# Patient Record
Sex: MALE | Race: WHITE | Employment: OTHER
[De-identification: names, ages, dates, MRNs, and addresses within clinical notes are randomized per-mention and may not be internally consistent; named-entity substitution may affect disease eponyms.]

---

## 2020-02-14 ENCOUNTER — TRANSCRIBE ORDERS (OUTPATIENT)
Dept: OTHER | Age: 40
End: 2020-02-14

## 2020-02-14 DIAGNOSIS — M51.16 INTERVERTEBRAL DISC DISORDER WITH RADICULOPATHY OF LUMBAR REGION: Primary | ICD-10-CM

## 2020-02-17 ENCOUNTER — PRE VISIT (OUTPATIENT)
Dept: ORTHOPEDICS | Facility: CLINIC | Age: 40
End: 2020-02-17

## 2020-02-17 NOTE — TELEPHONE ENCOUNTER
INTAKE QUESTIONS FOR SPINE AND NECK                                                                     REASON FOR VISIT: Intervertebral disc disorder with radiculopathy of lumbar region  Patient with back pain and radicular pain-MRI shows ruptured L4 disc w/nerve impingement     APPOINTMENT DATE: 2/25/20   HAVE YOU HAD PREVIOUS SURGERIES ON YOUR NECK OR SPINE: No  WHERE?     WHEN?    HAVE YOU HAD RELATED IMAGING?   (BONE SCANS, XRAYS, CAT SCANS, MRIS) Yes, MRI    WHERE? [facility]    WHEN? Unknown   HAVE YOU HAD INJECTIONS TO THE SPINE? Unknown    WHERE?     WHEN?    HAVE YOU HAD RELATED PHYSICAL THERAPY IN THE LAST YEAR? Uknown    WHERE?    DO YOU HAVE ANY RELATED IMPLANTS OR HARDWARE? No   DO YOU HAVE ANY PATHOLOGY REPORTS RELATED TO YOUR SPINE OR NECK? No     CSS NOTES STATUS DETAILS   OFFICE NOTE from referring provider In process    OFFICE NOTE from other specialist In process    PHYSICAL THERAPY (WITHIN LAST YEAR) In process    DISCHARGE SUMMARY from hospital In process    DISCHARGE REPORT from the ER In process    OPERATIVE REPORT In process    MEDICATION LIST In process    LABS/CBC/DIFF In process    CULTURES In process    IMPLANT RECORD/STICKER In process    IMAGING     INJECTIONS DONE IN RADIOLOGY In process    MRI In process    CT SCAN In process    XRAYS (IMAGES & REPORTS) In process    TUMOR     PATHOLOGY  Slides & report In process

## 2020-02-24 DIAGNOSIS — M54.50 LUMBAR PAIN: Primary | ICD-10-CM

## 2020-02-25 ENCOUNTER — TRANSFERRED RECORDS (OUTPATIENT)
Dept: HEALTH INFORMATION MANAGEMENT | Facility: CLINIC | Age: 40
End: 2020-02-25

## 2020-02-25 ENCOUNTER — ANCILLARY PROCEDURE (OUTPATIENT)
Dept: GENERAL RADIOLOGY | Facility: CLINIC | Age: 40
End: 2020-02-25
Attending: ORTHOPAEDIC SURGERY
Payer: COMMERCIAL

## 2020-02-25 ENCOUNTER — OFFICE VISIT (OUTPATIENT)
Dept: ORTHOPEDICS | Facility: CLINIC | Age: 40
End: 2020-02-25
Payer: COMMERCIAL

## 2020-02-25 ENCOUNTER — TELEPHONE (OUTPATIENT)
Dept: ORTHOPEDICS | Facility: CLINIC | Age: 40
End: 2020-02-25

## 2020-02-25 VITALS — HEIGHT: 68 IN | BODY MASS INDEX: 24.25 KG/M2 | WEIGHT: 160 LBS

## 2020-02-25 DIAGNOSIS — M51.16 LUMBAR DISC HERNIATION WITH RADICULOPATHY: ICD-10-CM

## 2020-02-25 DIAGNOSIS — M54.50 LUMBAR PAIN: Primary | ICD-10-CM

## 2020-02-25 RX ORDER — HYDROCODONE BITARTRATE AND ACETAMINOPHEN 5; 325 MG/1; MG/1
1 TABLET ORAL 3 TIMES DAILY
Status: ON HOLD | COMMUNITY
End: 2020-03-09

## 2020-02-25 RX ORDER — PREGABALIN 100 MG/1
100 CAPSULE ORAL 3 TIMES DAILY
COMMUNITY
End: 2020-06-23

## 2020-02-25 ASSESSMENT — ENCOUNTER SYMPTOMS
MUSCLE CRAMPS: 1
MYALGIAS: 1
DEPRESSION: 0
BACK PAIN: 1
JOINT SWELLING: 1
STIFFNESS: 1
DECREASED CONCENTRATION: 0
PANIC: 1
MUSCLE WEAKNESS: 1
ARTHRALGIAS: 1
NERVOUS/ANXIOUS: 1
INSOMNIA: 1
NECK PAIN: 0

## 2020-02-25 ASSESSMENT — MIFFLIN-ST. JEOR: SCORE: 1605.26

## 2020-02-25 NOTE — NURSING NOTE
"Reason For Visit:   Chief Complaint   Patient presents with     Consult     intervertebral disc disorder and lumbar radiculopathy        Primary MD: No Ref-Primary, Physician  Ref. MD: cortes   Date of surgery: none   Type of surgery: none .  Smoker: Yes  Request smoking cessation information: nO    Ht 1.727 m (5' 8\")   Wt 72.6 kg (160 lb)   BMI 24.33 kg/m           Oswestry (ANJANA) Questionnaire    OSWESTRY DISABILITY INDEX 2/25/2020   Count 10   Sum 30   Oswestry Score (%) 60            Neck Disability Index (NDI) Questionnaire    No flowsheet data found.                Promis 10 Assessment    No flowsheet data found.             Arron Chopra, ATC  "

## 2020-02-25 NOTE — LETTER
2/25/2020       RE: Ortega Burroughs  [address] [place] [address]     Dear Colleague,    Thank you for referring your patient, Ortega Burroughs, to the [facility] at [facility]. Please see a copy of my visit note below.    Spine Surgery Consultation    REFERRING PHYSICIAN: Donis oLrd MD  PRIMARY CARE PHYSICIAN: No Ref-Primary, Physician           Chief Complaint:   Back pain with right leg radiculopathy.  History of Present Illness:  Symptom Profile Including: location of symptoms, onset, severity, exacerbating/alleviating factors, previous treatments:        Ortega Burroughs is a 39 year old male who presents today with a long history of low back pain for many years.  This is been worsening with right-sided radiculopathy for the last several months.  [facility]  Back pain had led to treatment over the years and he stated he had originally [facility] [place] [facility] he moved here to [place].  He notes that spine surgeries are not done at the [facility] here in [place], and is in for evaluation of significant low back pain with radiation to right-sided gluteals.    Spasm in these muscles has gone on for 3 weeks at this time.  He also has radiating pain to posterior right thigh.  Not to his knee or below.  He has somewhat of an ache in the gluteals on the left side though much less severe.  He states that standing is the worst and that he has no position of relief.  He has been ambulating with a cane to take weight off the right side.  [facility]  After evaluation he was given hydrocodone and started on a prednisone treatment at 60 mg/day.  This was refilled on Friday.  He states that it has not been helpful.  He is not taking muscle relaxer.  He has not had physical therapy but does his own home exercise program.  He has not had any injections for this problem.  He  "has utilized a cane for quite a while for severe left knee pain.  He states that hernia surgery resulted in a radiculopathy around his right-sided inguinal region and has been treated with pregabalin for several years at 100 mg/day dosage.         Past Medical History:   No past medical history on file.         Past Surgical History:   No past surgical history on file.         Social History:     Social History     Tobacco Use     Smoking status: Current Every Day Smoker     Packs/day: 0.50     Years: 25.00     Pack years: 12.50     Smokeless tobacco: Current User     Types: Chew   Substance Use Topics     Alcohol use: Not on file            Family History:   No family history on file.         Allergies:   No Known Allergies         Medications:     Current Outpatient Medications   Medication     HYDROcodone-acetaminophen (NORCO) 5-325 MG tablet     pregabalin (LYRICA) 100 MG capsule     No current facility-administered medications for this visit.              Review of Systems:     A 10 point ROS was performed and reviewed. Specific responses to these questions are noted at the end of the document.         Physical Exam:   Vitals: Ht 1.727 m (5' 8\")   Wt 72.6 kg (160 lb)   BMI 24.33 kg/m     Constitutional: awake, alert, cooperative, no apparent distress, appears stated age.    Eyes: The sclera are white.  Ears, Nose, Throat: The trachea is midline.  Psychiatric: The patient has a normal affect.  Respiratory: breathing non-labored  Cardiovascular: The extremities are warm and perfused.  Skin: no obvious rashes or lesions.  Musculoskeletal, Neurologic, and Spine:    Lumbar Spine:    Appearance - No gross stepoffs or deformities    Motor -     L2-3: Hip flexion 5/5 R and 5/5 L strength          L3/4:  Knee extension R 5/5 and L 5/5 strength         L4/5:  Foot dorsiflexion R 5/5 L 4/5 and       EHL dorsiflexion R 5/5 L 4/5 strength         S1:  Plantarflexion/Peroneal Muscles  R 5/5 and L 5/5 strength    Sensation: " intact to light touch L3-S1 distribution BLE  ROM: Patient can forward flex to bring fingertips to knees.  He can extend about 20 degrees.  He can lean 10 degrees bilaterally.  But has significant pain leaning to the left in his low back.  He can rotate about 30 degrees bilaterally.  Facet loading: Negative  Quadratus lumborum tenderness: Negative  Tenderness: To palpation at right-sided lumbosacral junction.  Tenderness at right deep gluteals.  Nontender at SI joints or trochanters bilaterally.    Neurologic:      REFLEXES Left Right                  Patella 2+ 2+   Ankle jerk trace mute        Clonus 0 beats 0 beats     Hip Exam:   Gluteal pain at extremes of hip log roll, no inguinal pain. No tenderness over the greater trochanters.    Alignment:  Patient stands with a neutral standing sagittal balance.         Imaging:   We ordered and independently reviewed new AP and lateral radiographs, with flexion and extension views at this clinic visit. The results were discussed with the patient.  Findings include:  5 lumbar vertebral bodies with evidence of anterolisthesis of L3 on L4.  Mild multilevel degenerative changes with traction spurring noted.  Notable sclerosis of facet joints and lower lumbar region.  No evidence of motion on flexion-extension views  MRI dated 2/9/2020 demonstrates right paracentral disc herniation impinging right L5 nerve.  Facet arthropathy at L4-5 and L5-S1.           Assessment and Plan:   Assessment:  39 year old male with low back pain and right-sided radiculopathy.  Physical exam is not completely concordant with imaging findings with gluteal and upper thigh pain without notable radiculopathy below the knee.  We would like to clarify these issues by scheduling the patient for a right-sided transforaminal epidural steroid injection at L5-S1.  This is for diagnostic purposes and we had a long discussion with the patient regarding keeping track of his pain with a pain diary.  We would  like to schedule the injection for this week.  We discussed the benefits of physical therapy with the patient.  He states he has had significant physical therapy in the past and does not feel that it is been helpful.  He is not interested in pursuing this treatment option at this time.     Plan:  1. Right-sided transforaminal epidural steroid injection at L5-S1.  2. Follow-up in clinic next week for evaluation.    Staff statement:  The patient is predominantly reporting right buttock and thigh pain.  He does have a right paracentral disc herniation at L5-S1.  His pain does not really extend below the knee.  He does have a mildly positive straight leg raise on my exam.  He does have some weakness with single leg heel rise on the right side.  I told him that potentially imaging is responsible for his symptoms but to help us better differentiate I would like to order an L5-S1 transforaminal epidural injection.  I would like him to keep a pain diary.  If he has significant relief with this he may be a candidate for a microdiscectomy.  I gave him some information to read about the surgery and we can discuss more at the next visit whether or not this would be something he is interested in.    Thank you for allowing Dr Waldrop and myself participate in the care of this patient.  Respectfully,  Jeff Murray PA-C    Respectfully,  Dmitri Waldrop MD  Spine Surgery  [facility]      Attending MD (Dr. Dmitri Waldrop) :  I reviewed and verified the history and physical exam of the patient and discussed the patient's management with the other clinical providers involved in this patient's care including any involved residents or physicians assistants. I reviewed the above note and agree with the documented findings and plan of care, which were communicated to the patient.      Dmitri Waldrop MD      Answers for HPI/ROS submitted by the patient on 2/25/2020   General Symptoms: No  Skin Symptoms:  No  HENT Symptoms: No  EYE SYMPTOMS: No  HEART SYMPTOMS: No  LUNG SYMPTOMS: No  INTESTINAL SYMPTOMS: No  URINARY SYMPTOMS: No  REPRODUCTIVE SYMPTOMS: No  SKELETAL SYMPTOMS: Yes  BLOOD SYMPTOMS: No  NERVOUS SYSTEM SYMPTOMS: No  MENTAL HEALTH SYMPTOMS: Yes  Back pain: Yes  Muscle aches: Yes  Neck pain: No  Swollen joints: Yes  Joint pain: Yes  Bone pain: No  Muscle cramps: Yes  Muscle weakness: Yes  Joint stiffness: Yes  Bone fracture: No  Nervous or Anxious: Yes  Depression: No  Trouble sleeping: Yes  Trouble thinking or concentrating: No  Mood changes: Yes  Panic attacks: Yes

## 2020-02-25 NOTE — TELEPHONE ENCOUNTER
Returned call to [employer] and let them know that they will need to have the [facility] to the [facility] and [facility] process/send us the paperwork to complete.

## 2020-02-25 NOTE — PROGRESS NOTES
Spine Surgery Consultation    REFERRING PHYSICIAN: Donis Lord MD  PRIMARY CARE PHYSICIAN: No Ref-Primary, Physician           Chief Complaint:   Back pain with right leg radiculopathy.  History of Present Illness:  Symptom Profile Including: location of symptoms, onset, severity, exacerbating/alleviating factors, previous treatments:        Ortega Burroughs is a 39 year old male who presents today with a long history of low back pain for many years.  This is been worsening with right-sided radiculopathy for the last several months.  [facility]  Back pain had led to treatment over the years and he stated he had originally had [facility] [place] before he moved here to [place].  He notes that spine surgeries are not done at the [facility] here in [place], and is in for evaluation of significant low back pain with radiation to right-sided gluteals.    Spasm in these muscles has gone on for 3 weeks at this time.  He also has radiating pain to posterior right thigh.  Not to his knee or below.  He has somewhat of an ache in the gluteals on the left side though much less severe.  He states that standing is the worst and that he has no position of relief.  He has been ambulating with a cane to take weight off the right side.  [facility]  After evaluation he was given hydrocodone and started on a prednisone treatment at 60 mg/day.  This was refilled on Friday.  He states that it has not been helpful.  He is not taking muscle relaxer.  He has not had physical therapy but does his own home exercise program.  He has not had any injections for this problem.  He has utilized a cane for quite a while for severe left knee pain.  He states that hernia surgery resulted in a radiculopathy around his right-sided inguinal region and has been treated with pregabalin for several years at 100 mg/day dosage.         Past Medical History:   No past  "medical history on file.         Past Surgical History:   No past surgical history on file.         Social History:     Social History     Tobacco Use     Smoking status: Current Every Day Smoker     Packs/day: 0.50     Years: 25.00     Pack years: 12.50     Smokeless tobacco: Current User     Types: Chew   Substance Use Topics     Alcohol use: Not on file            Family History:   No family history on file.         Allergies:   No Known Allergies         Medications:     Current Outpatient Medications   Medication     HYDROcodone-acetaminophen (NORCO) 5-325 MG tablet     pregabalin (LYRICA) 100 MG capsule     No current facility-administered medications for this visit.              Review of Systems:     A 10 point ROS was performed and reviewed. Specific responses to these questions are noted at the end of the document.         Physical Exam:   Vitals: Ht 1.727 m (5' 8\")   Wt 72.6 kg (160 lb)   BMI 24.33 kg/m    Constitutional: awake, alert, cooperative, no apparent distress, appears stated age.    Eyes: The sclera are white.  Ears, Nose, Throat: The trachea is midline.  Psychiatric: The patient has a normal affect.  Respiratory: breathing non-labored  Cardiovascular: The extremities are warm and perfused.  Skin: no obvious rashes or lesions.  Musculoskeletal, Neurologic, and Spine:    Lumbar Spine:    Appearance - No gross stepoffs or deformities    Motor -     L2-3: Hip flexion 5/5 R and 5/5 L strength          L3/4:  Knee extension R 5/5 and L 5/5 strength         L4/5:  Foot dorsiflexion R 5/5 L 4/5 and       EHL dorsiflexion R 5/5 L 4/5 strength         S1:  Plantarflexion/Peroneal Muscles  R 5/5 and L 5/5 strength    Sensation: intact to light touch L3-S1 distribution BLE  ROM: Patient can forward flex to bring fingertips to knees.  He can extend about 20 degrees.  He can lean 10 degrees bilaterally.  But has significant pain leaning to the left in his low back.  He can rotate about 30 degrees " bilaterally.  Facet loading: Negative  Quadratus lumborum tenderness: Negative  Tenderness: To palpation at right-sided lumbosacral junction.  Tenderness at right deep gluteals.  Nontender at SI joints or trochanters bilaterally.    Neurologic:      REFLEXES Left Right                  Patella 2+ 2+   Ankle jerk trace mute        Clonus 0 beats 0 beats     Hip Exam:   Gluteal pain at extremes of hip log roll, no inguinal pain. No tenderness over the greater trochanters.    Alignment:  Patient stands with a neutral standing sagittal balance.         Imaging:   We ordered and independently reviewed new AP and lateral radiographs, with flexion and extension views at this clinic visit. The results were discussed with the patient.  Findings include:  5 lumbar vertebral bodies with evidence of anterolisthesis of L3 on L4.  Mild multilevel degenerative changes with traction spurring noted.  Notable sclerosis of facet joints and lower lumbar region.  No evidence of motion on flexion-extension views  MRI dated 2/9/2020 demonstrates right paracentral disc herniation impinging right L5 nerve.  Facet arthropathy at L4-5 and L5-S1.           Assessment and Plan:   Assessment:  39 year old male with low back pain and right-sided radiculopathy.  Physical exam is not completely concordant with imaging findings with gluteal and upper thigh pain without notable radiculopathy below the knee.  We would like to clarify these issues by scheduling the patient for a right-sided transforaminal epidural steroid injection at L5-S1.  This is for diagnostic purposes and we had a long discussion with the patient regarding keeping track of his pain with a pain diary.  We would like to schedule the injection for this week.  We discussed the benefits of physical therapy with the patient.  He states he has had significant physical therapy in the past and does not feel that it is been helpful.  He is not interested in pursuing this treatment option  at this time.     Plan:  1. Right-sided transforaminal epidural steroid injection at L5-S1.  2. Follow-up in clinic next week for evaluation.    Staff statement:  The patient is predominantly reporting right buttock and thigh pain.  He does have a right paracentral disc herniation at L5-S1.  His pain does not really extend below the knee.  He does have a mildly positive straight leg raise on my exam.  He does have some weakness with single leg heel rise on the right side.  I told him that potentially imaging is responsible for his symptoms but to help us better differentiate I would like to order an L5-S1 transforaminal epidural injection.  I would like him to keep a pain diary.  If he has significant relief with this he may be a candidate for a microdiscectomy.  I gave him some information to read about the surgery and we can discuss more at the next visit whether or not this would be something he is interested in.    Thank you for allowing Dr Waldrop and myself participate in the care of this patient.  Respectfully,  Jeff Murray PA-C    Respectfully,  Dmitri Waldrop MD  Spine Surgery  [facility]      Attending MD (Dr. Dmitri Waldrop) :  I reviewed and verified the history and physical exam of the patient and discussed the patient's management with the other clinical providers involved in this patient's care including any involved residents or physicians assistants. I reviewed the above note and agree with the documented findings and plan of care, which were communicated to the patient.      Dmitri Waldrop MD      Answers for HPI/ROS submitted by the patient on 2/25/2020   General Symptoms: No  Skin Symptoms: No  HENT Symptoms: No  EYE SYMPTOMS: No  HEART SYMPTOMS: No  LUNG SYMPTOMS: No  INTESTINAL SYMPTOMS: No  URINARY SYMPTOMS: No  REPRODUCTIVE SYMPTOMS: No  SKELETAL SYMPTOMS: Yes  BLOOD SYMPTOMS: No  NERVOUS SYSTEM SYMPTOMS: No  MENTAL HEALTH SYMPTOMS: Yes  Back pain:  Yes  Muscle aches: Yes  Neck pain: No  Swollen joints: Yes  Joint pain: Yes  Bone pain: No  Muscle cramps: Yes  Muscle weakness: Yes  Joint stiffness: Yes  Bone fracture: No  Nervous or Anxious: Yes  Depression: No  Trouble sleeping: Yes  Trouble thinking or concentrating: No  Mood changes: Yes  Panic attacks: Yes

## 2020-03-03 ENCOUNTER — OFFICE VISIT (OUTPATIENT)
Dept: ORTHOPEDICS | Facility: CLINIC | Age: 40
End: 2020-03-03
Payer: COMMERCIAL

## 2020-03-03 DIAGNOSIS — M54.16 LUMBAR RADICULOPATHY: ICD-10-CM

## 2020-03-03 DIAGNOSIS — M54.50 LUMBAR PAIN: Primary | ICD-10-CM

## 2020-03-03 ASSESSMENT — ENCOUNTER SYMPTOMS
STIFFNESS: 1
DEPRESSION: 0
ARTHRALGIAS: 1
INSOMNIA: 1
NECK PAIN: 0
MYALGIAS: 1
MUSCLE CRAMPS: 1
JOINT SWELLING: 1
MUSCLE WEAKNESS: 1
NERVOUS/ANXIOUS: 1
BACK PAIN: 1
PANIC: 1
DECREASED CONCENTRATION: 0

## 2020-03-03 NOTE — NURSING NOTE
Reason For Visit:   Chief Complaint   Patient presents with     RECHECK     injection cancelled due to insurance. going to check down strairs        There were no vitals taken for this visit.         Arron Chopra, ATC

## 2020-03-03 NOTE — NURSING NOTE
Teaching Flowsheet   Relevant Diagnosis: L5 S1 Microdiscectomy   Teaching Topic: Pre op teaching     Person(s) involved in teaching:   Patient     Motivation Level:  Asks Questions: Yes  Eager to Learn: Yes  Cooperative: Yes  Receptive (willing/able to accept information): Yes  Any cultural factors/[place] beliefs that may influence understanding or compliance? No       Patient demonstrates understanding of the following:  Reason for the appointment, diagnosis and treatment plan: Yes  Knowledge of proper use of medications and conditions for which they are ordered (with special attention to potential side effects or drug interactions): Yes  Which situations necessitate calling provider and whom to contact: Yes       Teaching Concerns Addressed: RN discussed all aspects of pre op surgery including location, pre op shower, NPO status and post surgery pain mgmt. Carol scheduled PAC and surgery dates. Patient verbalized understanding. No further questions.       Proper use and care of med (medical equip, care aids, etc.): Yes  Nutritional needs and diet plan: Yes  Pain management techniques: Yes  Wound Care: Yes  How and/when to access community resources: Yes     Instructional Materials Used/Given: Pre op packet and antibacterial soap.     Time spent with patient: 15 minutes.

## 2020-03-03 NOTE — LETTER
3/3/2020       RE: Ortega Burroughs  [address] [place] [address]     Dear Colleague,    Thank you for referring your patient, Ortega Burroughs, to the [facility] ORTHOP[facility] at [facility]. Please see a copy of my visit note below.    Spine Surgery Return Clinic Visit      Chief Complaint:   RECHECK (injection cancelled due to insurance. going to check down strairs )      Interval HPI:  Symptom Profile Including: location of symptoms, onset, severity, exacerbating/alleviating factors, previous treatments:        Ortega Burroughs is a 39 year old male who returns again today in follow-up of right L5 and S1 distribution radiculopathy with a known L5-S1 disc herniation.  His symptoms predominantly consist of buttock and thigh pain.  Occasionally he will feel some tingling down in the calf and leg but is really the buttock and thigh.  He has been monitored through the [facility] system for quite some time and has been taking Lyrica and hydrocodone for symptomatic relief.  He is done therapy with them intermittently in the past.    I last saw him a couple of weeks ago and at that time I recommended he try an L5-S1 transforaminal epidural injection.  This was denied by the [facility] system.  He returns to me again today wondering if there is anything else we can do for this pain and he feels quite disabled by it.  He has a significant difficulty straightening the leg and severe difficulty walking.  He is walking with a cane because of the severity of the symptoms.            Past Medical History:   No past medical history on file.         Past Surgical History:   No past surgical history on file.         Social History:     Social History     Tobacco Use     Smoking status: Current Every Day Smoker     Packs/day: 0.50     Years: 25.00     Pack years: 12.50     Smokeless tobacco: Current User     Types: Chew   Substance Use Topics     Alcohol  use: Not on file            Family History:   No family history on file.         Allergies:   No Known Allergies         Medications:     Current Outpatient Medications   Medication     HYDROcodone-acetaminophen (NORCO) 5-325 MG tablet     pregabalin (LYRICA) 100 MG capsule     No current facility-administered medications for this visit.              Review of Systems:   A focused musculoskeletal and neurologic ROS was performed with pertinent positives and negatives noted in the HPI.  Additional systems were also reviewed and are documented at the bottom of the note.         Physical Exam:   Vitals: There were no vitals taken for this visit.  Musculoskeletal, Neurologic, and Spine:         Lumbar Spine:    Appearance - No gross stepoffs or deformities    Motor -     L2-3: Hip flexion 5/5 R and 5/5 L strength          L3/4:  Knee extension R 5/5 and L 5/5 strength         L4/5:  Foot dorsiflexion R 5/5 L 5/5 and       EHL dorsiflexion R 4/5 L 4/5 strength         S1:  Plantarflexion/Peroneal Muscles  R 5/5 and L 5/5 strength    Sensation: intact to light touch L3-S1 distribution BLE, diminished right buttock and thigh,    Positive right strait leg raise    Hip Exam:  No pain with hip log roll and no tenderness over the greater trochanters.    Alignment:  Patient stands with a neutral standing sagittal balance.         Imaging:   We ordered and independently reviewed new radiographs at this clinic visit. The results were discussed with the patient. Findings include:     I again reviewed his previous lumbar MRI and x-rays.  Right paracentral disc herniation L5-S1 with severe impingement of the traversing S1 nerve root     Assessment and Plan:     39 year old male with right L5-S1 paracentral disc herniation and S1 distribution radiculopathy.    He has been taking gabapentin and trying therapy.  At our last visit we discussed the option of an injection.  However this was denied by his insurance.  Given this I do think  it is reasonable for him to consider a microdiscectomy.    Risks of this surgery include risk of infection, risk of dural tear resulting in CSF leak which might result in headaches, or possible need for lumbar drain, or possible revision surgery in the setting of a persistent leak. Risk of seroma or hematoma requiring revision surgery. Possible nerve root injury resulting in numbness weakness or paralysis into the leg. Possible radiculitis which could result in similar symptoms or could result in significant neurogenic type pain into the leg. Risk of incomplete decompression which might require revision surgery in the future.  Risk of pars fracture or postoperative instability requiring conversion to a fusion in the future. Risk of adjacent segment problems requiring surgery in the future. Risk of incomplete relief of symptoms possibly requiring revision surgery in the future. There is a risk of blood clots in the legs or the lungs.  Furthermore, although rare, there are risks of major vessel or major organ injury from the surgery, and risks of the anesthetic including stroke heart attack and death.    I explained the 6-week recovery, the need for activity avoidance and the risk of recurrent disc herniation if he does too much too soon.  He understands and he would like to proceed.  He feels severely disabled and I do think at this point that he is maximized the nonoperative care options available to him.     Respectfully,  Dmitri Waldrop MD  Spine Surgery  [facility]

## 2020-03-03 NOTE — PROGRESS NOTES
Spine Surgery Return Clinic Visit      Chief Complaint:   RECHECK (injection cancelled due to insurance. going to check down strairs )      Interval HPI:  Symptom Profile Including: location of symptoms, onset, severity, exacerbating/alleviating factors, previous treatments:        Ortega Burroughs is a 39 year old male who returns again today in follow-up of right L5 and S1 distribution radiculopathy with a known L5-S1 disc herniation.  His symptoms predominantly consist of buttock and thigh pain.  Occasionally he will feel some tingling down in the calf and leg but is really the buttock and thigh.  He has been monitored through the [facility] system for quite some time and has been taking Lyrica and hydrocodone for symptomatic relief.  He is done therapy with them intermittently in the past.    I last saw him a couple of weeks ago and at that time I recommended he try an L5-S1 transforaminal epidural injection.  This was denied by the [facility] system.  He returns to me again today wondering if there is anything else we can do for this pain and he feels quite disabled by it.  He has a significant difficulty straightening the leg and severe difficulty walking.  He is walking with a cane because of the severity of the symptoms.            Past Medical History:   No past medical history on file.         Past Surgical History:   No past surgical history on file.         Social History:     Social History     Tobacco Use     Smoking status: Current Every Day Smoker     Packs/day: 0.50     Years: 25.00     Pack years: 12.50     Smokeless tobacco: Current User     Types: Chew   Substance Use Topics     Alcohol use: Not on file            Family History:   No family history on file.         Allergies:   No Known Allergies         Medications:     Current Outpatient Medications   Medication     HYDROcodone-acetaminophen (NORCO) 5-325 MG tablet     pregabalin (LYRICA) 100 MG capsule     No  current facility-administered medications for this visit.              Review of Systems:   A focused musculoskeletal and neurologic ROS was performed with pertinent positives and negatives noted in the HPI.  Additional systems were also reviewed and are documented at the bottom of the note.         Physical Exam:   Vitals: There were no vitals taken for this visit.  Musculoskeletal, Neurologic, and Spine:         Lumbar Spine:    Appearance - No gross stepoffs or deformities    Motor -     L2-3: Hip flexion 5/5 R and 5/5 L strength          L3/4:  Knee extension R 5/5 and L 5/5 strength         L4/5:  Foot dorsiflexion R 5/5 L 5/5 and       EHL dorsiflexion R 4/5 L 4/5 strength         S1:  Plantarflexion/Peroneal Muscles  R 5/5 and L 5/5 strength    Sensation: intact to light touch L3-S1 distribution BLE, diminished right buttock and thigh,    Positive right strait leg raise    Hip Exam:  No pain with hip log roll and no tenderness over the greater trochanters.    Alignment:  Patient stands with a neutral standing sagittal balance.           Imaging:   We ordered and independently reviewed new radiographs at this clinic visit. The results were discussed with the patient. Findings include:     I again reviewed his previous lumbar MRI and x-rays.  Right paracentral disc herniation L5-S1 with severe impingement of the traversing S1 nerve root       Assessment and Plan:     39 year old male with right L5-S1 paracentral disc herniation and S1 distribution radiculopathy.    He has been taking gabapentin and trying therapy.  At our last visit we discussed the option of an injection.  However this was denied by his insurance.  Given this I do think it is reasonable for him to consider a microdiscectomy.    Risks of this surgery include risk of infection, risk of dural tear resulting in CSF leak which might result in headaches, or possible need for lumbar drain, or possible revision surgery in the setting of a persistent  leak. Risk of seroma or hematoma requiring revision surgery. Possible nerve root injury resulting in numbness weakness or paralysis into the leg. Possible radiculitis which could result in similar symptoms or could result in significant neurogenic type pain into the leg. Risk of incomplete decompression which might require revision surgery in the future.  Risk of pars fracture or postoperative instability requiring conversion to a fusion in the future. Risk of adjacent segment problems requiring surgery in the future. Risk of incomplete relief of symptoms possibly requiring revision surgery in the future. There is a risk of blood clots in the legs or the lungs.  Furthermore, although rare, there are risks of major vessel or major organ injury from the surgery, and risks of the anesthetic including stroke heart attack and death.    I explained the 6-week recovery, the need for activity avoidance and the risk of recurrent disc herniation if he does too much too soon.  He understands and he would like to proceed.  He feels severely disabled and I do think at this point that he is maximized the nonoperative care options available to him.           Respectfully,  Dmitri Waldrop MD  Spine Surgery  [facility]    Answers for HPI/ROS submitted by the patient on 3/3/2020   General Symptoms: No  Skin Symptoms: No  HENT Symptoms: No  EYE SYMPTOMS: No  HEART SYMPTOMS: No  LUNG SYMPTOMS: No  INTESTINAL SYMPTOMS: No  URINARY SYMPTOMS: No  REPRODUCTIVE SYMPTOMS: No  SKELETAL SYMPTOMS: Yes  BLOOD SYMPTOMS: No  NERVOUS SYSTEM SYMPTOMS: No  MENTAL HEALTH SYMPTOMS: Yes  Back pain: Yes  Muscle aches: Yes  Neck pain: No  Swollen joints: Yes  Joint pain: Yes  Bone pain: No  Muscle cramps: Yes  Muscle weakness: Yes  Joint stiffness: Yes  Bone fracture: No  Nervous or Anxious: Yes  Depression: No  Trouble sleeping: Yes  Trouble thinking or concentrating: No  Mood changes: No  Panic attacks: Yes

## 2020-03-03 NOTE — TELEPHONE ENCOUNTER
FUTURE VISIT INFORMATION      SURGERY INFORMATION:    Date: 3/9/20    Location: [facility] OR    Surgeon:  Dmitri Waldrop MD    Anesthesia Type: general    Procedure: Right Lumbar 5 to Sacral 1 microdiscectomy    Consult: ov 3/2 Benjie    RECORDS REQUESTED FROM:       [facility]- [facility] recs[facility]tests

## 2020-03-06 ENCOUNTER — ANESTHESIA EVENT (OUTPATIENT)
Dept: SURGERY | Facility: CLINIC | Age: 40
End: 2020-03-06

## 2020-03-06 ENCOUNTER — ANESTHESIA EVENT (OUTPATIENT)
Dept: SURGERY | Facility: CLINIC | Age: 40
End: 2020-03-06
Payer: COMMERCIAL

## 2020-03-06 ENCOUNTER — PRE VISIT (OUTPATIENT)
Dept: SURGERY | Facility: CLINIC | Age: 40
End: 2020-03-06

## 2020-03-06 ENCOUNTER — OFFICE VISIT (OUTPATIENT)
Dept: SURGERY | Facility: CLINIC | Age: 40
End: 2020-03-06
Payer: COMMERCIAL

## 2020-03-06 VITALS
SYSTOLIC BLOOD PRESSURE: 115 MMHG | HEART RATE: 108 BPM | HEIGHT: 68 IN | RESPIRATION RATE: 12 BRPM | OXYGEN SATURATION: 95 % | WEIGHT: 159 LBS | TEMPERATURE: 98.1 F | DIASTOLIC BLOOD PRESSURE: 66 MMHG | BODY MASS INDEX: 24.1 KG/M2

## 2020-03-06 DIAGNOSIS — Z01.818 PREOP EXAMINATION: Primary | ICD-10-CM

## 2020-03-06 DIAGNOSIS — Z01.818 PREOP EXAMINATION: ICD-10-CM

## 2020-03-06 LAB
ANION GAP SERPL CALCULATED.3IONS-SCNC: 10 MMOL/L (ref 3–14)
BUN SERPL-MCNC: 21 MG/DL (ref 7–30)
CALCIUM SERPL-MCNC: 9.8 MG/DL (ref 8.5–10.1)
CHLORIDE SERPL-SCNC: 110 MMOL/L (ref 94–109)
CO2 SERPL-SCNC: 21 MMOL/L (ref 20–32)
CREAT SERPL-MCNC: 1.26 MG/DL (ref 0.66–1.25)
ERYTHROCYTE [DISTWIDTH] IN BLOOD BY AUTOMATED COUNT: 12.4 % (ref 10–15)
GFR SERPL CREATININE-BSD FRML MDRD: 70 ML/MIN/{1.73_M2}
GLUCOSE SERPL-MCNC: 88 MG/DL (ref 70–99)
HCT VFR BLD AUTO: 48.7 % (ref 40–53)
HGB BLD-MCNC: 16.2 G/DL (ref 13.3–17.7)
MCH RBC QN AUTO: 30.4 PG (ref 26.5–33)
MCHC RBC AUTO-ENTMCNC: 33.3 G/DL (ref 31.5–36.5)
MCV RBC AUTO: 91 FL (ref 78–100)
PLATELET # BLD AUTO: 224 10E9/L (ref 150–450)
POTASSIUM SERPL-SCNC: 4.6 MMOL/L (ref 3.4–5.3)
RBC # BLD AUTO: 5.33 10E12/L (ref 4.4–5.9)
SODIUM SERPL-SCNC: 141 MMOL/L (ref 133–144)
WBC # BLD AUTO: 13.5 10E9/L (ref 4–11)

## 2020-03-06 RX ORDER — QUETIAPINE FUMARATE 50 MG/1
50 TABLET, FILM COATED ORAL AT BEDTIME
COMMUNITY
End: 2020-06-23

## 2020-03-06 RX ORDER — LIDOCAINE 4 G/G
1 PATCH TOPICAL DAILY
Status: ON HOLD | COMMUNITY
End: 2020-06-30

## 2020-03-06 SDOH — HEALTH STABILITY: MENTAL HEALTH: HOW OFTEN DO YOU HAVE A DRINK CONTAINING ALCOHOL?: MONTHLY OR LESS

## 2020-03-06 ASSESSMENT — MIFFLIN-ST. JEOR: SCORE: 1600.72

## 2020-03-06 ASSESSMENT — PAIN SCALES - GENERAL: PAINLEVEL: EXTREME PAIN (9)

## 2020-03-06 ASSESSMENT — LIFESTYLE VARIABLES: TOBACCO_USE: 1

## 2020-03-06 NOTE — PATIENT INSTRUCTIONS
Preparing for Your Surgery      Name:  Ortega Burroughs   MRN:  [id number]   :  1980   Today's Date:  3/6/2020     Arriving for surgery:  Surgery date:  3/9/20  Arrival time:  09:30 am  Please come to:     [facility] [address]  [address] [place]  [address]    - parking is available in front of [facility] from 5:15AM to 8:00PM. If you prefer, park your car in the Green Lot.    -Proceed to the 3rd floor, check in at the Adult Surgery Waiting Lounge. [phone]    If an escort is needed stop at the Information Desk in the lobby. Inform the information person that you are here for surgery. An escort to the Adult Surgery Waiting Lounge will be provided.        What can I eat or drink?  -  You may have solid food or milk products until 8 hours prior to your surgery.  -  You may have water, apple juice or 7up/Sprite until 2 hours prior to your surgery.    Which medicines can I take?  Hold Aspirin, vitamins and supplements one week prior to surgery.  Hold Ibuprofen for 24 hours and/or Naproxen for 48 hours prior to surgery.   -  Please take these medications the day of surgery:  Tylenol or Norco if needed.    How do I prepare myself?  -  Take two showers: one the night before surgery; and one the morning of surgery.         Use Scrubcare or Hibiclens to wash from neck down, leave soap on your skin for up to one minute.  Do not get soap in your eyes or ears.  You may use your own shampoo and conditioner; no other hair products.   -  Do NOT use lotion, powder, deodorant, or antiperspirant the day of your surgery.  -  Do NOT wear any jewelry.  - Do not bring your own medications to the hospital, except for inhalers and eye drops.  -  Bring your ID and insurance card.      Questions or Concerns:  -If you are scheduled on the [facility] or [facility] and have questions or concerns regarding the day of surgery, please call Preadmission Nursing at [phone].    -If you have health changes between today and your surgery please call your surgeon. For questions after surgery please call your surgeons office.

## 2020-03-06 NOTE — ANESTHESIA PREPROCEDURE EVALUATION
"Anesthesia Pre-Procedure Evaluation    Patient: Ortega Burroughs   MRN:     [id number] Gender:   male   Age:    39 year old :      1980        Preoperative Diagnosis: * No surgery found *        LABS:  CBC: No results found for: WBC, HGB, HCT, PLT  BMP: No results found for: NA, POTASSIUM, CHLORIDE, CO2, BUN, CR, GLC  COAGS: No results found for: PTT, INR, FIBR  POC: No results found for: BGM, HCG, HCGS  OTHER: No results found for: PH, LACT, A1C, MIHIR, PHOS, MAG, ALBUMIN, PROTTOTAL, ALT, AST, GGT, ALKPHOS, BILITOTAL, BILIDIRECT, LIPASE, AMYLASE, ZAHIDA, TSH, T4, T3, CRP, SED     Preop Vitals    BP Readings from Last 3 Encounters:   20 115/66    Pulse Readings from Last 3 Encounters:   20 108      Resp Readings from Last 3 Encounters:   20 12    SpO2 Readings from Last 3 Encounters:   20 95%      Temp Readings from Last 1 Encounters:   20 98.1  F (36.7  C) (Oral)    Ht Readings from Last 1 Encounters:   20 1.727 m (5' 8\")      Wt Readings from Last 1 Encounters:   20 72.1 kg (159 lb)    Estimated body mass index is 24.18 kg/m  as calculated from the following:    Height as of this encounter: 1.727 m (5' 8\").    Weight as of this encounter: 72.1 kg (159 lb).     LDA:        Past Medical History:   Diagnosis Date     Anxiety      Arthritis       Past Surgical History:   Procedure Laterality Date     APPENDECTOMY       CHOLECYSTECTOMY  2009     HERNIA REPAIR       KNEE SURGERY      3 on left knee, 1 on right knee (arthroscopic)      Allergies   Allergen Reactions     Reglan [Metoclopramide] Itching        Anesthesia Evaluation     . Pt has had prior anesthetic.     History of anesthetic complications   - PONV        ROS/MED HX    ENT/Pulmonary:     (+)tobacco use, Current use , . .   (-) sleep apnea and DROIS risk factors   Neurologic:     (+)neuropathy - right leg numbness and pain,     Cardiovascular:     (+) ----. : . . . :. . Previous cardiac testing " date:results:Stress Testdate:2010 results: date: results: date: results:          METS/Exercise Tolerance:  1 - Eating, dressing   Hematologic:  - neg hematologic  ROS       Musculoskeletal: Comment: Lumbar pain        GI/Hepatic:     (+) GERD Asymptomatic on medication,       Renal/Genitourinary:  - ROS Renal section negative       Endo:  - neg endo ROS       Psychiatric:     (+) psychiatric history anxiety      Infectious Disease:  - neg infectious disease ROS       Malignancy:      - no malignancy   Other:    (+) No chance of pregnancy H/O Chronic Pain,H/O chronic opiod use ,                        PHYSICAL EXAM:   Mental Status/Neuro: A/A/O; Age Appropriate   Airway: Facies: Feasible  Mallampati: I  Mouth/Opening: Full  TM distance: > 6 cm  Neck ROM: Full   Respiratory: Auscultation: CTAB     Resp. Rate: Normal     Resp. Effort: Normal      CV: Rhythm: Regular  Rate: Age appropriate  Heart: Normal Sounds  Edema: None   Comments:      Dental: Normal Dentition                JZG FV AN PLAN NO PONV RULE       PAC Discussion and Assessment    ASA Classification: 2  Case is suitable for: [facility]  Anesthetic techniques and relevant risks discussed: GA  Invasive monitoring and risk discussed: No  Types:   Possibility and Risk of blood transfusion discussed: No  NPO instructions given:   Additional anesthetic preparation and risks discussed:   Needs early admission to pre-op area:   Other:     PAC Resident/NP Anesthesia Assessment:  Ortega Burroughs is a 39 year old male scheduled for Right Lumbar 5 to Sacral 1 microdiscectomy on 3/9/2020 by Dr. Waldrop in treatment of lumbar radiculopathy.  PAC referral for risk assessment and optimization for anesthesia with comorbid conditions of anxiety, GERD, smoking:    Pre-operative considerations:  1.  Cardiac:  Functional status- METS 1-2. Patient is limited by his back and right leg pain.  He denies any cardiopulmonary symptoms.   Low risk surgery with 0.4% (RCRI 0) risk of  major adverse cardiac event. Denies chest pain, chest pressure, SOB, GARCÍA, palpitations.  -pt [facility]  I do not have these records.  According to patient it was negative and his symptoms at the time were due to anxiety.      2.  Pulm:  Airway feasible.  DORIS risk: low  -current smoker, half pack per day    3.  GI:  Risk of PONV score = 2.  Pt endorses history of PONV after hernia surgery.  He states he has had success with scopolamine patch. Will place order.  -GERD, asymptomatic on omeprazole. May take DOS.    4.  MSK:  -lumbar radiculopathy, right leg pain.  Procedure as above.   -may take Norco/Lyrica DOS    5.  Psych:  -anxiety, seroquel at bedtime    VTE risk: 0.5%    Patient is optimized and is acceptable candidate for the proposed procedure.      **For further details of assessment, testing, and physical exam please see H and P completed on same date.          Mariam Middleton PA-C, [facility]      Reviewed and Signed by PAC Mid-Level Provider/Resident  Mid-Level Provider/Resident: Mariam Middleton  Date: 3/6/2020  Time:     Attending Anesthesiologist Anesthesia Assessment:        Anesthesiologist:   Date:   Time:   Pass/Fail:   Disposition:     PAC Pharmacist Assessment:        Pharmacist:   Date:   Time:    Mariam Middleton PA-C

## 2020-03-06 NOTE — ANESTHESIA PREPROCEDURE EVALUATION
"Anesthesia Pre-Procedure Evaluation    Patient: Ortega Burroughs   MRN:     [id number] Gender:   male   Age:    39 year old :      1980        Preoperative Diagnosis: Lumbar radiculopathy [M54.16]   Procedure(s):  Right Lumbar 5 to Sacral 1 microdiscectomy     LABS:  CBC: No results found for: WBC, HGB, HCT, PLT  BMP: No results found for: NA, POTASSIUM, CHLORIDE, CO2, BUN, CR, GLC  COAGS: No results found for: PTT, INR, FIBR  POC: No results found for: BGM, HCG, HCGS  OTHER: No results found for: PH, LACT, A1C, MIHIR, PHOS, MAG, ALBUMIN, PROTTOTAL, ALT, AST, GGT, ALKPHOS, BILITOTAL, BILIDIRECT, LIPASE, AMYLASE, ZAHIDA, TSH, T4, T3, CRP, SED     Preop Vitals    BP Readings from Last 3 Encounters:   No data found for BP    Pulse Readings from Last 3 Encounters:   No data found for Pulse      Resp Readings from Last 3 Encounters:   No data found for Resp    SpO2 Readings from Last 3 Encounters:   No data found for SpO2      Temp Readings from Last 1 Encounters:   No data found for Temp    Ht Readings from Last 1 Encounters:   20 1.727 m (5' 8\")      Wt Readings from Last 1 Encounters:   20 72.6 kg (160 lb)    Estimated body mass index is 24.33 kg/m  as calculated from the following:    Height as of 20: 1.727 m (5' 8\").    Weight as of 20: 72.6 kg (160 lb).     LDA:        No past medical history on file.   No past surgical history on file.   No Known Allergies     Anesthesia Evaluation     .             ROS/MED HX    ENT/Pulmonary:     (+)tobacco use, Current use 1-5 cigarettes  packs/day  , . .    Neurologic:     (+)neuropathy - right lower extremity pain,     Cardiovascular:  - neg cardiovascular ROS       METS/Exercise Tolerance:     Hematologic:         Musculoskeletal:         GI/Hepatic:         Renal/Genitourinary:         Endo:         Psychiatric:  - neg psychiatric ROS       Infectious Disease:         Malignancy:         Other:                         PHYSICAL EXAM:   Mental " Status/Neuro: A/A/O   Airway: Facies: Feasible  Mallampati: II  Mouth/Opening: Full  TM distance: > 6 cm  Neck ROM: Full   Respiratory: Auscultation: CTAB     Resp. Rate: Normal     Resp. Effort: Normal      CV: Rhythm: Regular  Rate: Age appropriate  Heart: Normal Sounds  Edema: None   Comments:      Dental: Normal Dentition                Assessment:   ASA SCORE: 3    H&P: History and physical reviewed and following examination; no interval change.   Smoking Status:  Active Smoker   NPO Status: NPO Appropriate     Plan:   Anes. Type:  General   Pre-Medication: None   Induction:  IV (Standard)   Airway: ETT; Oral   Access/Monitoring: PIV   Maintenance: Balanced     Postop Plan:   Postop Pain: Opioids  Postop Sedation/Airway: Not planned  Disposition: Inpatient/Admit     PONV Management:   Adult Risk Factors:, Postop Opioids   Prevention: Ondansetron, Dexamethasone     CONSENT: Direct conversation   Plan and risks discussed with: Patient   Blood Products: Consented (ALL Blood Products)       Comments for Plan/Consent:  GA with ETT  Risks versus benefits discussed. All questions answered                   Yousif Delarosa MD

## 2020-03-06 NOTE — H&P
Pre-Operative H & P     CC:  Preoperative exam to assess for increased cardiopulmonary risk while undergoing surgery and anesthesia.    Date of Encounter: 3/6/2020  Primary Care Physician:  No Ref-Primary, Physician  Associated Diagnosis: lumbar radiculopathy    HPI  Ortega Burroughs is a 39 year old male who presents for  pre-operative H & P in preparation for Right Lumbar 5 to Sacral 1 microdiscectomy with Dr. Waldrop on 3/9/2020 at [facility]. General anesthesia.    This is a 39-year-old male patient with a history of low back pain for many years.  His back pain is worse on the right.  [facility]  [facility] the [facility] because [facility].  He wanted evaluation of his back pain and options for treatment.  He states that his significant low back pain radiates down the posterior right thigh to above the knee.  Standing makes the pain worse and he cannot find a particular position that makes his pain better.  He uses a cane to keep the weight off of his right leg.  Patient is currently taking Lyrica and hydrocodone for his pain.  The Lyrica is taken due to a right-sided inguinal neuropathy that started as a result of a hernia surgery many years ago.  The hydrocodone was just started recently at the emergency department.  He also uses edibles for his pain and anxiety.     History is obtained from the patient.     Past Medical History  Past Medical History:   Diagnosis Date     Anxiety      Arthritis        Past Surgical History  Past Surgical History:   Procedure Laterality Date     APPENDECTOMY       CHOLECYSTECTOMY  2009     HERNIA REPAIR  2005     KNEE SURGERY      3 on left knee, 1 on right knee (arthroscopic)       Hx of Blood transfusions/reactions: denies     Hx of abnormal bleeding or anti-platelet use: denies    Menstrual history: No LMP for male patient.:     Steroid use in the last year: pt was  recently on prednisone but has not taken it for several weeks.    Personal or FH with difficulty with Anesthesia:  denies    Prior to Admission Medications  Current Outpatient Medications   Medication Sig Dispense Refill     HYDROcodone-acetaminophen (NORCO) 5-325 MG tablet Take 1 tablet by mouth 3 times daily        Lidocaine (LIDOCARE) 4 % Patch Place 1 patch onto the skin every 24 hours To prevent lidocaine toxicity, patient should be patch free for 12 hrs daily.       medical cannabis (Patient's own supply) 1 Dose by Other route See Admin Instructions (The purpose of this order is to document that the patient reports taking medical cannabis.  This is not a prescription, and is not used to certify that the patient has a qualifying medical condition.)       omeprazole (PRILOSEC) 20 MG DR capsule Take 20 mg by mouth every morning       pregabalin (LYRICA) 100 MG capsule Take 100 mg by mouth 3 times daily        QUEtiapine (SEROQUEL) 50 MG tablet Take 50 mg by mouth At Bedtime         Allergies  Allergies   Allergen Reactions     Reglan [Metoclopramide] Itching       Social History  Social History     Socioeconomic History     Marital status: Single     Spouse name: Not on file     Number of children: Not on file     Years of education: Not on file     Highest education level: Not on file   Occupational History     Not on file   Social Needs     Financial resource strain: Not on file     Food insecurity:     Worry: Not on file     Inability: Not on file     Transportation needs:     Medical: Not on file     Non-medical: Not on file   Tobacco Use     Smoking status: Current Every Day Smoker     Packs/day: 0.50     Years: 25.00     Pack years: 12.50     Smokeless tobacco: Current User     Types: Chew   Substance and Sexual Activity     Alcohol use: Yes     Frequency: Monthly or less     Drug use: Yes     Types: Marijuana     Comment: edibles for pain and anxiety     Sexual activity: Not on file   Lifestyle      Physical activity:     Days per week: Not on file     Minutes per session: Not on file     Stress: Not on file   Relationships     Social connections:     Talks on phone: Not on file     Gets together: Not on file     Attends Baptism service: Not on file     Active member of club or organization: Not on file     Attends meetings of clubs or organizations: Not on file     Relationship status: Not on file     Intimate partner violence:     Fear of current or ex partner: Not on file     Emotionally abused: Not on file     Physically abused: Not on file     Forced sexual activity: Not on file   Other Topics Concern     Not on file   Social History Narrative     Not on file       Family History  Family History   Problem Relation Age of Onset     Chronic Obstructive Pulmonary Disease Mother      Hypertension Father            Anesthesia Evaluation     . Pt has had prior anesthetic.     History of anesthetic complications   - PONV        ROS/MED HX  The complete review of systems is negative other than noted in the HPI or here.   ENT/Pulmonary:     (+)tobacco use, Current use , . .   (-) sleep apnea and DORIS risk factors   Neurologic:     (+)neuropathy - right leg numbness and pain,     Cardiovascular:     (+) ----. : . . . :. . Previous cardiac testing date:results:Stress Testdate:2010 results: date: results: date: results:          METS/Exercise Tolerance:  1 - Eating, dressing   Hematologic:  - neg hematologic  ROS       Musculoskeletal: Comment: Lumbar pain        GI/Hepatic:     (+) GERD Asymptomatic on medication,       Renal/Genitourinary:  - ROS Renal section negative       Endo:  - neg endo ROS       Psychiatric:     (+) psychiatric history anxiety      Infectious Disease:  - neg infectious disease ROS       Malignancy:      - no malignancy   Other:    (+) No chance of pregnancy H/O Chronic Pain,H/O chronic opiod use ,            PHYSICAL EXAM:   Mental Status/Neuro: A/A/O; Age Appropriate   Airway: Facies:  "Feasible  Mallampati: I  Mouth/Opening: Full  TM distance: > 6 cm  Neck ROM: Full   Respiratory: Auscultation: CTAB     Resp. Rate: Normal     Resp. Effort: Normal      CV: Rhythm: Regular  Rate: Age appropriate  Heart: Normal Sounds  Edema: None   Comments:      Dental: Normal Dentition              Temp: 98.1  F (36.7  C) Temp src: Oral BP: 115/66 Pulse: 108   Resp: 12 SpO2: 95 %         159 lbs 0 oz  5' 8\"[PT REPORT[   Body mass index is 24.18 kg/m .       Physical Exam  Constitutional: Awake, alert, cooperative, no apparent distress, and appears stated age.  Looks uncomfortable.  Eyes: Pupils equal, round and reactive to light, extra ocular muscles intact, sclera clear, conjunctiva normal.  HENT: Normocephalic, oral pharynx with moist mucus membranes, good dentition. No goiter appreciated.   Respiratory: Clear to auscultation bilaterally, no crackles or wheezing.  Cardiovascular: Regular rate and rhythm, normal S1 and S2, and no murmur noted.  Carotids +2, no bruits. No edema. Palpable pulses to radial  DP and PT arteries.   GI: Normal bowel sounds  Lymph/Hematologic: No cervical lymphadenopathy and no supraclavicular lymphadenopathy.  Genitourinary:  deferred  Skin: Warm and dry.  No rashes at anticipated surgical site.   Musculoskeletal: Full ROM of neck. There is no redness, warmth, or swelling of the joints. Gross motor strength is normal.    Neurologic: Awake, alert, oriented to name, place and time. Cranial nerves II-XII are grossly intact. Gait is normal.   Neuropsychiatric: Calm, cooperative. Normal affect.     Labs: (personally reviewed)  Component      Latest Ref Rng & Units 3/6/2020   WBC      4.0 - 11.0 10e9/L 13.5 (H)   RBC Count      4.4 - 5.9 10e12/L 5.33   Hemoglobin      13.3 - 17.7 g/dL 16.2   Hematocrit      40.0 - 53.0 % 48.7   MCV      78 - 100 fl 91   MCH      26.5 - 33.0 pg 30.4   MCHC      31.5 - 36.5 g/dL 33.3   RDW      10.0 - 15.0 % 12.4   Platelet Count      150 - 450 10e9/L 224 "     Component      Latest Ref Rng & Units 3/6/2020   Sodium      133 - 144 mmol/L 141   Potassium      3.4 - 5.3 mmol/L 4.6   Chloride      94 - 109 mmol/L 110 (H)   Carbon Dioxide      20 - 32 mmol/L 21   Anion Gap      3 - 14 mmol/L 10   Glucose      70 - 99 mg/dL 88   Urea Nitrogen      7 - 30 mg/dL 21   Creatinine      0.66 - 1.25 mg/dL 1.26 (H)   GFR Estimate      >60 mL/min/1.73:m2 70   GFR Estimate If Black      >60 mL/min/1.73:m2 81   Calcium      8.5 - 10.1 mg/dL 9.8       4 views lumbar spine radiographs 2/25/2020 9:05 AM     History: Lumbar pain     Comparison: None available     Findings:     Standing  AP, lateral, flexion and extension views of the lumbar spine  were obtained.     5  lumbar type vertebral bodies are assumed for the purpose of this  dictation. Partial sacralization of L5 is suspected.     There is no acute osseous abnormality.       There is very mild multilevel degenerative changes of the lumbar  spine, with endplate remodeling.. There is also lower lumbar  predominant facet arthropathy      On the flexion/extension views, anterolisthesis of the L3 on L4 shows  no evidence of motion.     Clips within the right upper quadrant. The visualized bowel gas  pattern is non-obstructive.                                                                         Impression:  1.  No acute osseous abnormality.  2.  Multilevel mild endplate remodeling on all levels.  3. Transitional morphology at L5 suspected.  4. No change in alignment this flexion or extension.      Outside records reviewed from: n/a       ASSESSMENT and PLAN  Ortega Burroughs is a 39 year old male scheduled for Right Lumbar 5 to Sacral 1 microdiscectomy on 3/9/2020 by Dr. Waldrop in treatment of lumbar radiculopathy.  PAC referral for risk assessment and optimization for anesthesia with comorbid conditions of anxiety, GERD, smoking:    Pre-operative considerations:  1.  Cardiac:  Functional status- METS 1-2. Patient is limited by his  back and right leg pain.  He denies any cardiopulmonary symptoms.   Low risk surgery with 0.4% (RCRI 0) risk of major adverse cardiac event. Denies chest pain, chest pressure, SOB, GARCÍA, palpitations.  -pt [facility]  I do not have these records.  According to patient it was negative and his symptoms at the time were due to anxiety.      2.  Pulm:  Airway feasible.  DORIS risk: low  -current smoker, half pack per day    3.  GI:  Risk of PONV score = 2.  Pt endorses history of PONV after hernia surgery.  He states he has had success with scopolamine patch. Will place order.  -GERD, asymptomatic on omeprazole. May take DOS.    4.  MSK:  -lumbar radiculopathy, right leg pain.  Procedure as above.   -may take Norco/Lyrica DOS    5.  Psych:  -anxiety, seroquel at bedtime    VTE risk: 0.5%    Patient is optimized and is acceptable candidate for the proposed procedure.            Mariam Middleton PA-C  Preoperative Assessment Center  [facility]  [facility]  Phone: [phone]  Fax: [phone]

## 2020-03-09 ENCOUNTER — APPOINTMENT (OUTPATIENT)
Dept: GENERAL RADIOLOGY | Facility: CLINIC | Age: 40
End: 2020-03-09
Attending: ORTHOPAEDIC SURGERY
Payer: COMMERCIAL

## 2020-03-09 ENCOUNTER — HOSPITAL ENCOUNTER (OUTPATIENT)
Facility: CLINIC | Age: 40
Discharge: HOME | End: 2020-03-09
Attending: ORTHOPAEDIC SURGERY | Admitting: ORTHOPAEDIC SURGERY
Payer: COMMERCIAL

## 2020-03-09 ENCOUNTER — ANESTHESIA (OUTPATIENT)
Dept: SURGERY | Facility: CLINIC | Age: 40
End: 2020-03-09
Payer: COMMERCIAL

## 2020-03-09 VITALS
DIASTOLIC BLOOD PRESSURE: 78 MMHG | OXYGEN SATURATION: 98 % | RESPIRATION RATE: 16 BRPM | WEIGHT: 157.63 LBS | TEMPERATURE: 98.2 F | HEIGHT: 68 IN | HEART RATE: 95 BPM | BODY MASS INDEX: 23.89 KG/M2 | SYSTOLIC BLOOD PRESSURE: 116 MMHG

## 2020-03-09 DIAGNOSIS — M54.16 LUMBAR RADICULOPATHY: ICD-10-CM

## 2020-03-09 LAB
BASOPHILS # BLD AUTO: 0 10E9/L (ref 0–0.2)
BASOPHILS NFR BLD AUTO: 0.1 %
DIFFERENTIAL METHOD BLD: ABNORMAL
EOSINOPHIL # BLD AUTO: 0 10E9/L (ref 0–0.7)
EOSINOPHIL NFR BLD AUTO: 0.2 %
ERYTHROCYTE [DISTWIDTH] IN BLOOD BY AUTOMATED COUNT: 12.9 % (ref 10–15)
GLUCOSE BLDC GLUCOMTR-MCNC: 105 MG/DL (ref 70–99)
HCT VFR BLD AUTO: 41.9 % (ref 40–53)
HGB BLD-MCNC: 14.1 G/DL (ref 13.3–17.7)
IMM GRANULOCYTES # BLD: 0 10E9/L (ref 0–0.4)
IMM GRANULOCYTES NFR BLD: 0.2 %
LYMPHOCYTES # BLD AUTO: 1.3 10E9/L (ref 0.8–5.3)
LYMPHOCYTES NFR BLD AUTO: 13.3 %
MCH RBC QN AUTO: 30.9 PG (ref 26.5–33)
MCHC RBC AUTO-ENTMCNC: 33.7 G/DL (ref 31.5–36.5)
MCV RBC AUTO: 92 FL (ref 78–100)
MONOCYTES # BLD AUTO: 0.2 10E9/L (ref 0–1.3)
MONOCYTES NFR BLD AUTO: 2.2 %
NEUTROPHILS # BLD AUTO: 8.1 10E9/L (ref 1.6–8.3)
NEUTROPHILS NFR BLD AUTO: 84 %
NRBC # BLD AUTO: 0 10*3/UL
NRBC BLD AUTO-RTO: 0 /100
PLATELET # BLD AUTO: 143 10E9/L (ref 150–450)
RBC # BLD AUTO: 4.56 10E12/L (ref 4.4–5.9)
WBC # BLD AUTO: 9.7 10E9/L (ref 4–11)

## 2020-03-09 PROCEDURE — 40000985 XR LUMBAR SPINE PORT 1 VW

## 2020-03-09 PROCEDURE — 25000125 ZZHC RX 250: Performed by: ORTHOPAEDIC SURGERY

## 2020-03-09 PROCEDURE — 25000125 ZZHC RX 250: Performed by: NURSE ANESTHETIST, CERTIFIED REGISTERED

## 2020-03-09 PROCEDURE — 25000566 ZZH SEVOFLURANE, EA 15 MIN: Performed by: ORTHOPAEDIC SURGERY

## 2020-03-09 PROCEDURE — 37000008 ZZH ANESTHESIA TECHNICAL FEE, 1ST 30 MIN: Performed by: ORTHOPAEDIC SURGERY

## 2020-03-09 PROCEDURE — 25000128 H RX IP 250 OP 636: Performed by: ANESTHESIOLOGY

## 2020-03-09 PROCEDURE — 40000170 ZZH STATISTIC PRE-PROCEDURE ASSESSMENT II: Performed by: ORTHOPAEDIC SURGERY

## 2020-03-09 PROCEDURE — 25000128 H RX IP 250 OP 636: Performed by: ORTHOPAEDIC SURGERY

## 2020-03-09 PROCEDURE — 27210794 ZZH OR GENERAL SUPPLY STERILE: Performed by: ORTHOPAEDIC SURGERY

## 2020-03-09 PROCEDURE — 25000128 H RX IP 250 OP 636: Performed by: STUDENT IN AN ORGANIZED HEALTH CARE EDUCATION/TRAINING PROGRAM

## 2020-03-09 PROCEDURE — 85025 COMPLETE CBC W/AUTO DIFF WBC: CPT | Performed by: ANESTHESIOLOGY

## 2020-03-09 PROCEDURE — 36000066 ZZH SURGERY LEVEL 4 W FLUORO 1ST 30 MIN - UMMC: Performed by: ORTHOPAEDIC SURGERY

## 2020-03-09 PROCEDURE — 25000128 H RX IP 250 OP 636: Performed by: PHYSICIAN ASSISTANT

## 2020-03-09 PROCEDURE — 71000015 ZZH RECOVERY PHASE 1 LEVEL 2 EA ADDTL HR: Performed by: ORTHOPAEDIC SURGERY

## 2020-03-09 PROCEDURE — 00000146 ZZHCL STATISTIC GLUCOSE BY METER IP

## 2020-03-09 PROCEDURE — 36415 COLL VENOUS BLD VENIPUNCTURE: CPT | Performed by: ANESTHESIOLOGY

## 2020-03-09 PROCEDURE — 37000009 ZZH ANESTHESIA TECHNICAL FEE, EACH ADDTL 15 MIN: Performed by: ORTHOPAEDIC SURGERY

## 2020-03-09 PROCEDURE — 25000128 H RX IP 250 OP 636: Performed by: NURSE ANESTHETIST, CERTIFIED REGISTERED

## 2020-03-09 PROCEDURE — 25000132 ZZH RX MED GY IP 250 OP 250 PS 637: Performed by: ANESTHESIOLOGY

## 2020-03-09 PROCEDURE — 25000132 ZZH RX MED GY IP 250 OP 250 PS 637: Performed by: STUDENT IN AN ORGANIZED HEALTH CARE EDUCATION/TRAINING PROGRAM

## 2020-03-09 PROCEDURE — 71000027 ZZH RECOVERY PHASE 2 EACH 15 MINS: Performed by: ORTHOPAEDIC SURGERY

## 2020-03-09 PROCEDURE — 25000132 ZZH RX MED GY IP 250 OP 250 PS 637: Performed by: PHYSICIAN ASSISTANT

## 2020-03-09 PROCEDURE — 36000064 ZZH SURGERY LEVEL 4 EA 15 ADDTL MIN - UMMC: Performed by: ORTHOPAEDIC SURGERY

## 2020-03-09 PROCEDURE — 25000301 ZZH OR RX SURGIFLO W/THROMBIN KIT 2ML 1991 OPNP: Performed by: ORTHOPAEDIC SURGERY

## 2020-03-09 PROCEDURE — 25800030 ZZH RX IP 258 OP 636: Performed by: NURSE ANESTHETIST, CERTIFIED REGISTERED

## 2020-03-09 PROCEDURE — 71000014 ZZH RECOVERY PHASE 1 LEVEL 2 FIRST HR: Performed by: ORTHOPAEDIC SURGERY

## 2020-03-09 RX ORDER — MEPERIDINE HYDROCHLORIDE 25 MG/ML
12.5 INJECTION INTRAMUSCULAR; INTRAVENOUS; SUBCUTANEOUS
Status: DISCONTINUED | OUTPATIENT
Start: 2020-03-09 | End: 2020-03-12 | Stop reason: HOSPADM

## 2020-03-09 RX ORDER — FENTANYL CITRATE 50 UG/ML
25-50 INJECTION, SOLUTION INTRAMUSCULAR; INTRAVENOUS
Status: DISCONTINUED | OUTPATIENT
Start: 2020-03-09 | End: 2020-03-12 | Stop reason: HOSPADM

## 2020-03-09 RX ORDER — LIDOCAINE HYDROCHLORIDE 20 MG/ML
INJECTION, SOLUTION INFILTRATION; PERINEURAL PRN
Status: DISCONTINUED | OUTPATIENT
Start: 2020-03-09 | End: 2020-03-09

## 2020-03-09 RX ORDER — HYDROMORPHONE HYDROCHLORIDE 1 MG/ML
0.2 INJECTION, SOLUTION INTRAMUSCULAR; INTRAVENOUS; SUBCUTANEOUS ONCE
Status: COMPLETED | OUTPATIENT
Start: 2020-03-09 | End: 2020-03-09

## 2020-03-09 RX ORDER — EPHEDRINE SULFATE 50 MG/ML
INJECTION, SOLUTION INTRAMUSCULAR; INTRAVENOUS; SUBCUTANEOUS PRN
Status: DISCONTINUED | OUTPATIENT
Start: 2020-03-09 | End: 2020-03-09

## 2020-03-09 RX ORDER — ACETAMINOPHEN 325 MG/1
650 TABLET ORAL EVERY 4 HOURS PRN
Qty: 50 TABLET | Refills: 0 | Status: SHIPPED | OUTPATIENT
Start: 2020-03-09 | End: 2020-03-16

## 2020-03-09 RX ORDER — FENTANYL CITRATE 50 UG/ML
INJECTION, SOLUTION INTRAMUSCULAR; INTRAVENOUS PRN
Status: DISCONTINUED | OUTPATIENT
Start: 2020-03-09 | End: 2020-03-09

## 2020-03-09 RX ORDER — HYDROMORPHONE HYDROCHLORIDE 1 MG/ML
.3-.5 INJECTION, SOLUTION INTRAMUSCULAR; INTRAVENOUS; SUBCUTANEOUS EVERY 10 MIN PRN
Status: DISCONTINUED | OUTPATIENT
Start: 2020-03-09 | End: 2020-03-12 | Stop reason: HOSPADM

## 2020-03-09 RX ORDER — CEFAZOLIN SODIUM 1 G/3ML
1 INJECTION, POWDER, FOR SOLUTION INTRAMUSCULAR; INTRAVENOUS SEE ADMIN INSTRUCTIONS
Status: DISCONTINUED | OUTPATIENT
Start: 2020-03-09 | End: 2020-03-09 | Stop reason: HOSPADM

## 2020-03-09 RX ORDER — CEFAZOLIN SODIUM 2 G/100ML
2 INJECTION, SOLUTION INTRAVENOUS
Status: COMPLETED | OUTPATIENT
Start: 2020-03-09 | End: 2020-03-09

## 2020-03-09 RX ORDER — SODIUM CHLORIDE, SODIUM LACTATE, POTASSIUM CHLORIDE, CALCIUM CHLORIDE 600; 310; 30; 20 MG/100ML; MG/100ML; MG/100ML; MG/100ML
INJECTION, SOLUTION INTRAVENOUS CONTINUOUS PRN
Status: DISCONTINUED | OUTPATIENT
Start: 2020-03-09 | End: 2020-03-09

## 2020-03-09 RX ORDER — OXYCODONE HYDROCHLORIDE 5 MG/1
5-10 TABLET ORAL EVERY 4 HOURS PRN
Qty: 60 TABLET | Refills: 0 | Status: SHIPPED | OUTPATIENT
Start: 2020-03-09 | End: 2020-03-16

## 2020-03-09 RX ORDER — ONDANSETRON 2 MG/ML
INJECTION INTRAMUSCULAR; INTRAVENOUS PRN
Status: DISCONTINUED | OUTPATIENT
Start: 2020-03-09 | End: 2020-03-09

## 2020-03-09 RX ORDER — ACETAMINOPHEN 325 MG/1
975 TABLET ORAL ONCE
Status: COMPLETED | OUTPATIENT
Start: 2020-03-09 | End: 2020-03-09

## 2020-03-09 RX ORDER — SODIUM CHLORIDE, SODIUM LACTATE, POTASSIUM CHLORIDE, CALCIUM CHLORIDE 600; 310; 30; 20 MG/100ML; MG/100ML; MG/100ML; MG/100ML
INJECTION, SOLUTION INTRAVENOUS CONTINUOUS
Status: DISCONTINUED | OUTPATIENT
Start: 2020-03-09 | End: 2020-03-12 | Stop reason: HOSPADM

## 2020-03-09 RX ORDER — NALOXONE HYDROCHLORIDE 0.4 MG/ML
.1-.4 INJECTION, SOLUTION INTRAMUSCULAR; INTRAVENOUS; SUBCUTANEOUS
Status: DISCONTINUED | OUTPATIENT
Start: 2020-03-09 | End: 2020-03-10 | Stop reason: HOSPADM

## 2020-03-09 RX ORDER — PROPOFOL 10 MG/ML
INJECTION, EMULSION INTRAVENOUS PRN
Status: DISCONTINUED | OUTPATIENT
Start: 2020-03-09 | End: 2020-03-09

## 2020-03-09 RX ORDER — BUPIVACAINE HYDROCHLORIDE AND EPINEPHRINE 5; 5 MG/ML; UG/ML
INJECTION, SOLUTION PERINEURAL PRN
Status: DISCONTINUED | OUTPATIENT
Start: 2020-03-09 | End: 2020-03-12 | Stop reason: HOSPADM

## 2020-03-09 RX ORDER — ONDANSETRON 2 MG/ML
4 INJECTION INTRAMUSCULAR; INTRAVENOUS EVERY 30 MIN PRN
Status: DISCONTINUED | OUTPATIENT
Start: 2020-03-09 | End: 2020-03-12 | Stop reason: HOSPADM

## 2020-03-09 RX ORDER — VANCOMYCIN HYDROCHLORIDE 500 MG/10ML
INJECTION, POWDER, LYOPHILIZED, FOR SOLUTION INTRAVENOUS PRN
Status: DISCONTINUED | OUTPATIENT
Start: 2020-03-09 | End: 2020-03-12 | Stop reason: HOSPADM

## 2020-03-09 RX ORDER — HYDROCODONE BITARTRATE AND ACETAMINOPHEN 5; 325 MG/1; MG/1
2 TABLET ORAL ONCE
Status: COMPLETED | OUTPATIENT
Start: 2020-03-09 | End: 2020-03-09

## 2020-03-09 RX ORDER — OXYCODONE HYDROCHLORIDE 5 MG/1
5 TABLET ORAL EVERY 4 HOURS PRN
Status: DISCONTINUED | OUTPATIENT
Start: 2020-03-09 | End: 2020-03-12 | Stop reason: HOSPADM

## 2020-03-09 RX ORDER — ONDANSETRON 4 MG/1
4 TABLET, ORALLY DISINTEGRATING ORAL EVERY 30 MIN PRN
Status: DISCONTINUED | OUTPATIENT
Start: 2020-03-09 | End: 2020-03-12 | Stop reason: HOSPADM

## 2020-03-09 RX ORDER — GABAPENTIN 300 MG/1
300 CAPSULE ORAL
Status: COMPLETED | OUTPATIENT
Start: 2020-03-09 | End: 2020-03-09

## 2020-03-09 RX ORDER — AMOXICILLIN 250 MG
1-2 CAPSULE ORAL 2 TIMES DAILY
Qty: 30 TABLET | Refills: 0 | Status: SHIPPED | OUTPATIENT
Start: 2020-03-09 | End: 2020-06-02

## 2020-03-09 RX ORDER — SODIUM CHLORIDE, SODIUM LACTATE, POTASSIUM CHLORIDE, CALCIUM CHLORIDE 600; 310; 30; 20 MG/100ML; MG/100ML; MG/100ML; MG/100ML
INJECTION, SOLUTION INTRAVENOUS CONTINUOUS
Status: DISCONTINUED | OUTPATIENT
Start: 2020-03-09 | End: 2020-03-09 | Stop reason: HOSPADM

## 2020-03-09 RX ADMIN — ROCURONIUM BROMIDE 25 MG: 10 INJECTION INTRAVENOUS at 15:03

## 2020-03-09 RX ADMIN — FENTANYL CITRATE 50 MCG: 50 INJECTION, SOLUTION INTRAMUSCULAR; INTRAVENOUS at 16:18

## 2020-03-09 RX ADMIN — FENTANYL CITRATE 50 MCG: 50 INJECTION, SOLUTION INTRAMUSCULAR; INTRAVENOUS at 14:23

## 2020-03-09 RX ADMIN — FENTANYL CITRATE 50 MCG: 50 INJECTION, SOLUTION INTRAMUSCULAR; INTRAVENOUS at 16:25

## 2020-03-09 RX ADMIN — HYDROCODONE BITARTRATE AND ACETAMINOPHEN 2 TABLET: 5; 325 TABLET ORAL at 11:01

## 2020-03-09 RX ADMIN — FENTANYL CITRATE 25 MCG: 50 INJECTION, SOLUTION INTRAMUSCULAR; INTRAVENOUS at 16:33

## 2020-03-09 RX ADMIN — SODIUM CHLORIDE, POTASSIUM CHLORIDE, SODIUM LACTATE AND CALCIUM CHLORIDE: 600; 310; 30; 20 INJECTION, SOLUTION INTRAVENOUS at 15:16

## 2020-03-09 RX ADMIN — SODIUM CHLORIDE, POTASSIUM CHLORIDE, SODIUM LACTATE AND CALCIUM CHLORIDE: 600; 310; 30; 20 INJECTION, SOLUTION INTRAVENOUS at 14:17

## 2020-03-09 RX ADMIN — Medication 5 MG: at 14:25

## 2020-03-09 RX ADMIN — HYDROMORPHONE HYDROCHLORIDE 0.3 MG: 1 INJECTION, SOLUTION INTRAMUSCULAR; INTRAVENOUS; SUBCUTANEOUS at 18:32

## 2020-03-09 RX ADMIN — MIDAZOLAM 2 MG: 1 INJECTION INTRAMUSCULAR; INTRAVENOUS at 14:17

## 2020-03-09 RX ADMIN — HYDROMORPHONE HYDROCHLORIDE 0.5 MG: 1 INJECTION, SOLUTION INTRAMUSCULAR; INTRAVENOUS; SUBCUTANEOUS at 17:09

## 2020-03-09 RX ADMIN — PROPOFOL 200 MG: 10 INJECTION, EMULSION INTRAVENOUS at 14:24

## 2020-03-09 RX ADMIN — HYDROMORPHONE HYDROCHLORIDE 0.5 MG: 1 INJECTION, SOLUTION INTRAMUSCULAR; INTRAVENOUS; SUBCUTANEOUS at 17:43

## 2020-03-09 RX ADMIN — Medication 2 G: at 14:50

## 2020-03-09 RX ADMIN — ROCURONIUM BROMIDE 50 MG: 10 INJECTION INTRAVENOUS at 14:24

## 2020-03-09 RX ADMIN — FENTANYL CITRATE 50 MCG: 50 INJECTION, SOLUTION INTRAMUSCULAR; INTRAVENOUS at 16:14

## 2020-03-09 RX ADMIN — OXYCODONE HYDROCHLORIDE 5 MG: 5 TABLET ORAL at 18:32

## 2020-03-09 RX ADMIN — FENTANYL CITRATE 50 MCG: 50 INJECTION, SOLUTION INTRAMUSCULAR; INTRAVENOUS at 14:21

## 2020-03-09 RX ADMIN — GABAPENTIN 300 MG: 300 CAPSULE ORAL at 11:01

## 2020-03-09 RX ADMIN — ONDANSETRON 4 MG: 2 INJECTION INTRAMUSCULAR; INTRAVENOUS at 15:47

## 2020-03-09 RX ADMIN — OXYCODONE HYDROCHLORIDE 5 MG: 5 TABLET ORAL at 17:23

## 2020-03-09 RX ADMIN — ACETAMINOPHEN 325 MG: 325 TABLET, FILM COATED ORAL at 11:02

## 2020-03-09 RX ADMIN — ROCURONIUM BROMIDE 25 MG: 10 INJECTION INTRAVENOUS at 15:33

## 2020-03-09 RX ADMIN — SUGAMMADEX 160 MG: 100 INJECTION, SOLUTION INTRAVENOUS at 15:58

## 2020-03-09 RX ADMIN — HYDROMORPHONE HYDROCHLORIDE 0.2 MG: 1 INJECTION, SOLUTION INTRAMUSCULAR; INTRAVENOUS; SUBCUTANEOUS at 12:40

## 2020-03-09 RX ADMIN — HYDROMORPHONE HYDROCHLORIDE 0.5 MG: 1 INJECTION, SOLUTION INTRAMUSCULAR; INTRAVENOUS; SUBCUTANEOUS at 16:53

## 2020-03-09 RX ADMIN — FENTANYL CITRATE 25 MCG: 50 INJECTION, SOLUTION INTRAMUSCULAR; INTRAVENOUS at 16:42

## 2020-03-09 RX ADMIN — LIDOCAINE HYDROCHLORIDE 100 MG: 20 INJECTION, SOLUTION INFILTRATION; PERINEURAL at 14:24

## 2020-03-09 ASSESSMENT — MIFFLIN-ST. JEOR: SCORE: 1594.5

## 2020-03-09 ASSESSMENT — LIFESTYLE VARIABLES: TOBACCO_USE: 1

## 2020-03-09 NOTE — DISCHARGE INSTRUCTIONS
Same-Day Surgery   Adult Discharge Orders & Instructions     For 24 hours after surgery:  1. Get plenty of rest.  A responsible adult must stay with you for at least 24 hours after you leave the hospital.   2. Pain medication can slow your reflexes. Do not drive or use heavy equipment.  If you have weakness or tingling, don't drive or use heavy equipment until this feeling goes away.  3. Mixing alcohol and pain medication can cause dizziness and slow your breathing. It can even be fatal. Do not drink alcohol while taking pain medication.  4. Avoid strenuous or risky activities.  Ask for help when climbing stairs.   5. You may feel lightheaded.  If so, sit for a few minutes before standing.  Have someone help you get up.   6. If you have nausea (feel sick to your stomach), drink only clear liquids such as apple juice, ginger ale, broth or 7-Up.  Rest may also help.  Be sure to drink enough fluids.  Move to a regular diet as you feel able. Take pain medications with a small amount of solid food, such as toast or crackers, to avoid nausea.   7. A slight fever is normal. Call the doctor if your fever is over 100 F (37.7 C) (taken under the tongue) or lasts longer than 24 hours.  8. You may have a dry mouth, muscle aches, trouble sleeping or a sore throat.  These symptoms should go away after 24 hours.  9. Do not make important or legal decisions.   Pain Management:      1. Take pain medication (if prescribed) for pain as directed by your physician.        2. WARNING: If the pain medication you have been prescribed contains Tylenol  (acetaminophen), DO NOT take additional doses of Tylenol (acetaminophen).     Call your doctor for any of the followin.  Signs of infection (fever, growing tenderness at the surgery site, severe pain, a large amount of drainage or bleeding, foul-smelling drainage, redness, swelling).    2.  It has been over 8 to 10 hours since surgery and you are still not able to urinate (pee).    3.   Headache for over 24 hours.    4.  Numbness, tingling or weakness the day after surgery (if you had spinal anesthesia).  To contact a doctor, call Dr. Waldrop's clinic Mon-Fri or:      [phone] and ask for the Resident On Call for:          Ortho (answered 24 hours a day)      Emergency Department:  [facility]: [phone]  [facility]: [phone]               Rev. 10/2014

## 2020-03-09 NOTE — OP NOTE
DATE OF SURGERY: 3/9/2020    PREOPERATIVE DIAGNOSIS: Lumbar radiculopathy due to disc herniation            POSTOPERATIVE DIAGNOSIS: Same    PROCEDURES:  1. Right L5-S1 microdiscectomy    PRIMARY SURGEON: Dmitri Waldrop MD    FIRST ASSISTANT:[employer] PGY4.     ANESTHESIA: General Endotracheal    COMPLICATIONS:  None.    SPECIMENS: None.    ESTIMATED BLOOD LOSS: 10 mL    INDICATIONS:                          Ortega Burroughs is a 39 year old male who elected surgical treatment, and understood the indications for this surgery, as well as its risks, benefits, and alternatives as documented in the pre-operative H&P.  Specifically, we reviewed the risks and benefits of the surgery in detail. The risks include, but are not limited to, the general risks associated with anesthesia, including death, pulmonary embolism, DVT, stroke, myocardial infarction, pneumonia, and urinary tract infection. Additional risks specific to the surgery include the risk of infection, dural tear with resultant CSF leak which might necessitate placement of a drain or revision surgery or could result in headaches, nerve injury resulting in weakness or paralysis, risk of adjacent segment disease, the risks of vascular injury, need for revision surgery in the future due to one of the above issues, or risk of incomplete symptom relief. Ortega Burroughs understands the risks of the surgery and wishes to proceed.  No Guarantees were given.       DESCRIPTION OF PROCEDURE:           Ortega Burroughs was taken to the operating  room, where the Anesthesiology Service induced satisfactory general anesthesia.  Ancef was given IV.  Venous thromboembolic prophylaxis was performed with sequential devices.  A Murray catheter was placed under standard sterile techniques.  The patient was placed prone on an open OSI frame with the abdomen hanging free and all bony prominences well padded.  The low back was then prepped and draped in its entirety in the  usual sterile fashion.  We then held a multidisciplinary time out in which we verified the patient, procedure, antibiotics, and operative plan.  All team members were in agreement.    Digital radiography was brought into the sterile field to obtain a true lateral view and needles were placed to manas the intended point of incision.  We made a midline incision and a right subperiosteal exposure was performed of the L5 through S1 spinous processes and medial lamina.  A needle was placed into the medial facet joint at the caudal most level and a final image was then obtained to verify our position.     I then used a bur to take down the medial 2 to 3 mm of the facet joint and the leading edge of the L5 lamina.  I then undercut this with a curette.  This exposed the ligamentum flavum underneath.  I removed some slight additional bone and then detached the ligamentum flavum with a curette and removed it in a piecemeal fashion to expose the dura underneath.  I then mobilized the traversing S1 nerve root.  There was an obvious ventral disc herniation.  I incised the annulus with a 15 blade and I got about 1 cc of loose disc material out.  I then felt around with a [place] as well as a nerve hook and at this point I could not feel any further loose disc fragments or any further compression.  The nerve seemed quite free both ventrally and dorsally.    The wound was then thoroughly irrigated.  Hemostasis was achieved.  A hemovac drain was not placed.  The wound was closed in layers with vicryl suture, followed by monocryl and dermabond for the skin.  A sterile dressing was applied. The patient was turned supine, extubated, and returned to the recovery area in stable condition.      I was present and scrubbed for the entire procedure with the exception of final skin closure.    Dmitri Waldrop MD

## 2020-03-09 NOTE — ANESTHESIA CARE TRANSFER NOTE
Patient: Ortega Pride Manjeet    Procedure(s):  Right Lumbar 5 to Sacral 1 microdiscectomy    Diagnosis: Lumbar radiculopathy [M54.16]  Diagnosis Additional Information: No value filed.    Anesthesia Type:   General     Note:  Airway :Face Mask  Patient transferred to:PACU  Comments: Arrived in PACU, report to RN, vitals stable, patient awake and uncomfortable, receiving pain meds.  Handoff Report: Identifed the Patient, Identified the Reponsible Provider, Reviewed the pertinent medical history, Discussed the surgical course, Reviewed Intra-OP anesthesia mangement and issues during anesthesia, Set expectations for post-procedure period and Allowed opportunity for questions and acknowledgement of understanding      Vitals: (Last set prior to Anesthesia Care Transfer)    CRNA VITALS  3/9/2020 1542 - 3/9/2020 1623      3/9/2020             Resp Rate (observed):  14                Electronically Signed By: EFRAÍN Casanova CRNA  March 9, 2020  4:23 PM

## 2020-03-09 NOTE — BRIEF OP NOTE
[facility], [place]    Brief Operative Note    Pre-operative diagnosis: Lumbar radiculopathy [M54.16]  Post-operative diagnosis Same as pre-operative diagnosis    Procedure: Procedure(s):  Right Lumbar 5 to Sacral 1 microdiscectomy  Surgeon: Surgeon(s) and Role:     * Dmitri Waldrop MD - Primary  Anesthesia: General   Estimated blood loss: 10 mL  Drains: None  Specimens: * No specimens in log *  Findings:   See dictated operative report. Of note, during intubation patient was noted to have mass near uvula/tonsils that looked abnormal per anesthesia, and recommend follow up with PCP or ENT for further evaluation. Patient's PCP is through [facility].   Complications: None.  Implants: * No implants in log *

## 2020-03-09 NOTE — OR NURSING
Pt arrived with a temp of 101.2. Dr Abad and Dr Waldrop notified. Pt hasn't travelled or interacted with anyone who has travelled. Decision made to go ahead with surgery.   [Parents] : parents [___ Feeding per 24 hrs] : a total of [unfilled] feedings in 24 hours [Formula ___ oz/feed] : [unfilled] oz of formula per feed [Fruit] : fruit [Vegetables] : vegetables [___ stools per day] : [unfilled]  stools per day [Cereal] : cereal [Vitamin] : Primary Fluoride Source: Vitamin [Pacifier use] : Pacifier use [Normal] : Normal [No] : No cigarette smoke exposure [Tummy time] : Tummy time [FreeTextEntry7] : 6 month WCC.  Patient doing well.  No parental concerns. [FreeTextEntry8] : Sometimes skips a day for BM [FreeTextEntry3] : Sleeps through night

## 2020-03-09 NOTE — ANESTHESIA POSTPROCEDURE EVALUATION
Anesthesia POST Procedure Evaluation    Patient: Ortega Burroughs   MRN:     [id number] Gender:   male   Age:    39 year old :      1980        Preoperative Diagnosis: Lumbar radiculopathy [M54.16]   Procedure(s):  Right Lumbar 5 to Sacral 1 microdiscectomy   Postop Comments: No value filed.     Anesthesia Type: General       Disposition: Outpatient   Postop Pain Control: Uneventful            Sign Out: Well controlled pain   PONV: No   Neuro/Psych: Uneventful            Sign Out: Acceptable/Baseline neuro status   Airway/Respiratory: Uneventful            Sign Out: Acceptable/Baseline resp. status   CV/Hemodynamics: Uneventful            Sign Out: Acceptable CV status   Other NRE: NONE   DID A NON-ROUTINE EVENT OCCUR? No         Last Anesthesia Record Vitals:  CRNA VITALS  3/9/2020 1542 - 3/9/2020 1642      3/9/2020             Resp Rate (observed):  (!) 1          Last PACU Vitals:  Vitals Value Taken Time   /93 3/9/2020  5:45 PM   Temp 36.9  C (98.5  F) 3/9/2020  4:45 PM   Pulse 94 3/9/2020  5:45 PM   Resp 11 3/9/2020  5:49 PM   SpO2 98 % 3/9/2020  5:49 PM   Temp src     NIBP     Pulse     SpO2     Resp     Temp     Ht Rate     Temp 2     Vitals shown include unvalidated device data.      Electronically Signed By: Grant Ramirez MD, 2020, 5:50 PM

## 2020-03-13 ENCOUNTER — TELEPHONE (OUTPATIENT)
Dept: ORTHOPEDICS | Facility: CLINIC | Age: 40
End: 2020-03-13

## 2020-03-13 NOTE — TELEPHONE ENCOUNTER
Called patient back to discuss pain. We went over pain management strategies - using pain meds properly, rest, ice, etc. I let him know that it is very normal to have a significant amount of pain this close to having a spine surgery. I made sure that he has the resident on call number for the weekend and advised him to call it if he has and further serious questions or concerns. Patient was agreeable to plan and all questions were answered.

## 2020-03-13 NOTE — TELEPHONE ENCOUNTER
[facility] Call Center    Phone Message    May a detailed message be left on voicemail: yes     Reason for Call: Other: Pt would like a call back to discuss pain he is in after surgery and what is recommended . PLease contact pt to discuss     Action Taken: Message routed to:  [facility] ([facility]): Ortho    Travel Screening: Not Applicable

## 2020-03-16 DIAGNOSIS — M54.16 LUMBAR RADICULOPATHY: ICD-10-CM

## 2020-03-16 RX ORDER — ACETAMINOPHEN 325 MG/1
650 TABLET ORAL EVERY 6 HOURS PRN
Qty: 50 TABLET | Refills: 0 | Status: SHIPPED | OUTPATIENT
Start: 2020-03-16 | End: 2020-03-23

## 2020-03-16 RX ORDER — OXYCODONE HYDROCHLORIDE 5 MG/1
5-10 TABLET ORAL EVERY 4 HOURS PRN
Qty: 60 TABLET | Refills: 0 | Status: SHIPPED | OUTPATIENT
Start: 2020-03-16 | End: 2020-03-23

## 2020-03-16 NOTE — TELEPHONE ENCOUNTER
EXAM DATE/TIME:  03/24/2018 16:20 

 

[facility] COMPARISON:     

No previous studies available for comparison.

        

 

INDICATIONS :     Syncope.

                     

MEDICAL HISTORY :     Hypertension.     Tinitis. Aortic stenosis. Anticoagulant therapy. Kidney stone
s. Arthritis. Depression. Anxiety. 

SURGICAL HISTORY :     Tonsillectomy. Appendectomy.   Neck surgery. Cardiac catheterization. Right kn
ee surgery. Right hand surgery. 

ENCOUNTER:     Initial

ACUITY:     1 day

PAIN SCORE:     0/10

LOCATION:     Bilateral neck 

                     

PEAK SYSTOLIC VELOCITIES (cm/sec):

ICA/CCA RATIO:                    Right: 0.6     Left: 0.7

ICA:                          Right: 60.7     Left: 74.7

CCA:                          Right: 97.0     Left: 99.7

ECA:                           Right: 77.2     Left: 72.7

VERTEBRAL:           Right: 41.7 antegrade     Left: 39.5 antegrade

             

Elevated flow velocities and ICA/CCA ratios have been found to correlate with increased degrees of

vessel stenosis, calculated as percentage of diameter relative to a normal segment of distal ICA/CCA

 

FINDINGS:     

RIGHT CAROTID:     No significant stenosis is visualized.  The waveforms are within normal limits.

LEFT CAROTID:     No significant stenosis is visualized.  The waveforms are within normal limits.

VERTEBRAL ARTERIES:  Antegrade flow is seen in both vertebral arteries.

MISCELLANEOUS:     None.

 

CONCLUSION:     Negative for hemodynamically significant stenosis 

 Pranav Gunter MD FACR on March 24, 2018 at 16:57           

Board Certified Radiologist.

 This report was verified electronically. RN sent med refill to Jami MENDIETA to sign and authorize.    Kwaku Romano RN      [facility]    Phone Message    May a detailed message be left on voicemail: yes     Reason for Call: Medication Refill Request    Has the patient contacted the pharmacy for the refill? Yes   Name of medication being requested: 1) acetaminophen (TYLENOL) 325 MG tablet and 2) oxycodone   Provider who prescribed the medication: Dr. Andreas Waldrop  Pharmacy: ([facility] at [address] [place] [address]  Date medication is needed: asap. Thank you.         Action Taken: Message routed to:  [facility]    Travel Screening: Not Applicable

## 2020-03-23 DIAGNOSIS — M54.16 LUMBAR RADICULOPATHY: ICD-10-CM

## 2020-03-23 RX ORDER — OXYCODONE HYDROCHLORIDE 5 MG/1
5 TABLET ORAL EVERY 4 HOURS PRN
Qty: 50 TABLET | Refills: 0 | Status: SHIPPED | OUTPATIENT
Start: 2020-03-23 | End: 2020-04-01

## 2020-03-23 RX ORDER — ACETAMINOPHEN 325 MG/1
650 TABLET ORAL EVERY 6 HOURS PRN
Qty: 50 TABLET | Refills: 0 | Status: SHIPPED | OUTPATIENT
Start: 2020-03-23 | End: 2020-04-01

## 2020-03-23 NOTE — TELEPHONE ENCOUNTER
RN sent med refill request to Jami MENDIETA to sign and authorize.    Kwaku Romano RN      [facility]    Phone Message    May a detailed message be left on voicemail: yes     Reason for Call: Medication Refill Request    Has the patient contacted the pharmacy for the refill? Yes   Name of medication being requested: oxyCODONE (ROXICODONE) 5 MG tablet and acetaminophen (TYLENOL) 325 MG tablet  Provider who prescribed the medication: Dr. Waldrop  Pharmacy: [id number] - [place], [place] - [address] AT [facility]  Date medication is needed: as soon as posible         Action Taken: Message routed to:  [facility]    Travel Screening: Not Applicable

## 2020-04-01 ENCOUNTER — TELEPHONE (OUTPATIENT)
Dept: ORTHOPEDICS | Facility: CLINIC | Age: 40
End: 2020-04-01

## 2020-04-01 DIAGNOSIS — G89.18 POST-OP PAIN: Primary | ICD-10-CM

## 2020-04-01 DIAGNOSIS — M54.16 LUMBAR RADICULOPATHY: ICD-10-CM

## 2020-04-01 RX ORDER — ACETAMINOPHEN 325 MG/1
650 TABLET ORAL EVERY 6 HOURS PRN
Qty: 50 TABLET | Refills: 0 | Status: SHIPPED | OUTPATIENT
Start: 2020-04-01 | End: 2020-04-08

## 2020-04-01 RX ORDER — OXYCODONE HYDROCHLORIDE 5 MG/1
5 TABLET ORAL EVERY 6 HOURS PRN
Qty: 30 TABLET | Refills: 0 | Status: SHIPPED | OUTPATIENT
Start: 2020-04-01 | End: 2020-04-08

## 2020-04-01 NOTE — TELEPHONE ENCOUNTER
Refer previous encounter.    [facility]    Phone Message    May a detailed message be left on voicemail: yes     Reason for Call: Medication Refill Request    Has the patient contacted the pharmacy for the refill? Yes   Name of medication being requested: oxyCODONE (ROXICODONE) 5 MG tablet , acetaminophen (TYLENOL) 325 MG tablet  Provider who prescribed the medication: Benjie  Pharmacy: [facility] in [place]   Date medication is needed: 4/1/2020         Action Taken: Message routed to:  [facility]    Travel Screening: Not Applicable

## 2020-04-08 DIAGNOSIS — G89.18 POST-OP PAIN: ICD-10-CM

## 2020-04-08 DIAGNOSIS — M54.16 LUMBAR RADICULOPATHY: ICD-10-CM

## 2020-04-08 RX ORDER — OXYCODONE HYDROCHLORIDE 5 MG/1
5 TABLET ORAL EVERY 6 HOURS PRN
Qty: 30 TABLET | Refills: 0 | Status: SHIPPED | OUTPATIENT
Start: 2020-04-08 | End: 2020-04-15

## 2020-04-08 RX ORDER — ACETAMINOPHEN 325 MG/1
650 TABLET ORAL EVERY 6 HOURS PRN
Qty: 50 TABLET | Refills: 0 | Status: SHIPPED | OUTPATIENT
Start: 2020-04-08 | End: 2020-04-15

## 2020-04-08 NOTE — TELEPHONE ENCOUNTER
RN sent med refill to Jami MENDIETA to sign and authorize.    Kwaku Romnao RN      [facility]    Phone Message    May a detailed message be left on voicemail: yes     Reason for Call: Medication Refill Request    Has the patient contacted the pharmacy for the refill? Yes   Name of medication being requested: acetaminophen (TYLENOL) 325 MG tablet, oxyCODONE (ROXICODONE) 5 MG tablet  Provider who prescribed the medication: Benjie  Pharmacy: [facility] on file  Date medication is needed: 4/08/2020         Action Taken: Message routed to:  [facility]    Travel Screening: Not Applicable

## 2020-04-15 DIAGNOSIS — M54.16 LUMBAR RADICULOPATHY: Primary | ICD-10-CM

## 2020-04-15 DIAGNOSIS — G89.18 POST-OP PAIN: ICD-10-CM

## 2020-04-15 DIAGNOSIS — M54.16 LUMBAR RADICULOPATHY: ICD-10-CM

## 2020-04-15 RX ORDER — OXYCODONE HYDROCHLORIDE 5 MG/1
5 TABLET ORAL EVERY 6 HOURS PRN
Qty: 30 TABLET | Refills: 0 | Status: SHIPPED | OUTPATIENT
Start: 2020-04-15 | End: 2020-06-02

## 2020-04-15 RX ORDER — ACETAMINOPHEN 325 MG/1
650 TABLET ORAL EVERY 6 HOURS PRN
Qty: 50 TABLET | Refills: 0 | Status: ON HOLD | OUTPATIENT
Start: 2020-04-15 | End: 2020-06-04

## 2020-04-15 NOTE — TELEPHONE ENCOUNTER
RN send med refill to Jeff MENDIETA to sign and authorize.    Kwaku Romano RN      [facility]    Phone Message    May a detailed message be left on voicemail: yes     Reason for Call: Medication Refill Request    Has the patient contacted the pharmacy for the refill? Yes   Name of medication being requested:oxyCODONE (ROXICODONE) 5 MG tablet  AND acetaminophen (TYLENOL) 325 MG tablet   Provider who prescribed the medication: [id number] - [place], [place] - [address] AT [facility]   Pharmacy:   Date medication is needed: 4/15/200        Action Taken: Message routed to:  [facility]     Travel Screening: Not Applicable

## 2020-04-17 ENCOUNTER — TELEPHONE (OUTPATIENT)
Dept: ORTHOPEDICS | Facility: CLINIC | Age: 40
End: 2020-04-17

## 2020-04-21 ENCOUNTER — VIRTUAL VISIT (OUTPATIENT)
Dept: ORTHOPEDICS | Facility: CLINIC | Age: 40
End: 2020-04-21
Payer: COMMERCIAL

## 2020-04-21 DIAGNOSIS — M54.16 LUMBAR RADICULOPATHY: Primary | ICD-10-CM

## 2020-04-21 NOTE — LETTER
"4/21/2020       RE: Ortega Burroughs  [address] [place] [address]     Dear Colleague,    Thank you for referring your patient, Ortega Burroughs, to the [facility] at [facility]. Please see a copy of my visit note below.    Ortega uBrroughs is a 39 year old male who is being evaluated via a billable telephone visit.      The patient has been notified of following:     \"This telephone visit will be conducted via a call between you and your physician/provider. We have found that certain health care needs can be provided without the need for a physical exam.  This service lets us provide the care you need with a short phone conversation.  If a prescription is necessary we can send it directly to your pharmacy.  If lab work is needed we can place an order for that and you can then stop by our lab to have the test done at a later time.    Telephone visits are billed at different rates depending on your insurance coverage. During this emergency period, for some insurers they may be billed the same as an in-person visit.  Please reach out to your insurance provider with any questions.    If during the course of the call the physician/provider feels a telephone visit is not appropriate, you will not be charged for this service.\"    Patient has given verbal consent for Telephone visit?  Yes    How would you like to obtain your AVS? MyChart    Subjective     Ortega Burroughs is a 39 year old male who presents to clinic today for the following health issues:    HPI  Reviewed and updated as needed this visit by Provider         Review of Systems     Objective   Reported vitals:  There were no vitals taken for this visit.           Ortega is 5 weeks status post an L5-S1 microdiscectomy.  He is doing really well.  He says this is the best is his back is ever felt.  He is really happy with his result.  He is not any wound problems.  He says the leg symptoms " are gone.  Occasionally he will get a very slight twinge in his back but he says it is only like a mosquito and it really does not bother him very much so he is really quite pleased.    I counseled him he can return to activities as tolerated at this point and I do not have any specific restrictions for him.  I asked him to use commonsense and start with low impact things such as swimming and biking and he was interested in doing this.  If he has any new questions or concerns in the future he will reach out to us, otherwise follow-up as needed, and I am very pleased he is doing well with resolution of his preoperative symptoms.    Assessment/Plan:      No follow-ups on file.      Phone call duration: 8 minutes    Dmitri Waldrop MD

## 2020-04-21 NOTE — PROGRESS NOTES
"Ortega Burroughs is a 39 year old male who is being evaluated via a billable telephone visit.      The patient has been notified of following:     \"This telephone visit will be conducted via a call between you and your physician/provider. We have found that certain health care needs can be provided without the need for a physical exam.  This service lets us provide the care you need with a short phone conversation.  If a prescription is necessary we can send it directly to your pharmacy.  If lab work is needed we can place an order for that and you can then stop by our lab to have the test done at a later time.    Telephone visits are billed at different rates depending on your insurance coverage. During this emergency period, for some insurers they may be billed the same as an in-person visit.  Please reach out to your insurance provider with any questions.    If during the course of the call the physician/provider feels a telephone visit is not appropriate, you will not be charged for this service.\"    Patient has given verbal consent for Telephone visit?  Yes    How would you like to obtain your AVS? MyChart    Subjective     Ortega Burroughs is a 39 year old male who presents to clinic today for the following health issues:    HPI  Reviewed and updated as needed this visit by Provider         Review of Systems     Objective   Reported vitals:  There were no vitals taken for this visit.           Ortega is 5 weeks status post an L5-S1 microdiscectomy.  He is doing really well.  He says this is the best is his back is ever felt.  He is really happy with his result.  He is not any wound problems.  He says the leg symptoms are gone.  Occasionally he will get a very slight twinge in his back but he says it is only like a mosquito and it really does not bother him very much so he is really quite pleased.    I counseled him he can return to activities as tolerated at this point and I do not have any specific " restrictions for him.  I asked him to use commonsense and start with low impact things such as swimming and biking and he was interested in doing this.  If he has any new questions or concerns in the future he will reach out to us, otherwise follow-up as needed, and I am very pleased he is doing well with resolution of his preoperative symptoms.    Assessment/Plan:      No follow-ups on file.      Phone call duration: 8 minutes    Dmitri Waldrop MD

## 2020-05-06 ENCOUNTER — TELEPHONE (OUTPATIENT)
Dept: ORTHOPEDICS | Facility: CLINIC | Age: 40
End: 2020-05-06

## 2020-05-06 DIAGNOSIS — M54.16 LUMBAR RADICULOPATHY: Primary | ICD-10-CM

## 2020-05-06 NOTE — TELEPHONE ENCOUNTER
Per Dr. Waldrop's instructions. RN asked  to schedule patient for lumbar MRI. 4.08pm.    RN returned patient's call. Patient reported new pain, patient is a little 6 weeks post op. Patient stated the [facility] was [facility] I told patient I am going to notify Dr. Waldrop regarding this. RN also being proactive and put patient on his schedule for next Friday.   Patient expressed understanding.  RN reached out to [facility] to help schedule patient. RN notify Dr. Waldrop and REJI Heller in case he wants patient to get imaging.    Kwaku Romano RN      [facility]    Phone Message    May a detailed message be left on voicemail: yes     Reason for Call: Symptoms or Concerns     If patient has red-flag symptoms, warm transfer to triage line    Current symptom or concern: Pain located on back, glutes, and right side of leg    Symptoms have been present for: prior to the sugery        Are there any new or worsening symptoms? Yes: Pt stated the pain comes and goes and sometimes the pain gets worse when it comes back.       Action Taken: Message routed to:  [facility]    Travel Screening: Not Applicable

## 2020-05-07 ENCOUNTER — TELEPHONE (OUTPATIENT)
Dept: ORTHOPEDICS | Facility: CLINIC | Age: 40
End: 2020-05-07

## 2020-05-07 NOTE — TELEPHONE ENCOUNTER
----- Message from Kwaku Romano RN sent at 5/6/2020  4:06 PM CDT -----  Please schedule patient for lumbar MRI ideally some time this week before patient's appt with Dr. Waldrop next Friday. This is urgent essential. Then please call patient to inform of this plan.     Thank you    Alexandra

## 2020-05-08 ENCOUNTER — TELEPHONE (OUTPATIENT)
Dept: ORTHOPEDICS | Facility: CLINIC | Age: 40
End: 2020-05-08

## 2020-05-08 NOTE — TELEPHONE ENCOUNTER
See encounter notes.    [facility]    Phone Message    May a detailed message be left on voicemail: yes     Reason for Call: Other: Patient is requesting a RX for oral sedation prior to his upcoming MRI on Tuesday.  Please call to discuss.      Action Taken: Message routed to:  [facility]    Travel Screening: Not Applicable

## 2020-05-08 NOTE — TELEPHONE ENCOUNTER
Per patient's request, RN called into pharmacy for Valium 5mg. Two tabs taken 20 minutes prior to MRI.   Not able to e-prescibe therefore RN calling it in.  RN notified patient and he is aware and expressed understanding.    Kwaku Romano RN

## 2020-05-12 ENCOUNTER — ANCILLARY PROCEDURE (OUTPATIENT)
Dept: MRI IMAGING | Facility: CLINIC | Age: 40
End: 2020-05-12
Attending: ORTHOPAEDIC SURGERY
Payer: COMMERCIAL

## 2020-05-12 DIAGNOSIS — M54.16 LUMBAR RADICULOPATHY: ICD-10-CM

## 2020-05-15 ENCOUNTER — VIRTUAL VISIT (OUTPATIENT)
Dept: ORTHOPEDICS | Facility: CLINIC | Age: 40
End: 2020-05-15
Payer: COMMERCIAL

## 2020-05-15 DIAGNOSIS — M51.16 LUMBAR DISC HERNIATION WITH RADICULOPATHY: Primary | ICD-10-CM

## 2020-05-15 DIAGNOSIS — Z11.59 ENCOUNTER FOR SCREENING FOR OTHER VIRAL DISEASES: Primary | ICD-10-CM

## 2020-05-15 NOTE — NURSING NOTE
Reason For Visit:   Chief Complaint   Patient presents with     Follow Up     6 week follow up L5-S1 microdiscectmy        There were no vitals taken for this visit.         Arron Chopra, ATC

## 2020-05-15 NOTE — PROGRESS NOTES
"Ortega Burroughs is a 39 year old male who is being evaluated via a billable telephone visit.      The patient has been notified of following:     \"This telephone visit will be conducted via a call between you and your physician/provider. We have found that certain health care needs can be provided without the need for a physical exam.  This service lets us provide the care you need with a short phone conversation.  If a prescription is necessary we can send it directly to your pharmacy.  If lab work is needed we can place an order for that and you can then stop by our lab to have the test done at a later time.    Telephone visits are billed at different rates depending on your insurance coverage. During this emergency period, for some insurers they may be billed the same as an in-person visit.  Please reach out to your insurance provider with any questions.    If during the course of the call the physician/provider feels a telephone visit is not appropriate, you will not be charged for this service.\"    Patient has given verbal consent for Telephone visit?  Yes    What phone number would you like to be contacted at? Home    How would you like to obtain your AVS? Zev    Phone call duration: 8 minutes    I called and spoke with Ortega today.  At his first postoperative visit with me on April 17 he was really doing quite well.  Then suddenly in early May he had a return of right greater than left leg symptoms and I became concerned that he had a recurrent disc herniation.  I sent him for a lumbar MRI and the purpose of the phone call is to review his current status.    He says again he is having symptoms go down the back of the right leg.  They are about 40 to 50% less than they were before surgery so not as bad but they are still quite bothersome to him.  I did review his recent lumbar MRI from this past week which appears to show a combination of granulation tissue as well as either recurrent or residual disc " herniation in the right lateral recess impinging the traversing S1 nerve root.    He is taking pregabalin and Tylenol for pain.  I suggested that we try a repeat right L5-S1 transforaminal epidural injection.  I would like to follow-up with him again in clinic 1 to 2 weeks after that.  Since he does not have per his report gross neurologic deficit I do not think there is an urgency to revision surgery at this time and we should give a little bit of time to see if it will get better on its own.  I will continue to follow him closely.  He does know that the ultimate last resort option would potentially be a revision discectomy but since he has had some partial relief with no neurologic deficit we will treat things conservatively for the time being.    Dmitri Waldrop MD

## 2020-05-18 ENCOUNTER — TELEPHONE (OUTPATIENT)
Dept: ORTHOPEDICS | Facility: CLINIC | Age: 40
End: 2020-05-18

## 2020-05-18 NOTE — TELEPHONE ENCOUNTER
[facility]    Phone Message    May a detailed message be left on voicemail: yes     Reason for Call: Other: Pt requesting call back to discuss an injection appt. Pt stated during his last appt with Dr. Waldrop, he wanted the pt to have an injection done either last friday or today, and he never heard anything about scheduling it. Pt then stated Dr. Waldrop wanted to see him for an appt 2 weeks after the injection. There is an injection scheduled for 5/26 but the pt did not know about it and needs clarification     Action Taken: Message routed to:  [facility]o

## 2020-05-18 NOTE — TELEPHONE ENCOUNTER
Called vinh back and let him know that we went with that appointment time due to [facility] not [facility] him to [facility] and that the 26th in [facility] is the earliest we can get him in for one here at [place]. I apologized for no one contacting him about this being set up. He thanked me for the call and was in agreement with the plan to do the injection here instead of [facility] because the issues last time with the injection prior to surgery.

## 2020-05-21 RX ORDER — NICOTINE POLACRILEX 4 MG
15-30 LOZENGE BUCCAL
Status: CANCELLED | OUTPATIENT
Start: 2020-05-21

## 2020-05-21 RX ORDER — DEXTROSE MONOHYDRATE 25 G/50ML
25-50 INJECTION, SOLUTION INTRAVENOUS
Status: CANCELLED | OUTPATIENT
Start: 2020-05-21

## 2020-05-22 ENCOUNTER — TELEPHONE (OUTPATIENT)
Dept: MEDSURG UNIT | Facility: CLINIC | Age: 40
End: 2020-05-22

## 2020-05-22 NOTE — TELEPHONE ENCOUNTER
Pre-Procedure Unconfirmed COVID Test     Ortega [facility] [place]    COVID Screening  Due to the inability to confirm the patient's COVID status (positive or negative), the patient was screened for COVID symptoms     Patient reports the following:  Fever? No   Cough? No   Shortness of breath? No   Skin rash? No    If the patient is positive for new symptoms or worsening symptoms, and the procedure is deemed necessary by the ordering provider, notify your manager/supervisor     Patient Information  Patient informed of the no visitor policy  Patient instructed to continue to self-quarantine prior to procedure  Patient informed to contact the ordering provider if the following symptoms develop prior to procedure:   Fever  Cough  Shortness of Breath  Sore throat   Runny or stuffy nose  Muscle or body aches  Headaches  Fatigue  Vomiting or diarrhea   Rash    Morales Ash RN

## 2020-05-23 DIAGNOSIS — Z11.59 ENCOUNTER FOR SCREENING FOR OTHER VIRAL DISEASES: ICD-10-CM

## 2020-05-23 PROCEDURE — 99000 SPECIMEN HANDLING OFFICE-LAB: CPT | Performed by: ORTHOPAEDIC SURGERY

## 2020-05-23 PROCEDURE — U0003 INFECTIOUS AGENT DETECTION BY NUCLEIC ACID (DNA OR RNA); SEVERE ACUTE RESPIRATORY SYNDROME CORONAVIRUS 2 (SARS-COV-2) (CORONAVIRUS DISEASE [COVID-19]), AMPLIFIED PROBE TECHNIQUE, MAKING USE OF HIGH THROUGHPUT TECHNOLOGIES AS DESCRIBED BY CMS-2020-01-R: HCPCS | Mod: 90 | Performed by: ORTHOPAEDIC SURGERY

## 2020-05-24 LAB
SARS-COV-2 RNA SPEC QL NAA+PROBE: NOT DETECTED
SPECIMEN SOURCE: NORMAL

## 2020-05-26 ENCOUNTER — HOSPITAL ENCOUNTER (OUTPATIENT)
Facility: CLINIC | Age: 40
Discharge: HOME | End: 2020-05-26
Payer: COMMERCIAL

## 2020-05-26 ENCOUNTER — NURSE TRIAGE (OUTPATIENT)
Dept: NURSING | Facility: CLINIC | Age: 40
End: 2020-05-26

## 2020-05-26 ENCOUNTER — HOSPITAL ENCOUNTER (OUTPATIENT)
Dept: GENERAL RADIOLOGY | Facility: CLINIC | Age: 40
Discharge: HOME | End: 2020-05-26
Attending: ORTHOPAEDIC SURGERY | Admitting: ORTHOPAEDIC SURGERY
Payer: COMMERCIAL

## 2020-05-26 VITALS
OXYGEN SATURATION: 99 % | RESPIRATION RATE: 18 BRPM | DIASTOLIC BLOOD PRESSURE: 68 MMHG | SYSTOLIC BLOOD PRESSURE: 114 MMHG | HEART RATE: 71 BPM | TEMPERATURE: 97.9 F

## 2020-05-26 PROCEDURE — 25000128 H RX IP 250 OP 636: Performed by: PHYSICIAN ASSISTANT

## 2020-05-26 PROCEDURE — 25500064 ZZH RX 255 OP 636: Performed by: PHYSICIAN ASSISTANT

## 2020-05-26 PROCEDURE — 25000125 ZZHC RX 250: Performed by: PHYSICIAN ASSISTANT

## 2020-05-26 PROCEDURE — 64483 NJX AA&/STRD TFRM EPI L/S 1: CPT

## 2020-05-26 PROCEDURE — 40000863 ZZH STATISTIC RADIOLOGY XRAY, US, CT, MAR, NM

## 2020-05-26 RX ORDER — DEXTROSE MONOHYDRATE 25 G/50ML
25-50 INJECTION, SOLUTION INTRAVENOUS
Status: DISCONTINUED | OUTPATIENT
Start: 2020-05-26 | End: 2020-05-26 | Stop reason: HOSPADM

## 2020-05-26 RX ORDER — NICOTINE POLACRILEX 4 MG
15-30 LOZENGE BUCCAL
Status: DISCONTINUED | OUTPATIENT
Start: 2020-05-26 | End: 2020-05-26 | Stop reason: HOSPADM

## 2020-05-26 RX ORDER — LIDOCAINE HYDROCHLORIDE 10 MG/ML
30 INJECTION, SOLUTION EPIDURAL; INFILTRATION; INTRACAUDAL; PERINEURAL ONCE
Status: COMPLETED | OUTPATIENT
Start: 2020-05-26 | End: 2020-05-26

## 2020-05-26 RX ORDER — IOPAMIDOL 408 MG/ML
10 INJECTION, SOLUTION INTRATHECAL ONCE
Status: COMPLETED | OUTPATIENT
Start: 2020-05-26 | End: 2020-05-26

## 2020-05-26 RX ORDER — DEXAMETHASONE SODIUM PHOSPHATE 10 MG/ML
20 INJECTION, SOLUTION INTRAMUSCULAR; INTRAVENOUS ONCE
Status: COMPLETED | OUTPATIENT
Start: 2020-05-26 | End: 2020-05-26

## 2020-05-26 RX ADMIN — LIDOCAINE HYDROCHLORIDE 8 ML: 10 INJECTION, SOLUTION EPIDURAL; INFILTRATION; INTRACAUDAL; PERINEURAL at 10:51

## 2020-05-26 RX ADMIN — DEXAMETHASONE SODIUM PHOSPHATE 20 MG: 10 INJECTION, SOLUTION INTRAMUSCULAR; INTRAVENOUS at 10:51

## 2020-05-26 RX ADMIN — IOPAMIDOL 1 ML: 408 INJECTION, SOLUTION INTRATHECAL at 10:50

## 2020-05-26 NOTE — PROGRESS NOTES
Care Suites Admission Nursing Note    Patient Information  Name: Ortega Burroughs  Age: 39 year old  Reason for admission: EPI  Care Suites arrival time: 0930    Patient Admission/Assessment   Pre-procedure assessment complete: Yes  If abnormal assessment/labs, provider notified: N/A  NPO: N/A  Medications held per instructions/orders: N/A  Consent: deferred  If applicable, pregnancy test status: deferred  Patient oriented to room: Yes  Education/questions answered: Yes  Plan/other: proceed as planned discharge instruction reviewed prior to EPI, pt understands and accepts    Discharge Planning  Accompanied by: self  Discharge name/phone number: Bib   Overnight post sedation caregiver: na    Discharge location: home    Ludin Lorenzo RN

## 2020-05-26 NOTE — DISCHARGE INSTRUCTIONS
Steroid Injection Discharge Instructions     After you go home:      You may resume your normal diet.    Care of Puncture Site:      If you have a bandaid on your puncture site, you may remove it the next morning    You may shower tomorrow    No bath tubs, whirlpools or swimming for at least 3 days     Activity:      You may go back to normal activity in 24 hours    You should let pain be your guide as to the extent of your activities    Maintain any activity limitations as ordered by your provider    Do NOT drive a vehicle if you develop numbness in your arm or leg    Medicines:      You may resume all medications    For minor pain, you may take Acetaminophen (Tylenol) or Ibuprofen (Advil)    Pain:       You may experience increased or different pain over the next 24-48 hours    For the next 48 hrs - you may use ice packs for discomfort     Call your primary care doctor if:      You have severe pain that does not improve with pain medication    You have chills or a fever greater than 101 F (38 C)    The site is red, swollen, hot or tender    New problems with your bowel or bladder    Any questions or concerns    Other Instructions:      New numbness down your leg post injection is temporary and may last for up to 6 hours. You may need assistance with activity until your leg has normal sensation.    If you are diabetic, monitor your blood sugar closely. Contact the provider who manages your diabetes to help you control your blood sugar if needed.    For Your Information:      A steroid was injected to help decrease swelling and may help to reduce pain. It may take up to 7-10 days to obtain full results.    Some patients will get lasting relief from a single injection. Others may require up to 3 injections to get results. If you have more than one steroid injection, they should be given 2 weeks apart.    Side effects of your steroid injection are mild and will go away in 2-3 days  - Insomnia  - Heartburn  - Flushed  face  - Water retention  - Increased appetite  - Increased blood sugar      If you have questions call:        [facility] Radiology Dept @ [phone]      The provider who performed your procedure was Dr Hebert

## 2020-05-26 NOTE — PROGRESS NOTES
Care Suites Discharge Nursing Note    Patient Information  Name: Ortega Burroughs  Age: 39 year old    Discharge Education:  Discharge instructions reviewed: Yes  Additional education/resources provided: N/A  Patient/patient representative verbalizes understanding: Yes  Patient discharging on new medications: No  Medication education completed: N/A    Discharge Plans:   Discharge location: home  Discharge ride contacted: Yes  Approximate discharge time: 1130    Discharge Criteria:  Discharge criteria met and vital signs stable: Yes    Patient Belongs:  Patient belongings returned to patient: Yes    Stacy Tucker RN

## 2020-05-27 ENCOUNTER — MYC MEDICAL ADVICE (OUTPATIENT)
Dept: ORTHOPEDICS | Facility: CLINIC | Age: 40
End: 2020-05-27

## 2020-05-27 NOTE — TELEPHONE ENCOUNTER
Patient calling reporting he has severe back pain. States he received steroid injection earlier today at [facility]. States he was not prescribed any pain medications to go home with and is in severe pain. Advised patient to be seen at the emergency department. Caller verbalized understanding. Denies further questions.          Suresh Law RN  [facility]

## 2020-05-28 NOTE — TELEPHONE ENCOUNTER
I called Ortega last night about 830pm about his symptoms but was not able to reach him.  I will try again today and we have also offered him an urgent clinic appointment to discuss.    Dmitri Waldrop MD

## 2020-05-29 ENCOUNTER — TELEPHONE (OUTPATIENT)
Dept: ORTHOPEDICS | Facility: CLINIC | Age: 40
End: 2020-05-29

## 2020-05-29 DIAGNOSIS — Z11.59 ENCOUNTER FOR SCREENING FOR OTHER VIRAL DISEASES: Primary | ICD-10-CM

## 2020-05-29 NOTE — TELEPHONE ENCOUNTER
Patient is scheduled for surgery with Dr. Waldrop    Spoke or left message with: Patient    Date of Surgery: 6/4/20    Location: [facility]    Post op: 6 weeks & 3 months    Pre-op with surgeon (if applicable): 6/2/20    H&P: Scheduled with PAC 6/2/20    Additional imaging/appointments: N/A    Surgery packet: Will receive on 6/2    Additional comments: Patient aware of need for COVID test, will request to schedule at [facility] on 6/2 around his other appointments

## 2020-06-01 ENCOUNTER — TELEPHONE (OUTPATIENT)
Dept: ORTHOPEDICS | Facility: CLINIC | Age: 40
End: 2020-06-01

## 2020-06-01 ENCOUNTER — TELEPHONE (OUTPATIENT)
Dept: SURGERY | Facility: CLINIC | Age: 40
End: 2020-06-01

## 2020-06-01 PROBLEM — K21.9 ESOPHAGEAL REFLUX: Status: ACTIVE | Noted: 2020-06-01

## 2020-06-01 PROBLEM — S83.249A TEAR OF MEDIAL MENISCUS OF KNEE JOINT: Status: ACTIVE | Noted: 2020-06-01

## 2020-06-01 PROBLEM — R25.1 TREMOR: Status: ACTIVE | Noted: 2020-06-01

## 2020-06-01 PROBLEM — M54.50 CHRONIC LOW BACK PAIN: Status: ACTIVE | Noted: 2020-06-01

## 2020-06-01 PROBLEM — G89.29 CHRONIC LOW BACK PAIN: Status: ACTIVE | Noted: 2020-06-01

## 2020-06-01 PROBLEM — K40.90 INGUINAL HERNIA: Status: ACTIVE | Noted: 2020-06-01

## 2020-06-01 PROBLEM — F41.0 PANIC DISORDER: Status: ACTIVE | Noted: 2020-06-01

## 2020-06-01 PROBLEM — F17.200 TOBACCO USE DISORDER: Status: ACTIVE | Noted: 2020-06-01

## 2020-06-01 PROBLEM — E78.5 OTHER AND UNSPECIFIED HYPERLIPIDEMIA: Status: ACTIVE | Noted: 2020-06-01

## 2020-06-01 PROBLEM — G47.00 INSOMNIA: Status: ACTIVE | Noted: 2020-06-01

## 2020-06-01 PROBLEM — G89.4 CHRONIC PAIN DISORDER: Status: ACTIVE | Noted: 2020-06-01

## 2020-06-01 PROBLEM — F41.9 ANXIETY: Status: ACTIVE | Noted: 2020-06-01

## 2020-06-01 PROBLEM — R10.9 ABDOMINAL PAIN OF OTHER SPECIFIED SITE: Status: ACTIVE | Noted: 2020-06-01

## 2020-06-01 PROBLEM — M25.569 KNEE PAIN: Status: ACTIVE | Noted: 2020-06-01

## 2020-06-01 PROBLEM — Z00.00 ROUTINE GENERAL MEDICAL EXAMINATION AT A HEALTH CARE FACILITY: Status: ACTIVE | Noted: 2020-06-01

## 2020-06-01 PROBLEM — F06.30 MOOD DISORDER IN CONDITIONS CLASSIFIED ELSEWHERE: Status: ACTIVE | Noted: 2020-06-01

## 2020-06-01 PROBLEM — H61.009 PERICHONDRITIS OF PINNA: Status: ACTIVE | Noted: 2020-06-01

## 2020-06-01 PROBLEM — M12.539: Status: ACTIVE | Noted: 2020-06-01

## 2020-06-01 ASSESSMENT — ENCOUNTER SYMPTOMS
STIFFNESS: 1
ARTHRALGIAS: 1
INSOMNIA: 0
MUSCLE WEAKNESS: 1
JOINT SWELLING: 0
PANIC: 0
NECK PAIN: 0
MYALGIAS: 1
NERVOUS/ANXIOUS: 1
BACK PAIN: 1
MUSCLE CRAMPS: 1
DECREASED CONCENTRATION: 0
DEPRESSION: 0

## 2020-06-01 NOTE — TELEPHONE ENCOUNTER
FUTURE VISIT INFORMATION      SURGERY INFORMATION:    Date: 6/4/20    Location: [facility]    Surgeon:  Dmitri Waldrop MD    Anesthesia Type:  General    Procedure: Revision Right Lumbar 5-Sacral 1 Microdiscectomy    Consult: Virtual visit 5/15    RECORDS REQUESTED FROM:       [facility]

## 2020-06-01 NOTE — TELEPHONE ENCOUNTER
RN called patient the first time to inform him of the schedule change to afternoon visit but then realized he has a PAC appt. So RN told patient he will call him back after consulting with Arron.  Arron then told RN we just have to keep his visit as is.  RN then called patient back and told patient that and he expressed understanding.    Kwaku Romano RN

## 2020-06-02 ENCOUNTER — OFFICE VISIT (OUTPATIENT)
Dept: SURGERY | Facility: CLINIC | Age: 40
End: 2020-06-02
Payer: COMMERCIAL

## 2020-06-02 ENCOUNTER — ANESTHESIA EVENT (OUTPATIENT)
Dept: SURGERY | Facility: CLINIC | Age: 40
End: 2020-06-02
Payer: COMMERCIAL

## 2020-06-02 ENCOUNTER — OFFICE VISIT (OUTPATIENT)
Dept: ORTHOPEDICS | Facility: CLINIC | Age: 40
End: 2020-06-02
Payer: COMMERCIAL

## 2020-06-02 ENCOUNTER — PRE VISIT (OUTPATIENT)
Dept: SURGERY | Facility: CLINIC | Age: 40
End: 2020-06-02

## 2020-06-02 VITALS
SYSTOLIC BLOOD PRESSURE: 116 MMHG | HEIGHT: 68 IN | TEMPERATURE: 98.2 F | BODY MASS INDEX: 26.07 KG/M2 | OXYGEN SATURATION: 98 % | RESPIRATION RATE: 16 BRPM | WEIGHT: 172 LBS | DIASTOLIC BLOOD PRESSURE: 70 MMHG | HEART RATE: 91 BPM

## 2020-06-02 DIAGNOSIS — M54.16 LUMBAR RADICULOPATHY: Primary | ICD-10-CM

## 2020-06-02 DIAGNOSIS — Z11.59 ENCOUNTER FOR SCREENING FOR OTHER VIRAL DISEASES: ICD-10-CM

## 2020-06-02 DIAGNOSIS — Z01.818 PREOP EXAMINATION: Primary | ICD-10-CM

## 2020-06-02 PROBLEM — M25.50 PAIN IN JOINT: Status: ACTIVE | Noted: 2020-06-02

## 2020-06-02 PROBLEM — Z72.0 TOBACCO USER: Status: ACTIVE | Noted: 2020-06-01

## 2020-06-02 PROBLEM — S69.90XA INJURY, OTHER AND UNSPECIFIED, ELBOW, FOREARM, AND WRIST: Status: ACTIVE | Noted: 2020-06-01

## 2020-06-02 PROBLEM — M54.9 BACKACHE: Status: ACTIVE | Noted: 2020-06-02

## 2020-06-02 PROBLEM — S59.909A INJURY, OTHER AND UNSPECIFIED, ELBOW, FOREARM, AND WRIST: Status: ACTIVE | Noted: 2020-06-01

## 2020-06-02 PROBLEM — S59.919A INJURY, OTHER AND UNSPECIFIED, ELBOW, FOREARM, AND WRIST: Status: ACTIVE | Noted: 2020-06-01

## 2020-06-02 LAB
SARS-COV-2 RNA SPEC QL NAA+PROBE: NOT DETECTED
SPECIMEN SOURCE: NORMAL

## 2020-06-02 ASSESSMENT — PAIN SCALES - GENERAL: PAINLEVEL: SEVERE PAIN (6)

## 2020-06-02 ASSESSMENT — LIFESTYLE VARIABLES: TOBACCO_USE: 1

## 2020-06-02 ASSESSMENT — MIFFLIN-ST. JEOR: SCORE: 1654.69

## 2020-06-02 NOTE — ANESTHESIA PREPROCEDURE EVALUATION
"Anesthesia Pre-Procedure Evaluation    Patient: Ortega Burroughs   MRN:     [id number] Gender:   male   Age:    40 year old :      1980        Preoperative Diagnosis: * No surgery found *        LABS:  CBC:   Lab Results   Component Value Date    WBC 9.7 2020    WBC 13.5 (H) 2020    HGB 14.1 2020    HGB 16.2 2020    HCT 41.9 2020    HCT 48.7 2020     (L) 2020     2020     BMP:   Lab Results   Component Value Date     2020    POTASSIUM 4.6 2020    CHLORIDE 110 (H) 2020    CO2 21 2020    BUN 21 2020    CR 1.26 (H) 2020    GLC 88 2020     COAGS: No results found for: PTT, INR, FIBR  POC:   Lab Results   Component Value Date     (H) 2020     OTHER:   Lab Results   Component Value Date    MIHIR 9.8 2020        Preop Vitals    BP Readings from Last 3 Encounters:   20 114/68   20 116/78   20 115/66    Pulse Readings from Last 3 Encounters:   20 71   20 95   20 108      Resp Readings from Last 3 Encounters:   20 18   20 16   20 12    SpO2 Readings from Last 3 Encounters:   20 99%   20 98%   20 95%      Temp Readings from Last 1 Encounters:   20 97.9  F (36.6  C) (Oral)    Ht Readings from Last 1 Encounters:   20 1.727 m (5' 8\")      Wt Readings from Last 1 Encounters:   20 71.5 kg (157 lb 10.1 oz)    Estimated body mass index is 23.97 kg/m  as calculated from the following:    Height as of 3/9/20: 1.727 m (5' 8\").    Weight as of 3/9/20: 71.5 kg (157 lb 10.1 oz).     LDA:        Past Medical History:   Diagnosis Date     Anxiety      Arthritis       Past Surgical History:   Procedure Laterality Date     APPENDECTOMY       CHOLECYSTECTOMY  2009     DISCECTOMY LUMBAR POSTERIOR MICROSCOPIC ONE LEVEL Right 3/9/2020    Procedure: Right Lumbar 5 to Sacral 1 microdiscectomy;  Surgeon: Dmitri Waldrop " MD [place];  Location: [facility] OR     HERNIA REPAIR  2005     KNEE SURGERY      3 on left knee, 1 on right knee (arthroscopic)      Allergies   Allergen Reactions     Reglan [Metoclopramide] Itching        Anesthesia Evaluation     . Pt has had prior anesthetic.     History of anesthetic complications   - PONV        ROS/MED HX    ENT/Pulmonary:     (+)tobacco use, Current use , . .   (-) sleep apnea and DORIS risk factors   Neurologic:     (+)neuropathy - right posterior leg numbness and pain,     Cardiovascular:  - neg cardiovascular ROS   (+) ----. : . . . :. . Previous cardiac testing date:results:Stress Testdate:2010 results: date: results: date: results:          METS/Exercise Tolerance:  1 - Eating, dressing   Hematologic:  - neg hematologic  ROS       Musculoskeletal: Comment: Lumbar pain        GI/Hepatic:     (+) GERD Asymptomatic on medication,       Renal/Genitourinary:  - ROS Renal section negative       Endo:  - neg endo ROS       Psychiatric:     (+) psychiatric history anxiety      Infectious Disease:  - neg infectious disease ROS       Malignancy:      - no malignancy   Other:    (+) No chance of pregnancy H/O Chronic Pain,                       PHYSICAL EXAM:   Mental Status/Neuro: A/A/O; Age Appropriate   Airway: Facies: Feasible  Mallampati: II  Mouth/Opening: Full  TM distance: > 6 cm  Neck ROM: Full   Respiratory: Auscultation: CTAB     Resp. Rate: Normal     Resp. Effort: Normal      CV: Rhythm: Regular  Rate: Age appropriate  Heart: Normal Sounds  Edema: None   Comments:      Dental: Normal Dentition                Assessment:   ASA SCORE: 2    H&P: History and physical reviewed and following examination; no interval change.   Smoking Status:  Active Smoker   NPO Status: NPO Appropriate     Plan:   Anes. Type:  General   Pre-Medication: None   Induction:  IV (Standard)   Airway: ETT; Oral   Access/Monitoring: PIV   Maintenance: Balanced     Postop Plan:   Postop Pain: Opioids  Postop  Sedation/Airway: Not planned     PONV Management:   Adult Risk Factors:, Postop Opioids   Prevention: Ondansetron     CONSENT: Direct conversation   Plan and risks discussed with: Patient   Blood Products: Consented (ALL Blood Products)                PAC Discussion and Assessment    ASA Classification: 2  Case is suitable for: [place]  Anesthetic techniques and relevant risks discussed: GA  Invasive monitoring and risk discussed: No  Types:   Possibility and Risk of blood transfusion discussed: No  NPO instructions given:   Additional anesthetic preparation and risks discussed:   Needs early admission to pre-op area:   Other:     PAC Resident/NP Anesthesia Assessment:  Ortega Burroughs is a 40 year old male scheduled for Revision, L5-S1 Microdiskectomy on 6/4/20 by Dr. Waldrop in treatment of Lumbar disc herniation with radiculopathy .  PAC referral for risk assessment and optimization for anesthesia:    Pre-operative considerations:  1.  Cardiac:  Functional status- METS 1-2. Patient is limited by his back and right leg pain.  He denies any cardiopulmonary symptoms.   Low risk surgery with 0.4% (RCRI 0) risk of major adverse cardiac event. Denies chest pain, chest pressure, SOB, GARCÍA, palpitations.  -pt states [facility]  I do not have these records.  According to patient it was negative and his symptoms at the time were due to anxiety.       2.  Pulm:  Airway feasible.  DORIS risk: low  -current smoker, half pack per day     3.  GI:  Risk of PONV score = 2.  Pt endorses history of PONV after hernia surgery.   -GERD, asymptomatic on omeprazole. May take DOS.     4.  MSK:  -lumbar radiculopathy, right leg pain.  Procedure as above.   -may take Lyrica DOS, hold medical cannabis 24 hours prior to DOS.     5.  Psych:  -anxiety, seroquel at bedtime     VTE risk: 0.5%    Patient is optimized and is acceptable candidate for the proposed procedure.       **For further details of  assessment, testing, and physical exam please see H and P completed on same date.          Mariam Middleton PA-C, [facility]      Reviewed and Signed by PAC Mid-Level Provider/Resident  Mid-Level Provider/Resident: Mariam Middleton  Date: 6/2/2020  Time:     Attending Anesthesiologist Anesthesia Assessment:        Anesthesiologist:   Date:   Time:   Pass/Fail:   Disposition:     PAC Pharmacist Assessment:        Pharmacist:   Date:   Time:    Mariam Middleton PA-C

## 2020-06-02 NOTE — H&P
Pre-Operative H & P     CC:  Preoperative exam to assess for increased cardiopulmonary risk while undergoing surgery and anesthesia.    Date of Encounter: 6/2/2020  Primary Care Physician:  No Ref-Primary, Physician  Associated Diagnosis: lumbar disc herniation with radiculopathy    KRISTEN Burroughs is a 40 year old male who presents for pre-operative H & P in preparation for Revision Right Lumbar 5-Sacral 1 Microdiscectomy with Dr. Waldrop on 6/4/2020 at [facility]. General Anesthesia.    This is a 39-year-old male patient with a history of low back pain for many years.  His back pain is worse on the right.  [facility]  [facility] because [facility].  He wanted evaluation of his back pain and options for treatment.  He states that his significant low back pain radiates down the posterior right thigh to above the knee.  Standing makes the pain worse and he cannot find a particular position that makes his pain better.  He uses a cane to keep the weight off of his right leg.  Patient is currently taking Lyrica and medical cannabis for his pain.  The patient underwent L5-S1 microdiscectomy 3/9/2020 and initially did well and seemed to be improving.  Unfortunately, in early May his pain returned in the right leg, specifically his right buttock and right posterior thigh. He states he has numbness and tingling of the area.  He underwent ROXI at [facility] on 5/27/20 but states it only relieved his symptoms for a few hours while the lidocaine was working.  He is scheduled to see Dr. Waldrop today in clinic and is scheduled for the above procedure in two days.    History is obtained from the patient.     Past Medical History  Past Medical History:   Diagnosis Date     Anxiety      Arthritis      Lumbar radiculopathy        Past Surgical History  Past Surgical History:   Procedure Laterality Date      APPENDECTOMY       CHOLECYSTECTOMY  2009     DISCECTOMY LUMBAR POSTERIOR MICROSCOPIC ONE LEVEL Right 3/9/2020    Procedure: Right Lumbar 5 to Sacral 1 microdiscectomy;  Surgeon: Dmitri Waldrop MD;  Location: [facility] OR     HERNIA REPAIR  2005     KNEE SURGERY      3 on left knee, 1 on right knee (arthroscopic)       Hx of Blood transfusions/reactions: denies     Hx of abnormal bleeding or anti-platelet use: denies    Menstrual history: No LMP for male patient.:     Steroid use in the last year: denies    Personal or FH with difficulty with Anesthesia:  PONV    Prior to Admission Medications  Current Outpatient Medications   Medication Sig Dispense Refill     acetaminophen (TYLENOL) 325 MG tablet Take 2 tablets (650 mg) by mouth every 6 hours as needed for mild pain (Patient taking differently: Take 650 mg by mouth every 6 hours as needed for mild pain (PT last dose 5.31.2020) ) 50 tablet 0     Lidocaine (LIDOCARE) 4 % Patch Place 1 patch onto the skin every 24 hours To prevent lidocaine toxicity, patient should be patch free for 12 hrs daily.       medical cannabis (Patient's own supply) 1 Dose by Other route See Admin Instructions (The purpose of this order is to document that the patient reports taking medical cannabis.  This is not a prescription, and is not used to certify that the patient has a qualifying medical condition.)       omeprazole (PRILOSEC) 20 MG DR capsule Take 20 mg by mouth every morning       pregabalin (LYRICA) 100 MG capsule Take 100 mg by mouth 3 times daily        QUEtiapine (SEROQUEL) 50 MG tablet Take 50 mg by mouth At Bedtime         Allergies  Allergies   Allergen Reactions     Reglan [Metoclopramide] Itching       Social History  Social History     Socioeconomic History     Marital status: Single     Spouse name: Not on file     Number of children: Not on file     Years of education: Not on file     Highest education level: Not on file   Occupational History     Not on file    Social Needs     Financial resource strain: Not on file     Food insecurity     Worry: Not on file     Inability: Not on file     Transportation needs     Medical: Not on file     Non-medical: Not on file   Tobacco Use     Smoking status: Current Every Day Smoker     Packs/day: 0.50     Years: 25.00     Pack years: 12.50     Smokeless tobacco: Current User     Types: Chew   Substance and Sexual Activity     Alcohol use: Not Currently     Frequency: Monthly or less     Drug use: Yes     Types: Marijuana     Comment: edibles for pain and anxiety     Sexual activity: Not on file   Lifestyle     Physical activity     Days per week: Not on file     Minutes per session: Not on file     Stress: Not on file   Relationships     Social connections     Talks on phone: Not on file     Gets together: Not on file     Attends [employer] service: Not on file     Active member of club or organization: Not on file     Attends meetings of clubs or organizations: Not on file     Relationship status: Not on file     Intimate partner violence     Fear of current or ex partner: Not on file     Emotionally abused: Not on file     Physically abused: Not on file     Forced sexual activity: Not on file   Other Topics Concern     Parent/sibling w/ CABG, MI or angioplasty before 65F 55M? Not Asked   Social History Narrative     Not on file       Family History  Family History   Problem Relation Age of Onset     Chronic Obstructive Pulmonary Disease Mother      Hypertension Father            Anesthesia Evaluation     . Pt has had prior anesthetic.     History of anesthetic complications   - PONV        ROS/MED HX  The complete review of systems is negative other than noted in the HPI or here.   ENT/Pulmonary:     (+)tobacco use, Current use , . .   (-) sleep apnea and DORIS risk factors   Neurologic:     (+)neuropathy - right posterior leg numbness and pain,     Cardiovascular:  - neg cardiovascular ROS   (+) ----. : . . . :. . Previous cardiac  "testing date:results:Stress Testdate:2010 results: date: results: date: results:          METS/Exercise Tolerance:  1 - Eating, dressing   Hematologic:  - neg hematologic  ROS       Musculoskeletal: Comment: Lumbar pain        GI/Hepatic:     (+) GERD Asymptomatic on medication,       Renal/Genitourinary:  - ROS Renal section negative       Endo:  - neg endo ROS       Psychiatric:     (+) psychiatric history anxiety      Infectious Disease:  - neg infectious disease ROS       Malignancy:      - no malignancy   Other:    (+) No chance of pregnancy H/O Chronic Pain,           PHYSICAL EXAM:   Mental Status/Neuro: A/A/O; Age Appropriate   Airway: Facies: Feasible  Mallampati: I  Mouth/Opening: Full  TM distance: > 6 cm  Neck ROM: Full   Respiratory: Auscultation: CTAB     Resp. Rate: Normal     Resp. Effort: Normal      CV: Rhythm: Regular  Rate: Age appropriate  Heart: Normal Sounds  Edema: None   Comments:      Dental: Normal Dentition              Temp: 98.2  F (36.8  C) Temp src: Oral BP: 116/70 Pulse: 91   Resp: 16 SpO2: 98 %         172 lbs 0 oz  5' 8\"   Body mass index is 26.15 kg/m .       Physical Exam  Constitutional: Awake, alert, cooperative, no apparent distress, and appears stated age.  Eyes: Pupils equal, round and reactive to light, extra ocular muscles intact, sclera clear, conjunctiva normal.  HENT: Normocephalic, oral pharynx with moist mucus membranes, good dentition. No goiter appreciated.   Respiratory: Clear to auscultation bilaterally, no crackles or wheezing.  Cardiovascular: Regular rate and rhythm, normal S1 and S2, and no murmur noted.  Carotids +2, no bruits. No edema. Palpable pulses to radial  DP and PT arteries.   GI: Normal bowel sounds  Lymph/Hematologic: No cervical lymphadenopathy and no supraclavicular lymphadenopathy.  Genitourinary:  deferred  Skin: Warm and dry.  No rashes at anticipated surgical site.   Musculoskeletal: Full ROM of neck. There is no redness, warmth, or swelling " of the joints. Gross motor strength is normal.    Neurologic: Awake, alert, oriented to name, place and time. Cranial nerves II-XII are grossly intact.   Neuropsychiatric: Calm, cooperative. Normal affect.     Labs: (personally reviewed)  Component      Latest Ref Rng & Units 3/6/2020   Sodium      133 - 144 mmol/L 141   Potassium      3.4 - 5.3 mmol/L 4.6   Chloride      94 - 109 mmol/L 110 (H)   Carbon Dioxide      20 - 32 mmol/L 21   Anion Gap      3 - 14 mmol/L 10   Glucose      70 - 99 mg/dL 88   Urea Nitrogen      7 - 30 mg/dL 21   Creatinine      0.66 - 1.25 mg/dL 1.26 (H)   GFR Estimate      >60 mL/min/1.73:m2 70   GFR Estimate If Black      >60 mL/min/1.73:m2 81   Calcium      8.5 - 10.1 mg/dL 9.8     Component      Latest Ref Rng & Units 3/6/2020   WBC      4.0 - 11.0 10e9/L 13.5 (H)   RBC Count      4.4 - 5.9 10e12/L 5.33   Hemoglobin      13.3 - 17.7 g/dL 16.2   Hematocrit      40.0 - 53.0 % 48.7   MCV      78 - 100 fl 91   MCH      26.5 - 33.0 pg 30.4   MCHC      31.5 - 36.5 g/dL 33.3   RDW      10.0 - 15.0 % 12.4   Platelet Count      150 - 450 10e9/L 224         XR LUMBAR SACRAL TRANSFORAMINAL INJ RIGHT             5/26/2020 11:03AM        History:  L5-S1 degenerative changes. Request for a right L5-S1  transforaminal epidural steroid injection.      Procedure:  The risks (bleeding, infection, reaction to contrast and  medications) and benefits of the procedure were explained to the  patient and consent was obtained.  Using sterile technique and  fluoroscopic guidance a #22 gauge spinal needle was placed into the  right L5-S1 foramen using a posterior- lateral approach. 1.5 mL of  Isovue-M 200 contrast was injected confirming satisfactory position of  the needle tip.  20 mg of Dexamethasone mixed with 3 mL Lidocaine 1%  were injected.  Estimated blood loss during the procedure was less  than 5 mL. No specimens collected. No initial complication.   The  patient had no leg pain after approximately 5  minutes.      Fluoroscopy time: 0.3 minutes  Images Obtained: 5     The patient's pain levels (0-10 scale) were as follows:                          PRE INJECTION      Low back                4                                              Right leg                 3                                                Left leg                    0                                                   POST INJECTION   Low back               0   Right leg                0   Left leg                   0                                                                        Impression:  Technically successful transforaminal lumbar epidural  steroid injection with favorable initial pain relief. Long term  results pending.    MR LUMBAR SPINE W/O CONTRAST 5/12/2020 8:49 AM     Provided History: Back pain, prior surgery, new or progressive sx;  Lumbar radiculopathy     ICD-10: Lumbar radiculopathy     Comparison: Lumbar spine MRI 2/9/2020     Technique: Sagittal T1-weighted, sagittal STIR, 3D volumetric axial  and sagittal reconstructed T2-weighted images of the lumbar spine were  obtained without intravenous contrast.      Findings: Counting down from the C2, there are 5 lumbar-type vertebrae  with a hydrated disc between S1 and S2. Postsurgical changes of right  microdiscectomy and hemilaminectomy at L5-S1. The tip of the conus  medullaris is at L1.  Normal lumbar vertebral alignment.  There is  moderate disc height narrowing and disc dehydration at L5-S1.  Normal  marrow signal.     On a level by level basis:     T12-L1: No spinal canal or neuroforaminal stenosis.     L1-2: No spinal canal or neuroforaminal stenosis.     L2-3: No spinal canal or neuroforaminal stenosis.     L3-4: Mild facet arthropathy. No spinal canal or neuroforaminal  stenosis.     L4-5: Mild facet arthropathy. No spinal canal or neuroforaminal  stenosis.     L5-S1: Postsurgical changes of right hemilaminectomy and  microdiscectomy. There appears to be residual  mass effect from a  possible residual right subarticular to foraminal disc protrusion  versus early granulation tissue which posteriorly displaces and  possibly impinges on the descending right S1 nerve root. The spinal  canal is patent. There is mild narrowing of the right neural foramen.     Paraspinous tissues are within normal limits.                                                                      Impression:   1. Postsurgical changes of right hemilaminectomy and microdiscectomy  at L5-S1 with possible residual right subarticular to foraminal disc  protrusion versus early granulation tissue with persistent posterior  displacement and possible impingement of the descending right S1 nerve  root in the lateral recess.  2. Mild lumbar degenerative disease without significant spinal canal  or neural foramina narrowing.      Outside records reviewed from: n/a    ASSESSMENT and PLAN  Ortega Burroughs is a 40 year old male scheduled for Revision, L5-S1 Microdiskectomy on 6/4/20 by Dr. Waldrop in treatment of Lumbar disc herniation with radiculopathy .  PAC referral for risk assessment and optimization for anesthesia:    Pre-operative considerations:  1.  Cardiac:  Functional status- METS 1-2. Patient is limited by his back and right leg pain.  He denies any cardiopulmonary symptoms.   Low risk surgery with 0.4% (RCRI 0) risk of major adverse cardiac event. Denies chest pain, chest pressure, SOB, GARCÍA, palpitations.  -pt states [facility]  I do not have these records.  According to patient it was negative and his symptoms at the time were due to anxiety.       2.  Pulm:  Airway feasible.  DORIS risk: low  -current smoker, half pack per day     3.  GI:  Risk of PONV score = 2.  Pt endorses history of PONV after hernia surgery.   -GERD, asymptomatic on omeprazole. May take DOS.     4.  MSK:  -lumbar radiculopathy, right leg pain.  Procedure as above.   -may take Lyrica DOS, hold  medical cannabis 24 hours prior to DOS.     5.  Psych:  -anxiety, seroquel at bedtime     VTE risk: 0.5%    Patient is optimized and is acceptable candidate for the proposed procedure.           Mariam Middleton PA-C  Preoperative Assessment Center  [facility]  [facility]  Phone: [phone]  Fax: [phone]

## 2020-06-02 NOTE — LETTER
6/2/2020         RE: Ortega Burroughs  [address] [place] [address]        Dear Colleague,    Thank you for referring your patient, Ortega Burroughs, to the [facility]. Please see a copy of my visit note below.    Spine Surgery Return Clinic Visit      Chief Complaint:   RECHECK (PAC review discuss surgery )      Interval HPI:  Symptom Profile Including: location of symptoms, onset, severity, exacerbating/alleviating factors, previous treatments:        Ortega Burroughs is a 40 year old male who returns today status post previous right-sided L5-S1 microdiscectomy.  He did extremely well for about 6 weeks after that surgery.  Starting about 7 weeks after the operation he developed a recurrence of right S1 distribution radiculopathy with severe pain in his buttocks and numbness radiating down onto the plantar aspect and lateral aspect of his right foot.  It is particularly severe with any activity.  Improved somewhat with rest.  In order to treat his recurrent symptoms we recommended a series of stretching and exercises, a right L5-S1 transforaminal epidural injection, a steroid Dosepak and gabapentin.  He notes that the injection made the leg numb and buttock numb for a few hours and he felt great after it but then all of the symptoms returned.  He is continued to be very severely disabled and now cannot sleep and can barely walk and is having to use a cane because the leg is so painful.  As a result of this we have discussed doing a revision microdiscectomy.  He returns today for further imaging review, physical exam and surgery preparation.            Past Medical History:     Past Medical History:   Diagnosis Date     Anxiety      Arthritis      Lumbar radiculopathy             Past Surgical History:     Past Surgical History:   Procedure Laterality Date     APPENDECTOMY       CHOLECYSTECTOMY  2009     DISCECTOMY LUMBAR POSTERIOR MICROSCOPIC ONE LEVEL Right 3/9/2020     Procedure: Right Lumbar 5 to Sacral 1 microdiscectomy;  Surgeon: Dmitri Waldrop MD;  Location: [facility] OR     HERNIA REPAIR  2005     KNEE SURGERY      3 on left knee, 1 on right knee (arthroscopic)            Social History:     Social History     Tobacco Use     Smoking status: Current Every Day Smoker     Packs/day: 0.50     Years: 25.00     Pack years: 12.50     Smokeless tobacco: Current User     Types: Chew   Substance Use Topics     Alcohol use: Not Currently     Frequency: Monthly or less            Family History:     Family History   Problem Relation Age of Onset     Chronic Obstructive Pulmonary Disease Mother      Hypertension Father             Allergies:     Allergies   Allergen Reactions     Reglan [Metoclopramide] Itching            Medications:     Current Outpatient Medications   Medication     acetaminophen (TYLENOL) 325 MG tablet     Lidocaine (LIDOCARE) 4 % Patch     medical cannabis (Patient's own supply)     omeprazole (PRILOSEC) 20 MG DR capsule     pregabalin (LYRICA) 100 MG capsule     QUEtiapine (SEROQUEL) 50 MG tablet     No current facility-administered medications for this visit.              Review of Systems:   A focused musculoskeletal and neurologic ROS was performed with pertinent positives and negatives noted in the HPI.  Additional systems were also reviewed and are documented at the bottom of the note.         Physical Exam:   Vitals: There were no vitals taken for this visit.  Musculoskeletal, Neurologic, and Spine:          Lumbar Spine:    Appearance - No gross stepoffs or deformities    Motor -     L2-3: Hip flexion 5/5 R and 5/5 L strength          L3/4:  Knee extension R 5/5 and L 5/5 strength         L4/5:  Foot dorsiflexion R 5/5 L 5/5 and       EHL dorsiflexion R 4/5 L 4/5 strength         S1:  Plantarflexion/Peroneal Muscles  R 4/5 and L 5/5 strength    Sensation: intact to light touch L3-S1 distribution BLE, diminished right S1.      Neurologic:      REFLEXES  Left Right                  Patella 1+ 1+   Ankle jerk 1+ 1+   Babinski No upgoing great toe No upgoing great toe   Clonus 0 beats 0 beats     Hip Exam:  No pain with hip log roll and no tenderness over the greater trochanters.    Alignment:  Patient stands with a neutral standing sagittal balance.    Positive right straight leg raise         Imaging:   We ordered and independently reviewed new radiographs at this clinic visit. The results were discussed with the patient. Findings include:     I again reviewed his May 16, 2020 lumbar MRI which shows a recurrent right L5-S1 disc herniation with displacement of the traversing S1 nerve root     Assessment and Plan:     40 year old male with right S1 distribution radiculopathy and recurrent L5-S1 disc herniation.    He and I again discussed options.  I think he is exhausted conservative management with oral medications injections and an exercise program and is very disabled by the pain.  Therefore surgery is indicated.  The options would be a revision microdiscectomy or a fusion.  I told him I recommended the microdiscectomy since it is a smaller surgery.  He understands that the disc with a herniated third time or if he develops future instability or severe spondylosis he might need a fusion in the future.    Risks of this surgery include risk of infection, risk of dural tear resulting in CSF leak which might result in headaches, or possible need for lumbar drain, or possible revision surgery in the setting of a persistent leak. Risk of seroma or hematoma requiring revision surgery. Possible nerve root injury resulting in numbness weakness or paralysis into the leg. Possible radiculitis which could result in similar symptoms or could result in significant neurogenic type pain into the leg. Risk of incomplete decompression which might require revision surgery in the future.  Risk of pars fracture or postoperative instability requiring conversion to a fusion in the  future. Risk of adjacent segment problems requiring surgery in the future. Risk of incomplete relief of symptoms possibly requiring revision surgery in the future. There is a risk of blood clots in the legs or the lungs.  Furthermore, although rare, there are risks of major vessel or major organ injury from the surgery, and risks of the anesthetic including stroke heart attack and death.    Unfortunately he is a  of the recovery and I counseled him that the recovery would likely be similar.  The main difference is that he is at a higher risk for spinal fluid leak or nerve injury because of scarring from the previous surgery.  He understands and wishes to proceed.           Respectfully,  Dmitri Waldrop MD  Spine Surgery  [facility]    Answers for HPI/ROS submitted by the patient on 6/1/2020   General Symptoms: No  Skin Symptoms: No  HENT Symptoms: No  EYE SYMPTOMS: No  HEART SYMPTOMS: No  LUNG SYMPTOMS: No  INTESTINAL SYMPTOMS: No  URINARY SYMPTOMS: No  REPRODUCTIVE SYMPTOMS: No  SKELETAL SYMPTOMS: Yes  BLOOD SYMPTOMS: No  NERVOUS SYSTEM SYMPTOMS: No  MENTAL HEALTH SYMPTOMS: Yes  Back pain: Yes  Muscle aches: Yes  Neck pain: No  Swollen joints: No  Joint pain: Yes  Bone pain: No  Muscle cramps: Yes  Muscle weakness: Yes  Joint stiffness: Yes  Bone fracture: No  Nervous or Anxious: Yes  Depression: No  Trouble sleeping: No  Trouble thinking or concentrating: No  Mood changes: No  Panic attacks: No      Again, thank you for allowing me to participate in the care of your patient.        Sincerely,        Dmitri Waldrop MD

## 2020-06-02 NOTE — PATIENT INSTRUCTIONS
Preparing for Your Surgery      Name:  Ortega Burroughs   MRN:  [id number]   :  1980   Today's Date:  2020     Arriving for surgery:  Surgery date:  2020  Arrival time:  8AM  Due to the COVID 19 crisis, we are trying to keep our patients safe from others who might have respiratory illnesses so the hospital is implementing a no visitor policy.  Also, at this time  parking is not available.  Please come to:     [facility] [address]  [address] [place], [place] [id number]. [place]  [address]    - parking is NOT available     -Proceed to the 3rd floor, check in at the Adult Surgery Waiting Lounge. [phone]    If an escort is needed stop at the Information Desk in the lobby. Inform the information person that you are here for surgery. An escort to the Adult Surgery Waiting Lounge will be provided.        What can I eat or drink?  -  You may have solid food or milk products until 8 hours prior to your surgery.  2020, 2:30AM  -  You may have water, apple juice or 7up/Sprite until 2 hours prior to your surgery. 2020, 8AM    Which medicines can I take?  Hold Aspirin, vitamins and supplements one week prior to surgery.  Hold Ibuprofen for 24 hours and/or Naproxen for 48 hours prior to surgery.   Hold Medical Cannabis for 24 hours prior to surgery.     -  Please take these medications the day of surgery:    Acetaminophen(Tylenol) as needed    Omeprazole(Prilosec)  Pregabalin(Lyrica)  How do I prepare myself?  -  Take two showers: one the night before surgery; and one the morning of surgery.         Use Scrubcare or Hibiclens to wash from neck down, leave soap on your skin for up to one minute.  Do not get soap in your eyes or ears.  You may use your own shampoo and conditioner; no other hair products.   -  Do NOT use lotion, powder, deodorant, or antiperspirant the day of your surgery.  -  Do NOT wear jewelry.  - Do not bring your own medications to the hospital, except for  inhalers and eye   drops.  -  Bring your ID and insurance card.    Questions or Concerns:  -If you are scheduled on the [facility] or [facility] campus and have questions or concerns regarding the day of surgery, please call [facility] at [phone].     -If you have health changes between today and your surgery please call your surgeon. For questions after surgery please call your surgeons office.           AFTER YOUR SURGERY  Breathing exercises   Breathing exercises help you recover faster. Take deep breaths and let the air out slowly. This will:     Help you wake up after surgery.    Help prevent complications like pneumonia.  Preventing complications will help you go home sooner.   We may give you a breathing device (incentive spirometer) to encourage you to breathe deeply.   Nausea and vomiting   You may feel sick to your stomach after surgery; if so, let your nurse know.    Pain control:  After surgery, you may have pain. Our goal is to help you manage your pain. Pain medicine will help you feel comfortable enough to do activities that will help you heal.  These activities may include breathing exercises, walking and physical therapy.   To help your health care team treat your pain we will ask: 1) If you have pain  2) where it is located 3) describe your pain in your words  Methods of pain control include medications given by mouth, vein or by nerve block for some surgeries.  Sequential Compression Device (SCD):  You may need to wear SCD S (also called pneumo boots)on your legs or feet. These are wraps connected to a machine that pumps in air and releases it. The repeated pumping helps prevent blood clots from forming.

## 2020-06-02 NOTE — PROGRESS NOTES
Spine Surgery Return Clinic Visit      Chief Complaint:   RECHECK (PAC review discuss surgery )      Interval HPI:  Symptom Profile Including: location of symptoms, onset, severity, exacerbating/alleviating factors, previous treatments:        Ortega Burroughs is a 40 year old male who returns today status post previous right-sided L5-S1 microdiscectomy.  He did extremely well for about 6 weeks after that surgery.  Starting about 7 weeks after the operation he developed a recurrence of right S1 distribution radiculopathy with severe pain in his buttocks and numbness radiating down onto the plantar aspect and lateral aspect of his right foot.  It is particularly severe with any activity.  Improved somewhat with rest.  In order to treat his recurrent symptoms we recommended a series of stretching and exercises, a right L5-S1 transforaminal epidural injection, a steroid Dosepak and gabapentin.  He notes that the injection made the leg numb and buttock numb for a few hours and he felt great after it but then all of the symptoms returned.  He is continued to be very severely disabled and now cannot sleep and can barely walk and is having to use a cane because the leg is so painful.  As a result of this we have discussed doing a revision microdiscectomy.  He returns today for further imaging review, physical exam and surgery preparation.            Past Medical History:     Past Medical History:   Diagnosis Date     Anxiety      Arthritis      Lumbar radiculopathy             Past Surgical History:     Past Surgical History:   Procedure Laterality Date     APPENDECTOMY       CHOLECYSTECTOMY  2009     DISCECTOMY LUMBAR POSTERIOR MICROSCOPIC ONE LEVEL Right 3/9/2020    Procedure: Right Lumbar 5 to Sacral 1 microdiscectomy;  Surgeon: Dmitri Waldrop MD;  Location: [facility] OR     HERNIA REPAIR  2005     KNEE SURGERY      3 on left knee, 1 on right knee (arthroscopic)            Social History:     Social History      Tobacco Use     Smoking status: Current Every Day Smoker     Packs/day: 0.50     Years: 25.00     Pack years: 12.50     Smokeless tobacco: Current User     Types: Chew   Substance Use Topics     Alcohol use: Not Currently     Frequency: Monthly or less            Family History:     Family History   Problem Relation Age of Onset     Chronic Obstructive Pulmonary Disease Mother      Hypertension Father             Allergies:     Allergies   Allergen Reactions     Reglan [Metoclopramide] Itching            Medications:     Current Outpatient Medications   Medication     acetaminophen (TYLENOL) 325 MG tablet     Lidocaine (LIDOCARE) 4 % Patch     medical cannabis (Patient's own supply)     omeprazole (PRILOSEC) 20 MG DR capsule     pregabalin (LYRICA) 100 MG capsule     QUEtiapine (SEROQUEL) 50 MG tablet     No current facility-administered medications for this visit.              Review of Systems:   A focused musculoskeletal and neurologic ROS was performed with pertinent positives and negatives noted in the HPI.  Additional systems were also reviewed and are documented at the bottom of the note.         Physical Exam:   Vitals: There were no vitals taken for this visit.  Musculoskeletal, Neurologic, and Spine:          Lumbar Spine:    Appearance - No gross stepoffs or deformities    Motor -     L2-3: Hip flexion 5/5 R and 5/5 L strength          L3/4:  Knee extension R 5/5 and L 5/5 strength         L4/5:  Foot dorsiflexion R 5/5 L 5/5 and       EHL dorsiflexion R 4/5 L 4/5 strength         S1:  Plantarflexion/Peroneal Muscles  R 4/5 and L 5/5 strength    Sensation: intact to light touch L3-S1 distribution BLE, diminished right S1.      Neurologic:      REFLEXES Left Right                  Patella 1+ 1+   Ankle jerk 1+ 1+   Babinski No upgoing great toe No upgoing great toe   Clonus 0 beats 0 beats     Hip Exam:  No pain with hip log roll and no tenderness over the greater trochanters.    Alignment:  Patient  stands with a neutral standing sagittal balance.    Positive right straight leg raise         Imaging:   We ordered and independently reviewed new radiographs at this clinic visit. The results were discussed with the patient. Findings include:     I again reviewed his May 16, 2020 lumbar MRI which shows a recurrent right L5-S1 disc herniation with displacement of the traversing S1 nerve root     Assessment and Plan:     40 year old male with right S1 distribution radiculopathy and recurrent L5-S1 disc herniation.    He and I again discussed options.  I think he is exhausted conservative management with oral medications injections and an exercise program and is very disabled by the pain.  Therefore surgery is indicated.  The options would be a revision microdiscectomy or a fusion.  I told him I recommended the microdiscectomy since it is a smaller surgery.  He understands that the disc with a herniated third time or if he develops future instability or severe spondylosis he might need a fusion in the future.    Risks of this surgery include risk of infection, risk of dural tear resulting in CSF leak which might result in headaches, or possible need for lumbar drain, or possible revision surgery in the setting of a persistent leak. Risk of seroma or hematoma requiring revision surgery. Possible nerve root injury resulting in numbness weakness or paralysis into the leg. Possible radiculitis which could result in similar symptoms or could result in significant neurogenic type pain into the leg. Risk of incomplete decompression which might require revision surgery in the future.  Risk of pars fracture or postoperative instability requiring conversion to a fusion in the future. Risk of adjacent segment problems requiring surgery in the future. Risk of incomplete relief of symptoms possibly requiring revision surgery in the future. There is a risk of blood clots in the legs or the lungs.  Furthermore, although rare, there  are risks of major vessel or major organ injury from the surgery, and risks of the anesthetic including stroke heart attack and death.    Unfortunately he is a  of the recovery and I counseled him that the recovery would likely be similar.  The main difference is that he is at a higher risk for spinal fluid leak or nerve injury because of scarring from the previous surgery.  He understands and wishes to proceed.           Respectfully,  Dmitri Waldrop MD  Spine Surgery  [facility]    Answers for HPI/ROS submitted by the patient on 6/1/2020   General Symptoms: No  Skin Symptoms: No  HENT Symptoms: No  EYE SYMPTOMS: No  HEART SYMPTOMS: No  LUNG SYMPTOMS: No  INTESTINAL SYMPTOMS: No  URINARY SYMPTOMS: No  REPRODUCTIVE SYMPTOMS: No  SKELETAL SYMPTOMS: Yes  BLOOD SYMPTOMS: No  NERVOUS SYSTEM SYMPTOMS: No  MENTAL HEALTH SYMPTOMS: Yes  Back pain: Yes  Muscle aches: Yes  Neck pain: No  Swollen joints: No  Joint pain: Yes  Bone pain: No  Muscle cramps: Yes  Muscle weakness: Yes  Joint stiffness: Yes  Bone fracture: No  Nervous or Anxious: Yes  Depression: No  Trouble sleeping: No  Trouble thinking or concentrating: No  Mood changes: No  Panic attacks: No

## 2020-06-02 NOTE — NURSING NOTE
Reason For Visit:   Chief Complaint   Patient presents with     RECHECK     PAC review discuss surgery        There were no vitals taken for this visit.         Arron Chopra ATC

## 2020-06-04 ENCOUNTER — ANESTHESIA (OUTPATIENT)
Dept: SURGERY | Facility: CLINIC | Age: 40
End: 2020-06-04
Payer: COMMERCIAL

## 2020-06-04 ENCOUNTER — HOSPITAL ENCOUNTER (OUTPATIENT)
Facility: CLINIC | Age: 40
Discharge: HOME | End: 2020-06-04
Attending: ORTHOPAEDIC SURGERY | Admitting: ORTHOPAEDIC SURGERY
Payer: COMMERCIAL

## 2020-06-04 ENCOUNTER — APPOINTMENT (OUTPATIENT)
Dept: GENERAL RADIOLOGY | Facility: CLINIC | Age: 40
End: 2020-06-04
Attending: ORTHOPAEDIC SURGERY
Payer: COMMERCIAL

## 2020-06-04 VITALS
SYSTOLIC BLOOD PRESSURE: 134 MMHG | WEIGHT: 162.04 LBS | RESPIRATION RATE: 16 BRPM | OXYGEN SATURATION: 98 % | HEIGHT: 68 IN | TEMPERATURE: 98.1 F | BODY MASS INDEX: 24.56 KG/M2 | HEART RATE: 94 BPM | DIASTOLIC BLOOD PRESSURE: 84 MMHG

## 2020-06-04 DIAGNOSIS — M51.16 LUMBAR DISC HERNIATION WITH RADICULOPATHY: ICD-10-CM

## 2020-06-04 LAB
GLUCOSE SERPL-MCNC: 84 MG/DL (ref 70–99)
HGB BLD-MCNC: 15 G/DL (ref 13.3–17.7)

## 2020-06-04 PROCEDURE — 37000009 ZZH ANESTHESIA TECHNICAL FEE, EACH ADDTL 15 MIN: Performed by: ORTHOPAEDIC SURGERY

## 2020-06-04 PROCEDURE — 25000128 H RX IP 250 OP 636: Performed by: ORTHOPAEDIC SURGERY

## 2020-06-04 PROCEDURE — 36000066 ZZH SURGERY LEVEL 4 W FLUORO 1ST 30 MIN - UMMC: Performed by: ORTHOPAEDIC SURGERY

## 2020-06-04 PROCEDURE — 37000008 ZZH ANESTHESIA TECHNICAL FEE, 1ST 30 MIN: Performed by: ORTHOPAEDIC SURGERY

## 2020-06-04 PROCEDURE — 36000064 ZZH SURGERY LEVEL 4 EA 15 ADDTL MIN - UMMC: Performed by: ORTHOPAEDIC SURGERY

## 2020-06-04 PROCEDURE — 25000128 H RX IP 250 OP 636: Performed by: NURSE ANESTHETIST, CERTIFIED REGISTERED

## 2020-06-04 PROCEDURE — 25800030 ZZH RX IP 258 OP 636: Performed by: NURSE ANESTHETIST, CERTIFIED REGISTERED

## 2020-06-04 PROCEDURE — 85018 HEMOGLOBIN: CPT | Performed by: ANESTHESIOLOGY

## 2020-06-04 PROCEDURE — 25000132 ZZH RX MED GY IP 250 OP 250 PS 637: Performed by: ANESTHESIOLOGY

## 2020-06-04 PROCEDURE — 25000128 H RX IP 250 OP 636: Performed by: ANESTHESIOLOGY

## 2020-06-04 PROCEDURE — 25000132 ZZH RX MED GY IP 250 OP 250 PS 637: Performed by: PHYSICIAN ASSISTANT

## 2020-06-04 PROCEDURE — 25000125 ZZHC RX 250: Performed by: ORTHOPAEDIC SURGERY

## 2020-06-04 PROCEDURE — 25000301 ZZH OR RX SURGIFLO W/THROMBIN KIT 2ML 1991 OPNP: Performed by: ORTHOPAEDIC SURGERY

## 2020-06-04 PROCEDURE — 40000985 XR LUMBAR SPINE PORT 1 VW

## 2020-06-04 PROCEDURE — 40000170 ZZH STATISTIC PRE-PROCEDURE ASSESSMENT II: Performed by: ORTHOPAEDIC SURGERY

## 2020-06-04 PROCEDURE — 82947 ASSAY GLUCOSE BLOOD QUANT: CPT | Performed by: ANESTHESIOLOGY

## 2020-06-04 PROCEDURE — 25000125 ZZHC RX 250: Performed by: NURSE ANESTHETIST, CERTIFIED REGISTERED

## 2020-06-04 PROCEDURE — 27210995 ZZH RX 272: Performed by: ORTHOPAEDIC SURGERY

## 2020-06-04 PROCEDURE — 36415 COLL VENOUS BLD VENIPUNCTURE: CPT | Performed by: ANESTHESIOLOGY

## 2020-06-04 PROCEDURE — 71000014 ZZH RECOVERY PHASE 1 LEVEL 2 FIRST HR: Performed by: ORTHOPAEDIC SURGERY

## 2020-06-04 PROCEDURE — 71000027 ZZH RECOVERY PHASE 2 EACH 15 MINS: Performed by: ORTHOPAEDIC SURGERY

## 2020-06-04 PROCEDURE — 25000566 ZZH SEVOFLURANE, EA 15 MIN: Performed by: ORTHOPAEDIC SURGERY

## 2020-06-04 PROCEDURE — 27210794 ZZH OR GENERAL SUPPLY STERILE: Performed by: ORTHOPAEDIC SURGERY

## 2020-06-04 RX ORDER — LIDOCAINE HYDROCHLORIDE 20 MG/ML
INJECTION, SOLUTION INFILTRATION; PERINEURAL PRN
Status: DISCONTINUED | OUTPATIENT
Start: 2020-06-04 | End: 2020-06-04

## 2020-06-04 RX ORDER — ACETAMINOPHEN 325 MG/1
975 TABLET ORAL ONCE
Status: COMPLETED | OUTPATIENT
Start: 2020-06-04 | End: 2020-06-04

## 2020-06-04 RX ORDER — HYDROMORPHONE HYDROCHLORIDE 1 MG/ML
0.5 INJECTION, SOLUTION INTRAMUSCULAR; INTRAVENOUS; SUBCUTANEOUS ONCE
Status: COMPLETED | OUTPATIENT
Start: 2020-06-04 | End: 2020-06-04

## 2020-06-04 RX ORDER — LIDOCAINE 40 MG/G
CREAM TOPICAL
Status: DISCONTINUED | OUTPATIENT
Start: 2020-06-04 | End: 2020-06-04 | Stop reason: HOSPADM

## 2020-06-04 RX ORDER — DEXAMETHASONE SODIUM PHOSPHATE 4 MG/ML
INJECTION, SOLUTION INTRA-ARTICULAR; INTRALESIONAL; INTRAMUSCULAR; INTRAVENOUS; SOFT TISSUE PRN
Status: DISCONTINUED | OUTPATIENT
Start: 2020-06-04 | End: 2020-06-04

## 2020-06-04 RX ORDER — DOCUSATE SODIUM 100 MG/1
100 CAPSULE, LIQUID FILLED ORAL 2 TIMES DAILY
Qty: 20 CAPSULE | Refills: 0 | Status: SHIPPED | OUTPATIENT
Start: 2020-06-04 | End: 2020-06-09

## 2020-06-04 RX ORDER — ONDANSETRON 4 MG/1
4-8 TABLET, ORALLY DISINTEGRATING ORAL EVERY 8 HOURS PRN
Qty: 20 TABLET | Refills: 0 | Status: ON HOLD | OUTPATIENT
Start: 2020-06-04 | End: 2020-06-26

## 2020-06-04 RX ORDER — SODIUM CHLORIDE, SODIUM LACTATE, POTASSIUM CHLORIDE, CALCIUM CHLORIDE 600; 310; 30; 20 MG/100ML; MG/100ML; MG/100ML; MG/100ML
INJECTION, SOLUTION INTRAVENOUS CONTINUOUS
Status: DISCONTINUED | OUTPATIENT
Start: 2020-06-04 | End: 2020-06-04 | Stop reason: HOSPADM

## 2020-06-04 RX ORDER — FENTANYL CITRATE 50 UG/ML
25-50 INJECTION, SOLUTION INTRAMUSCULAR; INTRAVENOUS
Status: DISCONTINUED | OUTPATIENT
Start: 2020-06-04 | End: 2020-06-04 | Stop reason: HOSPADM

## 2020-06-04 RX ORDER — LORAZEPAM 2 MG/ML
.5-1 INJECTION INTRAMUSCULAR
Status: COMPLETED | OUTPATIENT
Start: 2020-06-04 | End: 2020-06-04

## 2020-06-04 RX ORDER — AMOXICILLIN 250 MG
1-2 CAPSULE ORAL 2 TIMES DAILY
Qty: 30 TABLET | Refills: 0 | Status: SHIPPED | OUTPATIENT
Start: 2020-06-04 | End: 2020-06-15

## 2020-06-04 RX ORDER — ONDANSETRON 4 MG/1
4 TABLET, ORALLY DISINTEGRATING ORAL EVERY 30 MIN PRN
Status: DISCONTINUED | OUTPATIENT
Start: 2020-06-04 | End: 2020-06-04 | Stop reason: HOSPADM

## 2020-06-04 RX ORDER — HYDROCODONE BITARTRATE AND ACETAMINOPHEN 5; 325 MG/1; MG/1
1-2 TABLET ORAL EVERY 4 HOURS PRN
Qty: 50 TABLET | Refills: 0 | Status: SHIPPED | OUTPATIENT
Start: 2020-06-04 | End: 2020-06-09

## 2020-06-04 RX ORDER — VANCOMYCIN HYDROCHLORIDE 500 MG/10ML
INJECTION, POWDER, LYOPHILIZED, FOR SOLUTION INTRAVENOUS PRN
Status: DISCONTINUED | OUTPATIENT
Start: 2020-06-04 | End: 2020-06-04 | Stop reason: HOSPADM

## 2020-06-04 RX ORDER — ONDANSETRON 2 MG/ML
4 INJECTION INTRAMUSCULAR; INTRAVENOUS EVERY 30 MIN PRN
Status: DISCONTINUED | OUTPATIENT
Start: 2020-06-04 | End: 2020-06-04 | Stop reason: HOSPADM

## 2020-06-04 RX ORDER — HYDROXYZINE HYDROCHLORIDE 25 MG/1
25 TABLET, FILM COATED ORAL
Status: DISCONTINUED | OUTPATIENT
Start: 2020-06-04 | End: 2020-06-04 | Stop reason: HOSPADM

## 2020-06-04 RX ORDER — BUPIVACAINE HYDROCHLORIDE AND EPINEPHRINE 2.5; 5 MG/ML; UG/ML
INJECTION, SOLUTION INFILTRATION; PERINEURAL PRN
Status: DISCONTINUED | OUTPATIENT
Start: 2020-06-04 | End: 2020-06-04 | Stop reason: HOSPADM

## 2020-06-04 RX ORDER — TRIAMCINOLONE ACETONIDE 40 MG/ML
INJECTION, SUSPENSION INTRA-ARTICULAR; INTRAMUSCULAR PRN
Status: DISCONTINUED | OUTPATIENT
Start: 2020-06-04 | End: 2020-06-04 | Stop reason: HOSPADM

## 2020-06-04 RX ORDER — POLYETHYLENE GLYCOL 3350 17 G/17G
1 POWDER, FOR SOLUTION ORAL DAILY
Qty: 10 PACKET | Refills: 0 | Status: ON HOLD | OUTPATIENT
Start: 2020-06-04 | End: 2020-06-29

## 2020-06-04 RX ORDER — ONDANSETRON 4 MG/1
4 TABLET, ORALLY DISINTEGRATING ORAL
Status: DISCONTINUED | OUTPATIENT
Start: 2020-06-04 | End: 2020-06-04 | Stop reason: HOSPADM

## 2020-06-04 RX ORDER — DIAZEPAM 5 MG
5 TABLET ORAL EVERY 6 HOURS PRN
Qty: 20 TABLET | Refills: 0 | Status: SHIPPED | OUTPATIENT
Start: 2020-06-04 | End: 2020-06-09

## 2020-06-04 RX ORDER — HYDROMORPHONE HYDROCHLORIDE 1 MG/ML
.3-.5 INJECTION, SOLUTION INTRAMUSCULAR; INTRAVENOUS; SUBCUTANEOUS EVERY 10 MIN PRN
Status: DISCONTINUED | OUTPATIENT
Start: 2020-06-04 | End: 2020-06-04 | Stop reason: HOSPADM

## 2020-06-04 RX ORDER — ONDANSETRON 2 MG/ML
INJECTION INTRAMUSCULAR; INTRAVENOUS PRN
Status: DISCONTINUED | OUTPATIENT
Start: 2020-06-04 | End: 2020-06-04

## 2020-06-04 RX ORDER — PROPOFOL 10 MG/ML
INJECTION, EMULSION INTRAVENOUS PRN
Status: DISCONTINUED | OUTPATIENT
Start: 2020-06-04 | End: 2020-06-04

## 2020-06-04 RX ORDER — CEFAZOLIN SODIUM 1 G/3ML
1 INJECTION, POWDER, FOR SOLUTION INTRAMUSCULAR; INTRAVENOUS SEE ADMIN INSTRUCTIONS
Status: DISCONTINUED | OUTPATIENT
Start: 2020-06-04 | End: 2020-06-04 | Stop reason: HOSPADM

## 2020-06-04 RX ORDER — HYDROCODONE BITARTRATE AND ACETAMINOPHEN 5; 325 MG/1; MG/1
2 TABLET ORAL
Status: DISCONTINUED | OUTPATIENT
Start: 2020-06-04 | End: 2020-06-04 | Stop reason: HOSPADM

## 2020-06-04 RX ORDER — METOPROLOL TARTRATE 1 MG/ML
1-2 INJECTION, SOLUTION INTRAVENOUS EVERY 5 MIN PRN
Status: DISCONTINUED | OUTPATIENT
Start: 2020-06-04 | End: 2020-06-04 | Stop reason: HOSPADM

## 2020-06-04 RX ORDER — NALOXONE HYDROCHLORIDE 0.4 MG/ML
.1-.4 INJECTION, SOLUTION INTRAMUSCULAR; INTRAVENOUS; SUBCUTANEOUS
Status: DISCONTINUED | OUTPATIENT
Start: 2020-06-04 | End: 2020-06-04 | Stop reason: HOSPADM

## 2020-06-04 RX ORDER — FENTANYL CITRATE 50 UG/ML
INJECTION, SOLUTION INTRAMUSCULAR; INTRAVENOUS PRN
Status: DISCONTINUED | OUTPATIENT
Start: 2020-06-04 | End: 2020-06-04

## 2020-06-04 RX ORDER — OXYCODONE HYDROCHLORIDE 5 MG/1
5 TABLET ORAL EVERY 4 HOURS PRN
Status: DISCONTINUED | OUTPATIENT
Start: 2020-06-04 | End: 2020-06-04 | Stop reason: HOSPADM

## 2020-06-04 RX ORDER — SODIUM CHLORIDE, SODIUM LACTATE, POTASSIUM CHLORIDE, CALCIUM CHLORIDE 600; 310; 30; 20 MG/100ML; MG/100ML; MG/100ML; MG/100ML
INJECTION, SOLUTION INTRAVENOUS CONTINUOUS PRN
Status: DISCONTINUED | OUTPATIENT
Start: 2020-06-04 | End: 2020-06-04

## 2020-06-04 RX ORDER — METHOCARBAMOL 750 MG/1
750 TABLET, FILM COATED ORAL
Status: COMPLETED | OUTPATIENT
Start: 2020-06-04 | End: 2020-06-04

## 2020-06-04 RX ORDER — ACETAMINOPHEN 325 MG/1
975 TABLET ORAL ONCE
Status: DISCONTINUED | OUTPATIENT
Start: 2020-06-04 | End: 2020-06-04 | Stop reason: HOSPADM

## 2020-06-04 RX ORDER — CEFAZOLIN SODIUM 2 G/100ML
2 INJECTION, SOLUTION INTRAVENOUS
Status: DISCONTINUED | OUTPATIENT
Start: 2020-06-04 | End: 2020-06-04 | Stop reason: HOSPADM

## 2020-06-04 RX ADMIN — ONDANSETRON 4 MG: 2 INJECTION INTRAMUSCULAR; INTRAVENOUS at 12:23

## 2020-06-04 RX ADMIN — HYDROMORPHONE HYDROCHLORIDE 0.5 MG: 1 INJECTION, SOLUTION INTRAMUSCULAR; INTRAVENOUS; SUBCUTANEOUS at 11:53

## 2020-06-04 RX ADMIN — HYDROMORPHONE HYDROCHLORIDE 0.5 MG: 1 INJECTION, SOLUTION INTRAMUSCULAR; INTRAVENOUS; SUBCUTANEOUS at 10:30

## 2020-06-04 RX ADMIN — METHOCARBAMOL 750 MG: 750 TABLET, FILM COATED ORAL at 13:53

## 2020-06-04 RX ADMIN — FENTANYL CITRATE 100 MCG: 50 INJECTION, SOLUTION INTRAMUSCULAR; INTRAVENOUS at 12:46

## 2020-06-04 RX ADMIN — HYDROMORPHONE HYDROCHLORIDE 0.5 MG: 1 INJECTION, SOLUTION INTRAMUSCULAR; INTRAVENOUS; SUBCUTANEOUS at 12:35

## 2020-06-04 RX ADMIN — ROCURONIUM BROMIDE 50 MG: 10 INJECTION INTRAVENOUS at 11:09

## 2020-06-04 RX ADMIN — LIDOCAINE HYDROCHLORIDE 70 MG: 20 INJECTION, SOLUTION INFILTRATION; PERINEURAL at 11:06

## 2020-06-04 RX ADMIN — FENTANYL CITRATE 50 MCG: 50 INJECTION, SOLUTION INTRAMUSCULAR; INTRAVENOUS at 12:33

## 2020-06-04 RX ADMIN — SODIUM CHLORIDE, POTASSIUM CHLORIDE, SODIUM LACTATE AND CALCIUM CHLORIDE: 600; 310; 30; 20 INJECTION, SOLUTION INTRAVENOUS at 11:02

## 2020-06-04 RX ADMIN — LORAZEPAM 0.5 MG: 2 INJECTION INTRAMUSCULAR; INTRAVENOUS at 13:10

## 2020-06-04 RX ADMIN — FENTANYL CITRATE 100 MCG: 50 INJECTION, SOLUTION INTRAMUSCULAR; INTRAVENOUS at 11:06

## 2020-06-04 RX ADMIN — FENTANYL CITRATE 50 MCG: 50 INJECTION INTRAMUSCULAR; INTRAVENOUS at 13:18

## 2020-06-04 RX ADMIN — MIDAZOLAM 2 MG: 1 INJECTION INTRAMUSCULAR; INTRAVENOUS at 10:59

## 2020-06-04 RX ADMIN — FENTANYL CITRATE 100 MCG: 50 INJECTION, SOLUTION INTRAMUSCULAR; INTRAVENOUS at 12:38

## 2020-06-04 RX ADMIN — DEXAMETHASONE SODIUM PHOSPHATE 4 MG: 4 INJECTION, SOLUTION INTRAMUSCULAR; INTRAVENOUS at 11:08

## 2020-06-04 RX ADMIN — FENTANYL CITRATE 50 MCG: 50 INJECTION INTRAMUSCULAR; INTRAVENOUS at 13:02

## 2020-06-04 RX ADMIN — HYDROMORPHONE HYDROCHLORIDE 0.5 MG: 1 INJECTION, SOLUTION INTRAMUSCULAR; INTRAVENOUS; SUBCUTANEOUS at 13:37

## 2020-06-04 RX ADMIN — HYDROMORPHONE HYDROCHLORIDE 0.5 MG: 1 INJECTION, SOLUTION INTRAMUSCULAR; INTRAVENOUS; SUBCUTANEOUS at 12:40

## 2020-06-04 RX ADMIN — HYDROMORPHONE HYDROCHLORIDE 0.5 MG: 1 INJECTION, SOLUTION INTRAMUSCULAR; INTRAVENOUS; SUBCUTANEOUS at 13:27

## 2020-06-04 RX ADMIN — FENTANYL CITRATE 50 MCG: 50 INJECTION, SOLUTION INTRAMUSCULAR; INTRAVENOUS at 11:53

## 2020-06-04 RX ADMIN — PROPOFOL 200 MG: 10 INJECTION, EMULSION INTRAVENOUS at 11:08

## 2020-06-04 RX ADMIN — ACETAMINOPHEN 975 MG: 325 TABLET, FILM COATED ORAL at 09:30

## 2020-06-04 RX ADMIN — SUGAMMADEX 200 MG: 100 INJECTION, SOLUTION INTRAVENOUS at 12:28

## 2020-06-04 RX ADMIN — OXYCODONE HYDROCHLORIDE 5 MG: 5 TABLET ORAL at 14:24

## 2020-06-04 ASSESSMENT — MIFFLIN-ST. JEOR: SCORE: 1609.37

## 2020-06-04 NOTE — ANESTHESIA CARE TRANSFER NOTE
Patient: Ortega Pride Manjeet    Procedure(s):  Revision Right Lumbar 5-Sacral 1 Microdiscectomy    Diagnosis: Lumbar disc herniation with radiculopathy [M51.16]  Diagnosis Additional Information: No value filed.    Anesthesia Type:   General     Note:  Airway :Face Mask  Patient transferred to:PACU  Handoff Report: Identifed the Patient, Identified the Reponsible Provider, Reviewed the pertinent medical history, Discussed the surgical course, Reviewed Intra-OP anesthesia mangement and issues during anesthesia, Set expectations for post-procedure period and Allowed opportunity for questions and acknowledgement of understanding      Vitals: (Last set prior to Anesthesia Care Transfer)    CRNA VITALS  6/4/2020 1203 - 6/4/2020 1250      6/4/2020             Temp:  36.7  C (98.1  F)                Electronically Signed By: EFRAÍN Emery CRNA  June 4, 2020  12:50 PM

## 2020-06-04 NOTE — BRIEF OP NOTE
[facility], [place]    Brief Operative Note    Pre-operative diagnosis: Lumbar disc herniation with radiculopathy [M51.16]  Post-operative diagnosis Same as pre-operative diagnosis    Procedure: Procedure(s):  Revision Right Lumbar 5-Sacral 1 Microdiscectomy  Surgeon: Surgeon(s) and Role:     * Dmitri Waldrop MD - Primary     * Shukri Murray PA-C - Assisting  Anesthesia: General   Estimated blood loss: Less than 50 ml  Drains: None  Specimens: * No specimens in log *  Findings:   None.  Complications: None.  Implants: * No implants in log *      Assessment and Plan: Ortega Burroughs is a 40 year old male with PMH including history of abdominal pain, anxiety, chronic pain, gastroesophageal reflux, inguinal hernia, insomnia, mood disorder, neuralgia, hyperlipidemia, panic disorder, perichondritis of pinna, medial meniscal tear of knee, tobacco use, tremor now s/p  Procedure(s):  Revision Right Lumbar 5-Sacral 1 Microdiscectomyon 6/4/20 with Dr. Waldrop.     Benjie Primary  Activity: Up with assist until independent. No excessive bending or twisting. No lifting >10 lbs x 6 weeks. No Makayla lift for transfers.   Weight bearing status: WBAT.  Pain management:  No NSAIDs for 6-7 days   Diet: Begin with clear fluids and progress diet as tolerated.   DVT prophylaxis: SCDs only. No chemical DVT ppx needed.  Dressings: Dermabond, 4 x 4 and Tegaderm.  Follow-up: Clinic with Dr. Waldrop in 6 weeks with repeat x-rays.   Disposition: Pending progress with therapies, pain control on orals, and medical stability, anticipate discharge to home on POD #0.

## 2020-06-04 NOTE — DISCHARGE INSTRUCTIONS
Same-Day Surgery   Adult Discharge Orders & Instructions     For 24 hours after surgery:  1. Get plenty of rest.  A responsible adult must stay with you for at least 24 hours after you leave the hospital.   2. Pain medication can slow your reflexes. Do not drive or use heavy equipment.  If you have weakness or tingling, don't drive or use heavy equipment until this feeling goes away.  3. Mixing alcohol and pain medication can cause dizziness and slow your breathing. It can even be fatal. Do not drink alcohol while taking pain medication.  4. Avoid strenuous or risky activities.  Ask for help when climbing stairs.   5. You may feel lightheaded.  If so, sit for a few minutes before standing.  Have someone help you get up.   6. If you have nausea (feel sick to your stomach), drink only clear liquids such as apple juice, ginger ale, broth or 7-Up.  Rest may also help.  Be sure to drink enough fluids.  Move to a regular diet as you feel able. Take pain medications with a small amount of solid food, such as toast or crackers, to avoid nausea.   7. A slight fever is normal. Call the doctor if your fever is over 100 F (37.7 C) (taken under the tongue) or lasts longer than 24 hours.  8. You may have a dry mouth, muscle aches, trouble sleeping or a sore throat.  These symptoms should go away after 24 hours.  9. Do not make important or legal decisions.   Pain Management:      1. Take pain medication (if prescribed) for pain as directed by your physician.        2. WARNING: If the pain medication you have been prescribed contains Tylenol  (acetaminophen), DO NOT take additional doses of Tylenol (acetaminophen).     Call your doctor for any of the followin.  Signs of infection (fever, growing tenderness at the surgery site, severe pain, a large amount of drainage or bleeding, foul-smelling drainage, redness, swelling).    2.  It has been over 8 to 10 hours since surgery and you are still not able to urinate (pee).    3.   Headache for over 24 hours.    4.  Numbness, tingling or weakness the day after surgery (if you had spinal anesthesia).  To contact a doctor, call _____________________________________ or:      [phone] and ask for the Resident On Call for:          __________________________________________ (answered 24 hours a day)      Emergency Department:  [facility]: [phone]  [place] [facility]: [phone]               Rev. 10/2014

## 2020-06-04 NOTE — ANESTHESIA POSTPROCEDURE EVALUATION
Anesthesia POST Procedure Evaluation    Patient: Ortega Burroughs   MRN:     [id number] Gender:   male   Age:    40 year old :      1980        Preoperative Diagnosis: Lumbar disc herniation with radiculopathy [M51.16]   Procedure(s):  Revision Right Lumbar 5-Sacral 1 Microdiscectomy   Postop Comments: No value filed.     Anesthesia Type: General       Disposition: Outpatient   Postop Pain Control: Uneventful            Sign Out: Well controlled pain   PONV: No   Neuro/Psych: Uneventful            Sign Out: Acceptable/Baseline neuro status   Airway/Respiratory: Uneventful            Sign Out: Acceptable/Baseline resp. status   CV/Hemodynamics: Uneventful            Sign Out: Acceptable CV status   Other NRE: NONE   DID A NON-ROUTINE EVENT OCCUR? No         Last Anesthesia Record Vitals:  CRNA VITALS  2020 1203 - 2020 1303      2020             Temp:  36.7  C (98.1  F)          Last PACU Vitals:  Vitals Value Taken Time   /89 2020  1:30 PM   Temp 36.7  C (98.1  F) 2020  1:15 PM   Pulse 91 2020  1:30 PM   Resp 15 2020  1:39 PM   SpO2 99 % 2020  1:39 PM   Temp src     NIBP 148/71 2020 12:45 PM   Pulse     SpO2 97 % 2020 12:45 PM   Resp     Temp 36.7  C (98.1  F) 2020 12:48 PM   Ht Rate 94 2020 12:45 PM   Temp 2     Vitals shown include unvalidated device data.      Electronically Signed By: Radha Victor MD, 2020, 1:40 PM

## 2020-06-05 NOTE — OP NOTE
DATE OF SURGERY: 6/4/2020    PREOPERATIVE DIAGNOSIS: Lumbar disc herniation with radiculopathy        POSTOPERATIVE DIAGNOSIS: Same    PROCEDURES:  22-Modifier on the entire procedure: This was a revision microdiscectomy.  There is quite a bit of scarring in the epidural space with significantly increased the difficulty and operative time of the case and thus I have added a 22 modifier.  1.  Revision right sided L5-S1 microdiscectomy    PRIMARY SURGEON: Dmitri Waldrop MD    FIRST ASSISTANT: ALBERTO Blankenship.  There is no qualified resident available.  The assistant was necessary for retraction visualization and wound closure.    ANESTHESIA: General Endotracheal    COMPLICATIONS:  None.    SPECIMENS: None.    ESTIMATED BLOOD LOSS: 25cc    INDICATIONS:                          Ortega Burroughs is a 40 year old male who elected surgical treatment, and understood the indications for this surgery, as well as its risks, benefits, and alternatives as documented in the pre-operative H&P.  Specifically, we reviewed the risks and benefits of the surgery in detail. The risks include, but are not limited to, the general risks associated with anesthesia, including death, pulmonary embolism, DVT, stroke, myocardial infarction, pneumonia, and urinary tract infection. Additional risks specific to the surgery include the risk of infection, dural tear with resultant CSF leak which might necessitate placement of a drain or revision surgery or could result in headaches, nerve injury resulting in weakness or paralysis, risk of adjacent segment disease, the risks of vascular injury, need for revision surgery in the future due to one of the above issues, or risk of incomplete symptom relief. Ortega Burroughs understands the risks of the surgery and wishes to proceed.  No Guarantees were given.       DESCRIPTION OF PROCEDURE:           Ortega Burroughs was taken to the operating  room, where the Anesthesiology Service induced  satisfactory general anesthesia.  Ancef was given IV.  Venous thromboembolic prophylaxis was performed with sequential devices.  A Murray catheter was placed under standard sterile techniques.  The patient was placed prone on an open OSI frame with the abdomen hanging free and all bony prominences well padded.  The low back was then prepped and draped in its entirety in the usual sterile fashion.  We then held a multidisciplinary time out in which we verified the patient, procedure, antibiotics, and operative plan.  All team members were in agreement.    Digital radiography was brought into the sterile field to obtain a true lateral view and needles were placed to manas the intended point of incision.  We made a midline incision and a right sided subperiosteal exposure was performed of the L5 through S1 spinous processes and medial lamina.  A needle was placed into the medial facet joint at the caudal most level and a final image was then obtained to verify our position.     I then went about dissecting the scar off of the dorsal lamina and epidural space with a combination of Luna and curette.  There was quite dense epidural scarring.  I took a fair amount of effort to safely identify the medial bony border and work into the lateral recess through the previously dissected spinal canal.  I did use a bur and an osteotome to remove an additional 2 to 3 mm of the medial facet joint and the leading edge of the L5 lamina to create additional working space.  Eventually I was able to dissect lateral to the nerve root and down to the level of the disc.  There is an obvious vinh disc herniation.  I incised into the annulus and we got about 1 cc of loose disc material out.  In addition to this there was a significant amount of scarring in the epidural space and the nerve root itself looked quite irritated.  I was able to palpate around with a Richardson and I did remove some additional bone to make it flush with the pedicle so  that there was no further impingement from the bony lateral recess and at this point the nerve seemed quite free.    I then thoroughly irrigated.  I injected local Marcaine for anesthetic.  I also placed 5 mg of Kenalog onto the nerve root in the epidural space given how scarred and irritated it looked.    Hemostasis was achieved.  A hemovac drain was not placed.  The wound was closed in layers with vicryl suture, followed by monocryl and dermabond for the skin.  A sterile dressing was applied. The patient was turned supine, extubated, and returned to the recovery area in stable condition.      I was present and scrubbed for the critical portions of the procedure including the exposure and decompression.    Dmitri Waldrop MD

## 2020-06-08 ENCOUNTER — TELEPHONE (OUTPATIENT)
Dept: ORTHOPEDICS | Facility: CLINIC | Age: 40
End: 2020-06-08

## 2020-06-08 DIAGNOSIS — G89.18 POST-OP PAIN: Primary | ICD-10-CM

## 2020-06-08 DIAGNOSIS — M54.16 LUMBAR RADICULOPATHY: ICD-10-CM

## 2020-06-08 RX ORDER — METHYLPREDNISOLONE 4 MG
TABLET, DOSE PACK ORAL
Qty: 21 TABLET | Refills: 0 | Status: SHIPPED | OUTPATIENT
Start: 2020-06-08 | End: 2020-06-23

## 2020-06-08 NOTE — TELEPHONE ENCOUNTER
[facility] Call Center    Phone Message    May a detailed message be left on voicemail: yes     Reason for Call: Symptoms or Concerns     If patient has red-flag symptoms, warm transfer to triage line    Current symptom or concern: numbness in R leg, foot and toes    Symptoms have been present for:  4 day(s)    Has patient previously been seen for this? No    By : Dr. Waldrop    Date: 6/4/20    Are there any new or worsening symptoms? Yes: see above, pt is also needing to discuss getting a stronger pain medication too      Action Taken: Message routed to:  [facility] ortho

## 2020-06-08 NOTE — PROGRESS NOTES
Per Dr Waldrop order, RN placed an order for lumbar MRI and medrol dosepak. And have patient to see Dr. Waldrop in clinic.  RN called and spoke to patient, notifying him of these order.    Kwaku Romano RN

## 2020-06-08 NOTE — TELEPHONE ENCOUNTER
[facility] Call Center    Phone Message    May a detailed message be left on voicemail: yes     Reason for Call: Other: pt calling to respond to a voicemail he got, The pt respose is that he is still having pain in is leg/ butt and the numbness is in his foot ( toes, top and bottom, also the sheen).  Pt pain is serve and is becoming unbareable. Please call the pt back to discuss. Thank You.      Action Taken: Message routed to:  [facility] orthopedics    Travel Screening: Not Applicable

## 2020-06-08 NOTE — TELEPHONE ENCOUNTER
I called and spoke to Ortega.  He is able to move the ankle up and down and the big toe.  He feels it is a little weak to plantar flex and that the lateral toes are numb, so sounds like an S1 distribution at this time.   He had numbness when he woke up from surgery, but not really any pain.  Starting yesterday, which was 3.5 days out from surgery, his pain became severe, and is located in the buttock and back, but does not radiate into the foot or lower leg.    He did have severe scarring of the nerve.  I told him it could be nerve irritation and I would like him to try a medrol dose pack.  The second possibility is either a residual or recurrent disc herniation.  The third possibility is an epidural hematoma.       He has no real right leg symptoms with no signs of cauda equina,The left leg is not floppy and although he cannot grade the severity of weakness, he notes he is able to point the toes to the ground, so it is perhaps at a minimum 2 or 3 out of five, perhaps 4/5.  Given this,  I think it is safe to wait until tomorrow AM to be seen when I am in clinic.  The other alternative would be the ED, but he and I felt this was not necessary with this degree of symptoms.  I will see him tomorrow AM.      Dmitri Waldrop MD

## 2020-06-09 ENCOUNTER — OFFICE VISIT (OUTPATIENT)
Dept: ORTHOPEDICS | Facility: CLINIC | Age: 40
End: 2020-06-09
Payer: COMMERCIAL

## 2020-06-09 ENCOUNTER — ANCILLARY PROCEDURE (OUTPATIENT)
Dept: MRI IMAGING | Facility: CLINIC | Age: 40
End: 2020-06-09
Attending: ORTHOPAEDIC SURGERY
Payer: COMMERCIAL

## 2020-06-09 VITALS — WEIGHT: 162 LBS | BODY MASS INDEX: 24.55 KG/M2 | HEIGHT: 68 IN

## 2020-06-09 DIAGNOSIS — M54.16 LUMBAR RADICULOPATHY: ICD-10-CM

## 2020-06-09 DIAGNOSIS — M51.16 LUMBAR DISC HERNIATION WITH RADICULOPATHY: ICD-10-CM

## 2020-06-09 DIAGNOSIS — G89.18 POST-OP PAIN: ICD-10-CM

## 2020-06-09 DIAGNOSIS — M54.16 LUMBAR RADICULOPATHY: Primary | ICD-10-CM

## 2020-06-09 RX ORDER — DOCUSATE SODIUM 100 MG/1
100 CAPSULE, LIQUID FILLED ORAL 2 TIMES DAILY
Qty: 20 CAPSULE | Refills: 0 | Status: ON HOLD | OUTPATIENT
Start: 2020-06-09 | End: 2020-06-30

## 2020-06-09 RX ORDER — DIAZEPAM 5 MG
5 TABLET ORAL EVERY 6 HOURS PRN
Qty: 20 TABLET | Refills: 0 | Status: SHIPPED | OUTPATIENT
Start: 2020-06-09 | End: 2020-06-15

## 2020-06-09 RX ORDER — HYDROCODONE BITARTRATE AND ACETAMINOPHEN 5; 325 MG/1; MG/1
1-2 TABLET ORAL EVERY 4 HOURS PRN
Qty: 50 TABLET | Refills: 0 | Status: SHIPPED | OUTPATIENT
Start: 2020-06-09 | End: 2020-06-15

## 2020-06-09 ASSESSMENT — ENCOUNTER SYMPTOMS
WEAKNESS: 1
SEIZURES: 0
INSOMNIA: 1
FEVER: 0
FATIGUE: 1
ALTERED TEMPERATURE REGULATION: 0
POLYDIPSIA: 0
TREMORS: 1
MUSCLE CRAMPS: 1
DEPRESSION: 1
NUMBNESS: 1
WEIGHT LOSS: 0
NIGHT SWEATS: 0
STIFFNESS: 1
NERVOUS/ANXIOUS: 1
PANIC: 1
MYALGIAS: 1
SPEECH CHANGE: 0
POLYPHAGIA: 0
CHILLS: 0
HEADACHES: 0
ARTHRALGIAS: 1
INCREASED ENERGY: 1
DECREASED CONCENTRATION: 1
DECREASED APPETITE: 0
PARALYSIS: 1
DIZZINESS: 0
WEIGHT GAIN: 0
NECK PAIN: 0
DISTURBANCES IN COORDINATION: 0
TINGLING: 1
MUSCLE WEAKNESS: 1
JOINT SWELLING: 1
MEMORY LOSS: 0
BACK PAIN: 1
HALLUCINATIONS: 0
LOSS OF CONSCIOUSNESS: 0

## 2020-06-09 ASSESSMENT — MIFFLIN-ST. JEOR: SCORE: 1609.33

## 2020-06-09 NOTE — NURSING NOTE
Reason For Visit:   Chief Complaint   Patient presents with     RECHECK     patient having increased pain DOS 6/4/20       Primary MD: No Ref-Primary, Physician  Ref. MD: Self     ?  No  Date of surgery: Dr. Waldrop   Type of surgery: Dr. Waldrop .  Smoker: No  Request smoking cessation information: No    There were no vitals taken for this visit.         Oswestry (ANJANA) Questionnaire    OSWESTRY DISABILITY INDEX 6/1/2020   Count 10   Sum 34   Oswestry Score (%) 68            Neck Disability Index (NDI) Questionnaire    No flowsheet data found.                Promis 10 Assessment    PROMIS 10 6/9/2020   In general, would you say your health is: Poor   In general, would you say your quality of life is: Fair   In general, how would you rate your physical health? Poor   In general, how would you rate your mental health, including your mood and your ability to think? Fair   In general, how would you rate your satisfaction with your social activities and relationships? Good   In general, please rate how well you carry out your usual social activities and roles Poor   To what extent are you able to carry out your everyday physical activities such as walking, climbing stairs, carrying groceries, or moving a chair? Not at all   How often have you been bothered by emotional problems such as feeling anxious, depressed or irritable? Always   How would you rate your fatigue on average? Very severe   How would you rate your pain on average?   0 = No Pain  to  10 = Worst Imaginable Pain 8   In general, would you say your health is: 1   In general, would you say your quality of life is: 2   In general, how would you rate your physical health? 1   In general, how would you rate your mental health, including your mood and your ability to think? 2   In general, how would you rate your satisfaction with your social activities and relationships? 3   In general, please rate how well you carry out your usual social activities and  roles. (This includes activities at home, at work and in your community, and responsibilities as a parent, child, spouse, employee, friend, etc.) 1   To what extent are you able to carry out your everyday physical activities such as walking, climbing stairs, carrying groceries, or moving a chair? 1   In the past 7 days, how often have you been bothered by emotional problems such as feeling anxious, depressed, or irritable? 5   In the past 7 days, how would you rate your fatigue on average? 5   In the past 7 days, how would you rate your pain on average, where 0 means no pain, and 10 means worst imaginable pain? 8   Global Mental Health Score 8   Global Physical Health Score 5   PROMIS TOTAL - SUBSCORES 13                Arron Chopra, ATC

## 2020-06-09 NOTE — PROGRESS NOTES
Spine Surgery Return Clinic Visit      Chief Complaint:   RECHECK (patient having increased pain DOS 6/4/20)      Interval HPI:  Symptom Profile Including: location of symptoms, onset, severity, exacerbating/alleviating factors, previous treatments:        Ortega Burroughs is a 40 year old male who returns about a week status post revision right L5-S1 microdiscectomy.  At the time of his surgery last week there is only a small amount of disc material and there was a fair amount of dense epidural scarring and the nerve root looked quite irritated.  After surgery when he woke up he really did not have much pain but he did have some numbness on the plantar and lateral aspect of the foot and S1 distribution.  This he feels like is getting a little bit better.  However about 2 to 3 days ago he developed severe pain in his low back and right buttock.  This is made it hard for him to sleep over the last 2 or 3 days.  He did not have this pain right when he woke up from surgery.  I ordered a new MRI to make sure that there is no epidural hematoma or recurrent disc herniation.            Past Medical History:     Past Medical History:   Diagnosis Date     Anxiety      Arthritis      Lumbar radiculopathy             Past Surgical History:     Past Surgical History:   Procedure Laterality Date     APPENDECTOMY       CHOLECYSTECTOMY  2009     DISCECTOMY LUMBAR POSTERIOR MICROSCOPIC ONE LEVEL Right 3/9/2020    Procedure: Right Lumbar 5 to Sacral 1 microdiscectomy;  Surgeon: Dmitri Waldrop MD;  Location: [facility] OR     DISCECTOMY LUMBAR POSTERIOR MICROSCOPIC ONE LEVEL Right 6/4/2020    Procedure: Revision Right Lumbar 5-Sacral 1 Microdiscectomy;  Surgeon: Dmitri Waldrop MD;  Location: [facility] OR     HERNIA REPAIR  2005     KNEE SURGERY      3 on left knee, 1 on right knee (arthroscopic)            Social History:     Social History     Tobacco Use     Smoking status: Current Every Day Smoker     Packs/day: 0.50  "    Years: 25.00     Pack years: 12.50     Smokeless tobacco: Current User     Types: Chew   Substance Use Topics     Alcohol use: Not Currently     Frequency: Monthly or less            Family History:     Family History   Problem Relation Age of Onset     Chronic Obstructive Pulmonary Disease Mother      Hypertension Father             Allergies:     Allergies   Allergen Reactions     Metoclopramide Itching, Anaphylaxis, Cramps and Rash     Bupropion      Other reaction(s): IRREGULAR HEART RATE     Diclofenac GI Disturbance     Other reaction(s): Indigestion     Gabapentin      Other reaction(s): Depression, Depressive disorder     Meloxicam GI Disturbance            Medications:     Current Outpatient Medications   Medication     diazepam (VALIUM) 5 MG tablet     docusate sodium (COLACE) 100 MG capsule     HYDROcodone-acetaminophen (NORCO) 5-325 MG tablet     Lidocaine (LIDOCARE) 4 % Patch     medical cannabis (Patient's own supply)     methylPREDNISolone (MEDROL DOSEPAK) 4 MG tablet therapy pack     omeprazole (PRILOSEC) 20 MG DR capsule     ondansetron (ZOFRAN-ODT) 4 MG ODT tab     polyethylene glycol (MIRALAX) 17 g packet     pregabalin (LYRICA) 100 MG capsule     QUEtiapine (SEROQUEL) 50 MG tablet     senna-docusate (SENOKOT-S/PERICOLACE) 8.6-50 MG tablet     No current facility-administered medications for this visit.              Review of Systems:   A focused musculoskeletal and neurologic ROS was performed with pertinent positives and negatives noted in the HPI.  Additional systems were also reviewed and are documented at the bottom of the note.         Physical Exam:   Vitals: Ht 1.727 m (5' 8\")   Wt 73.5 kg (162 lb)   BMI 24.63 kg/m    Musculoskeletal, Neurologic, and Spine:     Cervical spine:    Appearance -no gross step-offs, kyphosis.    Motor -     C5: Deltoids R 5/5 and L 5/5 strength    C6: Biceps R 5/5 and L 5/5 strength     C7: Triceps R 5/5 and L 5/5 strength     C8:  R 5/5 and L 5/5 " strength     T1: Dorsal interossei R 4/5 and L 4/5 strength        Sensation: intact to light touch in C5-T1      Special Tests -      Lhermitte's Test - Negative     Spurling's Test - Negative      Parham's Test - Negative       Lumbar Spine:    Appearance - No gross stepoffs or deformities    Motor -     L2-3: Hip flexion 4/5 R and 5/5 L strength          L3/4:  Knee extension R 4/5 and L 5/5 strength         L4/5:  Foot dorsiflexion R 4/5 L 5/5 and       EHL dorsiflexion R 4/5 L 4/5 strength         S1:  Plantarflexion/Peroneal Muscles  R 4/5 and L 5/5 strength    Sensation: intact to light touch L3-S1 distribution BLE, diminished right S1      The patient is very painful all along the right side and has trouble giving full effort but I am not able to detect any gross focal deficits, more like global difficulty with effort         Imaging:   We ordered and independently reviewed new radiographs at this clinic visit. The results were discussed with the patient. Findings include:     Lumbar MRI was completed this morning.  I do not see any new disc fragments or epidural hematoma.       Assessment and Plan:     40 year old male with right S1 distribution numbness and right low back and buttock pain after revision discectomy.    The patient is very reasonable and I had a nice discussion with him today.  I told him I do not know for sure why the pain has flared.  I think the numbness is due to the scarring and irritation around the nerve root and I am glad that that seems to be slowly improving.  Certainly it is very frustrating that he is dealing with this back and buttock pain.  I would like him to try a Medrol dose pack and continue on his Valium and narcotic regimen.  I would like to see him again in clinic next Friday.  I told him that in the absence of gross neurologic deficit or obvious new neurologic compression I think we should try to avoid further surgery as a good chunk of this may simply be nerve root  irritation and we should give time for that to get better.  I will see him again in a week from this Friday to assess his progress.           Respectfully,  Dmitri Waldrop MD  Spine Surgery  [facility]    Answers for HPI/ROS submitted by the patient on 6/9/2020   General Symptoms: Yes  Skin Symptoms: No  HENT Symptoms: No  EYE SYMPTOMS: No  HEART SYMPTOMS: No  LUNG SYMPTOMS: No  INTESTINAL SYMPTOMS: No  URINARY SYMPTOMS: No  REPRODUCTIVE SYMPTOMS: No  SKELETAL SYMPTOMS: Yes  BLOOD SYMPTOMS: No  NERVOUS SYSTEM SYMPTOMS: Yes  MENTAL HEALTH SYMPTOMS: Yes  Fever: No  Loss of appetite: No  Weight loss: No  Weight gain: No  Fatigue: Yes  Night sweats: No  Chills: No  Increased stress: Yes  Excessive hunger: No  Excessive thirst: No  Feeling hot or cold when others believe the temperature is normal: No  Loss of height: No  Post-operative complications: Yes  Surgical site pain: Yes  Hallucinations: No  Change in or Loss of Energy: Yes  Hyperactivity: No  Confusion: No  Back pain: Yes  Muscle aches: Yes  Neck pain: No  Swollen joints: Yes  Joint pain: Yes  Bone pain: No  Muscle cramps: Yes  Muscle weakness: Yes  Joint stiffness: Yes  Bone fracture: No  Trouble with coordination: No  Dizziness or trouble with balance: No  Fainting or black-out spells: No  Memory loss: No  Headache: No  Seizures: No  Speech problems: No  Tingling: Yes  Tremor: Yes  Weakness: Yes  Difficulty walking: Yes  Paralysis: Yes  Numbness: Yes  Nervous or Anxious: Yes  Depression: Yes  Trouble sleeping: Yes  Trouble thinking or concentrating: Yes  Mood changes: Yes  Panic attacks: Yes

## 2020-06-09 NOTE — NURSING NOTE
"Reason For Visit:   Chief Complaint   Patient presents with     RECHECK     patient having increased pain DOS 6/4/20       Primary MD: No Ref-Primary, Physician  Ref. MD: Self     ?  No  Date of surgery: Dr. Waldrop   Type of surgery: Dr. Waldrop .  Smoker: No  Request smoking cessation information: No    Ht 1.727 m (5' 8\")   Wt 73.5 kg (162 lb)   BMI 24.63 kg/m           Oswestry (ANJANA) Questionnaire    OSWESTRY DISABILITY INDEX 6/1/2020   Count 10   Sum 34   Oswestry Score (%) 68            Neck Disability Index (NDI) Questionnaire    No flowsheet data found.                Promis 10 Assessment    PROMIS 10 6/9/2020   In general, would you say your health is: Poor   In general, would you say your quality of life is: Fair   In general, how would you rate your physical health? Poor   In general, how would you rate your mental health, including your mood and your ability to think? Fair   In general, how would you rate your satisfaction with your social activities and relationships? Good   In general, please rate how well you carry out your usual social activities and roles Poor   To what extent are you able to carry out your everyday physical activities such as walking, climbing stairs, carrying groceries, or moving a chair? Not at all   How often have you been bothered by emotional problems such as feeling anxious, depressed or irritable? Always   How would you rate your fatigue on average? Very severe   How would you rate your pain on average?   0 = No Pain  to  10 = Worst Imaginable Pain 8   In general, would you say your health is: 1   In general, would you say your quality of life is: 2   In general, how would you rate your physical health? 1   In general, how would you rate your mental health, including your mood and your ability to think? 2   In general, how would you rate your satisfaction with your social activities and relationships? 3   In general, please rate how well you carry out your usual " social activities and roles. (This includes activities at home, at work and in your community, and responsibilities as a parent, child, spouse, employee, friend, etc.) 1   To what extent are you able to carry out your everyday physical activities such as walking, climbing stairs, carrying groceries, or moving a chair? 1   In the past 7 days, how often have you been bothered by emotional problems such as feeling anxious, depressed, or irritable? 5   In the past 7 days, how would you rate your fatigue on average? 5   In the past 7 days, how would you rate your pain on average, where 0 means no pain, and 10 means worst imaginable pain? 8   Global Mental Health Score 8   Global Physical Health Score 5   PROMIS TOTAL - SUBSCORES 13                Arron Chopra, ATC

## 2020-06-09 NOTE — LETTER
6/9/2020     RE: Ortega Burroughs  [address] [place] [address]    Dear Colleague,    Thank you for referring your patient, Ortega Burroughs, to the [facility]. Please see a copy of my visit note below.    Spine Surgery Return Clinic Visit      Chief Complaint:   RECHECK (patient having increased pain DOS 6/4/20)      Interval HPI:  Symptom Profile Including: location of symptoms, onset, severity, exacerbating/alleviating factors, previous treatments:        Ortega Burroughs is a 40 year old male who returns about a week status post revision right L5-S1 microdiscectomy.  At the time of his surgery last week there is only a small amount of disc material and there was a fair amount of dense epidural scarring and the nerve root looked quite irritated.  After surgery when he woke up he really did not have much pain but he did have some numbness on the plantar and lateral aspect of the foot and S1 distribution.  This he feels like is getting a little bit better.  However about 2 to 3 days ago he developed severe pain in his low back and right buttock.  This is made it hard for him to sleep over the last 2 or 3 days.  He did not have this pain right when he woke up from surgery.  I ordered a new MRI to make sure that there is no epidural hematoma or recurrent disc herniation.            Past Medical History:     Past Medical History:   Diagnosis Date     Anxiety      Arthritis      Lumbar radiculopathy             Past Surgical History:     Past Surgical History:   Procedure Laterality Date     APPENDECTOMY       CHOLECYSTECTOMY  2009     DISCECTOMY LUMBAR POSTERIOR MICROSCOPIC ONE LEVEL Right 3/9/2020    Procedure: Right Lumbar 5 to Sacral 1 microdiscectomy;  Surgeon: Dmitri Waldrop MD;  [facility]     DISCECTOMY LUMBAR POSTERIOR MICROSCOPIC ONE LEVEL Right 6/4/2020    Procedure: Revision Right Lumbar 5-Sacral 1 Microdiscectomy;  Surgeon: Dmitri Waldrop,  "MD;  Location: [facility] OR     HERNIA REPAIR  2005     KNEE SURGERY      3 on left knee, 1 on right knee (arthroscopic)            Social History:     Social History     Tobacco Use     Smoking status: Current Every Day Smoker     Packs/day: 0.50     Years: 25.00     Pack years: 12.50     Smokeless tobacco: Current User     Types: Chew   Substance Use Topics     Alcohol use: Not Currently     Frequency: Monthly or less            Family History:     Family History   Problem Relation Age of Onset     Chronic Obstructive Pulmonary Disease Mother      Hypertension Father             Allergies:     Allergies   Allergen Reactions     Metoclopramide Itching, Anaphylaxis, Cramps and Rash     Bupropion      Other reaction(s): IRREGULAR HEART RATE     Diclofenac GI Disturbance     Other reaction(s): Indigestion     Gabapentin      Other reaction(s): Depression, Depressive disorder     Meloxicam GI Disturbance            Medications:     Current Outpatient Medications   Medication     diazepam (VALIUM) 5 MG tablet     docusate sodium (COLACE) 100 MG capsule     HYDROcodone-acetaminophen (NORCO) 5-325 MG tablet     Lidocaine (LIDOCARE) 4 % Patch     medical cannabis (Patient's own supply)     methylPREDNISolone (MEDROL DOSEPAK) 4 MG tablet therapy pack     omeprazole (PRILOSEC) 20 MG DR capsule     ondansetron (ZOFRAN-ODT) 4 MG ODT tab     polyethylene glycol (MIRALAX) 17 g packet     pregabalin (LYRICA) 100 MG capsule     QUEtiapine (SEROQUEL) 50 MG tablet     senna-docusate (SENOKOT-S/PERICOLACE) 8.6-50 MG tablet     No current facility-administered medications for this visit.              Review of Systems:   A focused musculoskeletal and neurologic ROS was performed with pertinent positives and negatives noted in the HPI.  Additional systems were also reviewed and are documented at the bottom of the note.         Physical Exam:   Vitals: Ht 1.727 m (5' 8\")   Wt 73.5 kg (162 lb)   BMI 24.63 kg/m    Musculoskeletal, Neurologic, " and Spine:     Cervical spine:    Appearance -no gross step-offs, kyphosis.    Motor -     C5: Deltoids R 5/5 and L 5/5 strength    C6: Biceps R 5/5 and L 5/5 strength     C7: Triceps R 5/5 and L 5/5 strength     C8:  R 5/5 and L 5/5 strength     T1: Dorsal interossei R 4/5 and L 4/5 strength        Sensation: intact to light touch in C5-T1      Special Tests -      Lhermitte's Test - Negative     Spurling's Test - Negative      Parham's Test - Negative       Lumbar Spine:    Appearance - No gross stepoffs or deformities    Motor -     L2-3: Hip flexion 4/5 R and 5/5 L strength          L3/4:  Knee extension R 4/5 and L 5/5 strength         L4/5:  Foot dorsiflexion R 4/5 L 5/5 and       EHL dorsiflexion R 4/5 L 4/5 strength         S1:  Plantarflexion/Peroneal Muscles  R 4/5 and L 5/5 strength    Sensation: intact to light touch L3-S1 distribution BLE, diminished right S1      The patient is very painful all along the right side and has trouble giving full effort but I am not able to detect any gross focal deficits, more like global difficulty with effort         Imaging:   We ordered and independently reviewed new radiographs at this clinic visit. The results were discussed with the patient. Findings include:     Lumbar MRI was completed this morning.  I do not see any new disc fragments or epidural hematoma.       Assessment and Plan:     40 year old male with right S1 distribution numbness and right low back and buttock pain after revision discectomy.    The patient is very reasonable and I had a nice discussion with him today.  I told him I do not know for sure why the pain has flared.  I think the numbness is due to the scarring and irritation around the nerve root and I am glad that that seems to be slowly improving.  Certainly it is very frustrating that he is dealing with this back and buttock pain.  I would like him to try a Medrol dose pack and continue on his Valium and narcotic regimen.  I would  like to see him again in clinic next Friday.  I told him that in the absence of gross neurologic deficit or obvious new neurologic compression I think we should try to avoid further surgery as a good chunk of this may simply be nerve root irritation and we should give time for that to get better.  I will see him again in a week from this Friday to assess his progress.           Respectfully,  Dmitir Waldrop MD  Spine Surgery  [facility]    Answers for HPI/ROS submitted by the patient on 6/9/2020   General Symptoms: Yes  Skin Symptoms: No  HENT Symptoms: No  EYE SYMPTOMS: No  HEART SYMPTOMS: No  LUNG SYMPTOMS: No  INTESTINAL SYMPTOMS: No  URINARY SYMPTOMS: No  REPRODUCTIVE SYMPTOMS: No  SKELETAL SYMPTOMS: Yes  BLOOD SYMPTOMS: No  NERVOUS SYSTEM SYMPTOMS: Yes  MENTAL HEALTH SYMPTOMS: Yes  Fever: No  Loss of appetite: No  Weight loss: No  Weight gain: No  Fatigue: Yes  Night sweats: No  Chills: No  Increased stress: Yes  Excessive hunger: No  Excessive thirst: No  Feeling hot or cold when others believe the temperature is normal: No  Loss of height: No  Post-operative complications: Yes  Surgical site pain: Yes  Hallucinations: No  Change in or Loss of Energy: Yes  Hyperactivity: No  Confusion: No  Back pain: Yes  Muscle aches: Yes  Neck pain: No  Swollen joints: Yes  Joint pain: Yes  Bone pain: No  Muscle cramps: Yes  Muscle weakness: Yes  Joint stiffness: Yes  Bone fracture: No  Trouble with coordination: No  Dizziness or trouble with balance: No  Fainting or black-out spells: No  Memory loss: No  Headache: No  Seizures: No  Speech problems: No  Tingling: Yes  Tremor: Yes  Weakness: Yes  Difficulty walking: Yes  Paralysis: Yes  Numbness: Yes  Nervous or Anxious: Yes  Depression: Yes  Trouble sleeping: Yes  Trouble thinking or concentrating: Yes  Mood changes: Yes  Panic attacks: Yes    Again, thank you for allowing me to participate in the care of your patient.      Sincerely,      Dmitri FRIAS  MD Benjie

## 2020-06-12 ENCOUNTER — MYC MEDICAL ADVICE (OUTPATIENT)
Dept: ORTHOPEDICS | Facility: CLINIC | Age: 40
End: 2020-06-12

## 2020-06-15 DIAGNOSIS — M51.16 LUMBAR DISC HERNIATION WITH RADICULOPATHY: ICD-10-CM

## 2020-06-15 RX ORDER — DIAZEPAM 5 MG
5 TABLET ORAL EVERY 6 HOURS PRN
Qty: 20 TABLET | Refills: 0 | Status: SHIPPED | OUTPATIENT
Start: 2020-06-15 | End: 2020-06-22

## 2020-06-15 RX ORDER — AMOXICILLIN 250 MG
1-2 CAPSULE ORAL 2 TIMES DAILY
Qty: 30 TABLET | Refills: 0 | Status: SHIPPED | OUTPATIENT
Start: 2020-06-15 | End: 2020-06-22

## 2020-06-15 RX ORDER — HYDROCODONE BITARTRATE AND ACETAMINOPHEN 5; 325 MG/1; MG/1
1-2 TABLET ORAL EVERY 4 HOURS PRN
Qty: 50 TABLET | Refills: 0 | Status: SHIPPED | OUTPATIENT
Start: 2020-06-15 | End: 2020-06-22

## 2020-06-15 NOTE — TELEPHONE ENCOUNTER
RN sent med refill request to Jami MENDIETA to sign and authorize.    Patient is notified via javon Romano RN

## 2020-06-16 ENCOUNTER — OFFICE VISIT (OUTPATIENT)
Dept: ORTHOPEDICS | Facility: CLINIC | Age: 40
End: 2020-06-16
Payer: COMMERCIAL

## 2020-06-16 VITALS — HEIGHT: 68 IN | WEIGHT: 162.1 LBS | BODY MASS INDEX: 24.57 KG/M2

## 2020-06-16 DIAGNOSIS — M54.16 LUMBAR RADICULOPATHY: ICD-10-CM

## 2020-06-16 DIAGNOSIS — G89.18 POST-OP PAIN: Primary | ICD-10-CM

## 2020-06-16 RX ORDER — PREDNISONE 20 MG/1
TABLET ORAL
Qty: 18 TABLET | Refills: 0 | Status: SHIPPED | OUTPATIENT
Start: 2020-06-16 | End: 2020-06-23

## 2020-06-16 ASSESSMENT — MIFFLIN-ST. JEOR: SCORE: 1609.78

## 2020-06-16 NOTE — PROGRESS NOTES
RN send med to patient's pharmacy per Dr. Waldrop's request  [facility] Wal[facility]  RN told patient to have the image sent to us. [facility] they may not have the pushing capability. Patient expressed understanding that he will get the CT scans and put them in a CD and bring to us ideally before Friday so when Dr. Waldrop talks to him virtually he can view the CT imaging.    Kwaku Romano RN

## 2020-06-16 NOTE — TELEPHONE ENCOUNTER
Called vinh and left a voicemail stating that we could see him today at 4:20 pm but if he showed up at 3 we have a chance to work him in earlier. If he calls back please help schedule him for 4:20 pm

## 2020-06-16 NOTE — LETTER
6/16/2020     RE: Ortega Burroughs  [address]  [place] [address]    Dear Colleague,    Thank you for referring your patient, Ortega Burroughs, to the [facility]. Please see a copy of my visit note below.    RN send med to patient's pharmacy per Dr. Waldrop's request  Dr. Waldrop [facility].  RN told patient to have the image sent to us. [facility] they may not have the pushing [facility]. Patient expressed understanding that he will get the CT scans and put them in a CD and bring to us ideally before Friday so when Dr. Waldrop talks to him virtually he can view the CT imaging.    Kwaku Romano RN      Spine Surgery Return Clinic Visit      Chief Complaint:   RECHECK (follow up on patients symptoms )      Interval HPI:  Symptom Profile Including: location of symptoms, onset, severity, exacerbating/alleviating factors, previous treatments:        Ortega Burroughs is a 40 year old male who returns again today status post revision discectomy.  He did well for about 2 or 3 days after the surgery then had a recurrence of pain.  First it was just numbness, but this past Thursday he had a fall where he felt like the leg gave out on him and now his pain is again much more severe so we have had him come in again for early follow-up.  I did see him last week at the time of the numbness complaint and certainly his pain is much worse today.  He is really uncomfortable in the chair today.            Past Medical History:     Past Medical History:   Diagnosis Date     Anxiety      Arthritis      Lumbar radiculopathy             Past Surgical History:     Past Surgical History:   Procedure Laterality Date     APPENDECTOMY       CHOLECYSTECTOMY  2009     DISCECTOMY LUMBAR POSTERIOR MICROSCOPIC ONE LEVEL Right 3/9/2020    Procedure: Right Lumbar 5 to Sacral 1 microdiscectomy;  Surgeon: Dmitri Waldrop MD;  [facility]     DISCECTOMY LUMBAR POSTERIOR  MICROSCOPIC ONE LEVEL Right 6/4/2020    Procedure: Revision Right Lumbar 5-Sacral 1 Microdiscectomy;  Surgeon: Dmitri Waldrop MD;  [facility]     HERNIA REPAIR  2005     KNEE SURGERY      3 on left knee, 1 on right knee (arthroscopic)            Social History:     Social History     Tobacco Use     Smoking status: Current Every Day Smoker     Packs/day: 0.50     Years: 25.00     Pack years: 12.50     Smokeless tobacco: Current User     Types: Chew   Substance Use Topics     Alcohol use: Not Currently     Frequency: Monthly or less            Family History:     Family History   Problem Relation Age of Onset     Chronic Obstructive Pulmonary Disease Mother      Hypertension Father             Allergies:     Allergies   Allergen Reactions     Metoclopramide Itching, Anaphylaxis, Cramps and Rash     Bupropion      Other reaction(s): IRREGULAR HEART RATE     Diclofenac GI Disturbance     Other reaction(s): Indigestion     Gabapentin      Other reaction(s): Depression, Depressive disorder     Meloxicam GI Disturbance            Medications:     Current Outpatient Medications   Medication     predniSONE (DELTASONE) 20 MG tablet     diazepam (VALIUM) 5 MG tablet     docusate sodium (COLACE) 100 MG capsule     HYDROcodone-acetaminophen (NORCO) 5-325 MG tablet     Lidocaine (LIDOCARE) 4 % Patch     medical cannabis (Patient's own supply)     methylPREDNISolone (MEDROL DOSEPAK) 4 MG tablet therapy pack     omeprazole (PRILOSEC) 20 MG DR capsule     ondansetron (ZOFRAN-ODT) 4 MG ODT tab     polyethylene glycol (MIRALAX) 17 g packet     pregabalin (LYRICA) 100 MG capsule     QUEtiapine (SEROQUEL) 50 MG tablet     senna-docusate (SENOKOT-S/PERICOLACE) 8.6-50 MG tablet     No current facility-administered medications for this visit.              Review of Systems:   A focused musculoskeletal and neurologic ROS was performed with pertinent positives and negatives noted in the HPI.  Additional systems were also  "reviewed and are documented at the bottom of the note.         Physical Exam:   Vitals: Ht 1.727 m (5' 8\")   Wt 73.5 kg (162 lb 1.6 oz)   BMI 24.65 kg/m    Musculoskeletal, Neurologic, and Spine:     The exam is somewhat limited by pain.  He has a markedly positive straight leg raise.  He has difficulty moving the leg.  I think he is roughly 4 out of 5 essentially in all distributions, but it is difficult for him with this level of pain.  He has a feeling of numbness on the left S1 distribution particular in the plantar foot.  His incision is healing well.         Imaging:   We ordered and independently reviewed new radiographs at this clinic visit. The results were discussed with the patient. Findings include:     I again reviewed his postoperative MRI which shows some scarring in the lateral recess at L5-S1.  I did not see an obvious new disc herniation there.  This MRI is about a week old and is before the most recent fall but is after the second surgery       Assessment and Plan:     40 year old male with now worsening leg pain after revision discectomy with ongoing numbness.    Certainly he is not progressing as we would have expected.  I did obtain an MRI and it looks like there is some scarring in the lateral recess there, but I do not see an obvious large new disc fragment that I think would make a revision surgery worth it at this time.  I told him at this point the only thing I could think to do would be an interbody fusion where we remove the entire facet joints and I was able to remove the majority of the disc, but I am not sure that that would make him better so it is hard to push for that at this time because it is a major operation with an uncertain outcome.  I would like him to try a higher dose steroid Dosepak and see if that will help calm things down at all.  I will continue to follow him closely.       Respectfully,  Dmitri Waldrop MD  Spine Surgery  [facility]    "

## 2020-06-16 NOTE — NURSING NOTE
"Reason For Visit:   Chief Complaint   Patient presents with     RECHECK     follow up on patients symptoms        Primary MD: No Ref-Primary, Physician  Ref. MD: Self     ?  No  Date of surgery: Dr. Waldrop   Type of surgery: Dr. Waldrop .  Smoker: No  Request smoking cessation information: No    Ht 1.727 m (5' 8\")   Wt 73.5 kg (162 lb 1.6 oz)   BMI 24.65 kg/m           Oswestry (ANJANA) Questionnaire    OSWESTRY DISABILITY INDEX 6/1/2020   Count 10   Sum 34   Oswestry Score (%) 68            Neck Disability Index (NDI) Questionnaire    No flowsheet data found.                Promis 10 Assessment    PROMIS 10 6/9/2020   In general, would you say your health is: Poor   In general, would you say your quality of life is: Fair   In general, how would you rate your physical health? Poor   In general, how would you rate your mental health, including your mood and your ability to think? Fair   In general, how would you rate your satisfaction with your social activities and relationships? Good   In general, please rate how well you carry out your usual social activities and roles Poor   To what extent are you able to carry out your everyday physical activities such as walking, climbing stairs, carrying groceries, or moving a chair? Not at all   How often have you been bothered by emotional problems such as feeling anxious, depressed or irritable? Always   How would you rate your fatigue on average? Very severe   How would you rate your pain on average?   0 = No Pain  to  10 = Worst Imaginable Pain 8   In general, would you say your health is: 1   In general, would you say your quality of life is: 2   In general, how would you rate your physical health? 1   In general, how would you rate your mental health, including your mood and your ability to think? 2   In general, how would you rate your satisfaction with your social activities and relationships? 3   In general, please rate how well you carry out your usual " social activities and roles. (This includes activities at home, at work and in your community, and responsibilities as a parent, child, spouse, employee, friend, etc.) 1   To what extent are you able to carry out your everyday physical activities such as walking, climbing stairs, carrying groceries, or moving a chair? 1   In the past 7 days, how often have you been bothered by emotional problems such as feeling anxious, depressed, or irritable? 5   In the past 7 days, how would you rate your fatigue on average? 5   In the past 7 days, how would you rate your pain on average, where 0 means no pain, and 10 means worst imaginable pain? 8   Global Mental Health Score 8   Global Physical Health Score 5   PROMIS TOTAL - SUBSCORES 13                Arron Chopra, ATC

## 2020-06-18 NOTE — PROGRESS NOTES
Spine Surgery Return Clinic Visit      Chief Complaint:   RECHECK (follow up on patients symptoms )      Interval HPI:  Symptom Profile Including: location of symptoms, onset, severity, exacerbating/alleviating factors, previous treatments:        Ortega Burroughs is a 40 year old male who returns again today status post revision discectomy.  He did well for about 2 or 3 days after the surgery then had a recurrence of pain.  First it was just numbness, but this past Thursday he had a fall where he felt like the leg gave out on him and now his pain is again much more severe so we have had him come in again for early follow-up.  I did see him last week at the time of the numbness complaint and certainly his pain is much worse today.  He is really uncomfortable in the chair today.            Past Medical History:     Past Medical History:   Diagnosis Date     Anxiety      Arthritis      Lumbar radiculopathy             Past Surgical History:     Past Surgical History:   Procedure Laterality Date     APPENDECTOMY       CHOLECYSTECTOMY  2009     DISCECTOMY LUMBAR POSTERIOR MICROSCOPIC ONE LEVEL Right 3/9/2020    Procedure: Right Lumbar 5 to Sacral 1 microdiscectomy;  Surgeon: Dmitri Waldrop MD;  [facility]     DISCECTOMY LUMBAR POSTERIOR MICROSCOPIC ONE LEVEL Right 6/4/2020    Procedure: Revision Right Lumbar 5-Sacral 1 Microdiscectomy;  Surgeon: Dmitri Waldrop MD;  [facility] OR     HERNIA REPAIR  2005     KNEE SURGERY      3 on left knee, 1 on right knee (arthroscopic)            Social History:     Social History     Tobacco Use     Smoking status: Current Every Day Smoker     Packs/day: 0.50     Years: 25.00     Pack years: 12.50     Smokeless tobacco: Current User     Types: Chew   Substance Use Topics     Alcohol use: Not Currently     Frequency: Monthly or less            Family History:     Family History   Problem Relation Age of Onset     Chronic Obstructive Pulmonary Disease  "Mother      Hypertension Father             Allergies:     Allergies   Allergen Reactions     Metoclopramide Itching, Anaphylaxis, Cramps and Rash     Bupropion      Other reaction(s): IRREGULAR HEART RATE     Diclofenac GI Disturbance     Other reaction(s): Indigestion     Gabapentin      Other reaction(s): Depression, Depressive disorder     Meloxicam GI Disturbance            Medications:     Current Outpatient Medications   Medication     predniSONE (DELTASONE) 20 MG tablet     diazepam (VALIUM) 5 MG tablet     docusate sodium (COLACE) 100 MG capsule     HYDROcodone-acetaminophen (NORCO) 5-325 MG tablet     Lidocaine (LIDOCARE) 4 % Patch     medical cannabis (Patient's own supply)     methylPREDNISolone (MEDROL DOSEPAK) 4 MG tablet therapy pack     omeprazole (PRILOSEC) 20 MG DR capsule     ondansetron (ZOFRAN-ODT) 4 MG ODT tab     polyethylene glycol (MIRALAX) 17 g packet     pregabalin (LYRICA) 100 MG capsule     QUEtiapine (SEROQUEL) 50 MG tablet     senna-docusate (SENOKOT-S/PERICOLACE) 8.6-50 MG tablet     No current facility-administered medications for this visit.              Review of Systems:   A focused musculoskeletal and neurologic ROS was performed with pertinent positives and negatives noted in the HPI.  Additional systems were also reviewed and are documented at the bottom of the note.         Physical Exam:   Vitals: Ht 1.727 m (5' 8\")   Wt 73.5 kg (162 lb 1.6 oz)   BMI 24.65 kg/m    Musculoskeletal, Neurologic, and Spine:     The exam is somewhat limited by pain.  He has a markedly positive straight leg raise.  He has difficulty moving the leg.  I think he is roughly 4 out of 5 essentially in all distributions, but it is difficult for him with this level of pain.  He has a feeling of numbness on the left S1 distribution particular in the plantar foot.  His incision is healing well.         Imaging:   We ordered and independently reviewed new radiographs at this clinic visit. The results were " discussed with the patient. Findings include:     I again reviewed his postoperative MRI which shows some scarring in the lateral recess at L5-S1.  I did not see an obvious new disc herniation there.  This MRI is about a week old and is before the most recent fall but is after the second surgery       Assessment and Plan:     40 year old male with now worsening leg pain after revision discectomy with ongoing numbness.    Certainly he is not progressing as we would have expected.  I did obtain an MRI and it looks like there is some scarring in the lateral recess there, but I do not see an obvious large new disc fragment that I think would make a revision surgery worth it at this time.  I told him at this point the only thing I could think to do would be an interbody fusion where we remove the entire facet joints and I was able to remove the majority of the disc, but I am not sure that that would make him better so it is hard to push for that at this time because it is a major operation with an uncertain outcome.  I would like him to try a higher dose steroid Dosepak and see if that will help calm things down at all.  I will continue to follow him closely.           Respectfully,  Dmitri Waldrop MD  Spine Surgery  [facility]

## 2020-06-19 ENCOUNTER — OFFICE VISIT (OUTPATIENT)
Dept: ORTHOPEDICS | Facility: CLINIC | Age: 40
End: 2020-06-19
Payer: COMMERCIAL

## 2020-06-19 DIAGNOSIS — M54.16 LUMBAR RADICULOPATHY: Primary | ICD-10-CM

## 2020-06-19 NOTE — PROGRESS NOTES
"Ortega Burroughs is a 40 year old male who is being evaluated via a billable telephone visit.      The patient has been notified of following:     \"This telephone visit will be conducted via a call between you and your physician/provider. We have found that certain health care needs can be provided without the need for a physical exam.  This service lets us provide the care you need with a short phone conversation.  If a prescription is necessary we can send it directly to your pharmacy.  If lab work is needed we can place an order for that and you can then stop by our lab to have the test done at a later time.    Telephone visits are billed at different rates depending on your insurance coverage. During this emergency period, for some insurers they may be billed the same as an in-person visit.  Please reach out to your insurance provider with any questions.    If during the course of the call the physician/provider feels a telephone visit is not appropriate, you will not be charged for this service.\"    Patient has given verbal consent for Telephone visit?  Yes    What phone number would you like to be contacted at? Home    How would you like to obtain your AVS? Zev    I followed up with Ortega today by phone.  Leg is basically unchanged.  He is able to find a position of comfort if he flexes his knees up to his chest.  Otherwise he still having significant pain in the leg.  He is walking with a cane.  When I saw him on Tuesday he was basically 4 out of 5 strength all throughout the leg with a significantly positive straight leg raise and we started him on prednisone and he started this on Wednesday but it has not made a ton of difference for him.    I did review the result of his CT scan.  I do not have the images, but the report is available to me from the [facility], and it shows results similar to the MRI with a broad disc bulge at L5-S1 contributing to lateral recess stenosis at L5-S1.    I " again reviewed his MRI which shows some scarring and a broad disc bulge at L5-S1 contributing to lateral recess stenosis.    I told him I am very torn about what to recommend to him.  Based on the findings of his most recent attempted a discectomy, I do not think that a revision discectomy at this time would be possible.  We would probably have to attempt a fusion where I tried to resect that broad disc bulge and remove the bulk of the remaining disc with an interbody fusion through that foramen in the form of a TLIF.  Certainly the main risk of this surgery would be either risk of nerve injury or risk that things would not get better.  I am concerned because at the time of the most recent discectomy, there really was quite a lot of scar tissue but not much in the way of a loose disc fragment.  There is a chance that removing that disc bulge would therefore not improve his symptoms as it may not be possible to remove all of the scar tissue itself right off of the nerve.  So I am hesitant to pull the trigger on this and it would certainly be a major surgery.    The flip side is he is really quite miserable not even sleeping an hour or 2.  I would like to see how things go over the weekend, and give the steroids a little more time to work.  I will follow-up with him virtually on Monday and I also told him we would start looking for surgery dates to try to hold some time for him if possible.    Phone call duration: 7 minutes    Dmitri Waldrop MD    ADDENDUM: I called the patient on Monday 6/22 as followup and he continues to be severely disabled, barely able to mobilize.  I am a bit at a loss to explain his symptoms and course, although it is clear there are some findings on his MRI.  I did review his imaging with my partner Dr. Escobar.  Both he and I agreed together that there was no likely surgical option other than a TLIF.  The advantage of this may be that it would allow a more complete resection of the  disc ventral to the nerve root.  I could resect the annulus and the entire disc bulge for example.  However, both Dr. Escobar and I agree that the clinical benefit would be uncertain.  In other words, it is not clear that resecting this ventral material would alleviate his symptoms.  The difficulty is that it is a large surgery with a substantial recovery, and uncertain benefit.  This has made me want it to be a last resort for him.  There is even a chance it could make him worse, particularly if there is a nerve root injury dissecting through the scar or if there is a spinal fluid leak for example.  However, in talking with the patient multiple times at this point, I am convinced that he is not improving, and he cannot mobilize.  He has severe pain with diffuse weakness in the leg.  Given this, and the fact that there are no other non-operative options, we agreed to proceed with scheduling a TLIF for him with a plan for ICAG and stealth navigation.  I talked to him on the phone today about risks and he wishes to proceed.    Dmitri Waldrop MD

## 2020-06-19 NOTE — LETTER
6/19/2020     RE: Ortega Burroughs  [address] [place] [address]    Dear Colleague,    Thank you for referring your patient, Ortega Burroughs, to the [facility]. Please see a copy of my visit note below.    Ortega Burroughs is a 40 year old male who is being evaluated via a billable telephone visit.      I followed up with Ortega today by phone.  Leg is basically unchanged.  He is able to find a position of comfort if he flexes his knees up to his chest.  Otherwise he still having significant pain in the leg.  He is walking with a cane.  When I saw him on Tuesday he was basically 4 out of 5 strength all throughout the leg with a significantly positive straight leg raise and we started him on prednisone and he started this on Wednesday but it has not made a ton of difference for him.    I did review the result of his CT scan.  I do not have the images, but the report is available to me from the [facility], and it shows results similar to the MRI with a broad disc bulge at L5-S1 contributing to lateral recess stenosis at L5-S1.    I again reviewed his MRI which shows some scarring and a broad disc bulge at L5-S1 contributing to lateral recess stenosis.    I told him I am very torn about what to recommend to him.  Based on the findings of his most recent attempted a discectomy, I do not think that a revision discectomy at this time would be possible.  We would probably have to attempt a fusion where I tried to resect that broad disc bulge and remove the bulk of the remaining disc with an interbody fusion through that foramen in the form of a TLIF.  Certainly the main risk of this surgery would be either risk of nerve injury or risk that things would not get better.  I am concerned because at the time of the most recent discectomy, there really was quite a lot of scar tissue but not much in the way of a loose disc fragment.  There is a chance that removing that disc  bulge would therefore not improve his symptoms as it may not be possible to remove all of the scar tissue itself right off of the nerve.  So I am hesitant to pull the trigger on this and it would certainly be a major surgery.    The flip side is he is really quite miserable not even sleeping an hour or 2.  I would like to see how things go over the weekend, and give the steroids a little more time to work.  I will follow-up with him virtually on Monday and I also told him we would start looking for surgery dates to try to hold some time for him if possible.    Phone call duration: 7 minutes    Dmitri Waldrop MD    ADDENDUM: I called the patient on Monday 6/22 as followup and he continues to be severely disabled, barely able to mobilize.  I am a bit at a loss to explain his symptoms and course, although it is clear there are some findings on his MRI.  I did review his imaging with my partner Dr. Escobar.  Both he and I agreed together that there was no likely surgical option other than a TLIF.  The advantage of this may be that it would allow a more complete resection of the disc ventral to the nerve root.  I could resect the annulus and the entire disc bulge for example.  However, both Dr. Escobar and I agree that the clinical benefit would be uncertain.  In other words, it is not clear that resecting this ventral material would alleviate his symptoms.  The difficulty is that it is a large surgery with a substantial recovery, and uncertain benefit.  This has made me want it to be a last resort for him.  There is even a chance it could make him worse, particularly if there is a nerve root injury dissecting through the scar or if there is a spinal fluid leak for example.  However, in talking with the patient multiple times at this point, I am convinced that he is not improving, and he cannot mobilize.  He has severe pain with diffuse weakness in the leg.  Given this, and the fact that there are no other  non-operative options, we agreed to proceed with scheduling a TLIF for him with a plan for ICAG and stealth navigation.  I talked to him on the phone today about risks and he wishes to proceed.    Dmitri Waldrop MD

## 2020-06-22 DIAGNOSIS — M51.16 LUMBAR DISC HERNIATION WITH RADICULOPATHY: ICD-10-CM

## 2020-06-22 RX ORDER — HYDROCODONE BITARTRATE AND ACETAMINOPHEN 5; 325 MG/1; MG/1
1-2 TABLET ORAL EVERY 4 HOURS PRN
Qty: 50 TABLET | Refills: 0 | Status: ON HOLD | OUTPATIENT
Start: 2020-06-22 | End: 2020-06-30

## 2020-06-22 RX ORDER — DIAZEPAM 5 MG
5 TABLET ORAL EVERY 6 HOURS PRN
Qty: 20 TABLET | Refills: 0 | Status: ON HOLD | OUTPATIENT
Start: 2020-06-22 | End: 2020-06-30

## 2020-06-22 RX ORDER — AMOXICILLIN 250 MG
1-2 CAPSULE ORAL 2 TIMES DAILY
Qty: 30 TABLET | Refills: 0 | Status: ON HOLD | OUTPATIENT
Start: 2020-06-22 | End: 2020-06-29

## 2020-06-23 ENCOUNTER — TELEPHONE (OUTPATIENT)
Dept: ORTHOPEDICS | Facility: CLINIC | Age: 40
End: 2020-06-23

## 2020-06-23 ENCOUNTER — MYC MEDICAL ADVICE (OUTPATIENT)
Dept: ORTHOPEDICS | Facility: CLINIC | Age: 40
End: 2020-06-23

## 2020-06-23 DIAGNOSIS — G89.18 POST-OP PAIN: Primary | ICD-10-CM

## 2020-06-23 DIAGNOSIS — Z11.59 ENCOUNTER FOR SCREENING FOR OTHER VIRAL DISEASES: Primary | ICD-10-CM

## 2020-06-23 PROBLEM — M54.16 LUMBAR RADICULOPATHY: Status: ACTIVE | Noted: 2020-06-23

## 2020-06-23 RX ORDER — PREGABALIN 200 MG/1
200 CAPSULE ORAL 3 TIMES DAILY
COMMUNITY

## 2020-06-23 RX ORDER — DIVALPROEX SODIUM 250 MG/1
250 TABLET, EXTENDED RELEASE ORAL 3 TIMES DAILY
Status: ON HOLD | COMMUNITY
End: 2020-06-26

## 2020-06-23 RX ORDER — QUETIAPINE FUMARATE 100 MG/1
50 TABLET, FILM COATED ORAL AT BEDTIME
COMMUNITY

## 2020-06-23 NOTE — PROGRESS NOTES
Preoperative Assessment Center Medication History Note    Medication history completed via phone call on June 23, 2020 by this writer. See Epic admission navigator for prior to admission medications. Operating room staff will still need to confirm medications and last dose information on day of surgery.     Medication history interview sources:  patient, payor information, care everywhere.     Changes made to PTA medication list (reason)  Added: depakote ER,   Deleted: medrol dose scott, prednisone,   Changed: lyrica, seroquel, omeprazole sig, miralax sig,     Additional medication history information (including reliability of information, actions taken by pharmacist):  -- patient is on daily opioids but total oral morphine equivalent per day is <5   -- No recent (within 30 days) course of antibiotics  -- Just completed course of prednisone yesterday.   -- Patient declines being on any other prescription or over-the-counter medications    Prior to Admission medications    Medication Sig Last Dose Taking? Auth Provider   diazepam (VALIUM) 5 MG tablet Take 1 tablet (5 mg) by mouth every 6 hours as needed for muscle spasms or pain Taking Yes Jami Torres PA-C   divalproex sodium extended-release (DEPAKOTE ER) 250 MG 24 hr tablet Take 250 mg by mouth 3 times daily Taking Yes Unknown, Entered By History   docusate sodium (COLACE) 100 MG capsule Take 1 capsule (100 mg) by mouth 2 times daily Taking Yes Shukri Murray PA-C   HYDROcodone-acetaminophen (NORCO) 5-325 MG tablet Take 1-2 tablets by mouth every 4 hours as needed for moderate to severe pain Taking Yes Jami Torres PA-C   Lidocaine (LIDOCARE) 4 % Patch Place 1 patch onto the skin daily To prevent lidocaine toxicity, patient should be patch free for 12 hrs daily.  Taking Yes Reported, Patient   medical cannabis (Patient's own supply) 1 Dose by Other route See Admin Instructions (The purpose of this order is to document that the patient reports  taking medical cannabis.  This is not a prescription, and is not used to certify that the patient has a qualifying medical condition.)     Edible marijuana oil - 1 teaspoon twice daily   From [place] state program. Taking Yes Reported, Patient   omeprazole (PRILOSEC) 20 MG DR capsule Take 20 mg by mouth 2 times daily as needed  Taking Yes Reported, Patient   ondansetron (ZOFRAN-ODT) 4 MG ODT tab Take 1-2 tablets (4-8 mg) by mouth every 8 hours as needed for nausea Taking Yes Shukri Murray PA-C   polyethylene glycol (MIRALAX) 17 g packet Take 17 g by mouth daily  Patient taking differently: Take 1 packet by mouth daily as needed  Taking Yes Shukri Murray PA-C   Pregabalin (LYRICA) 200 MG capsule Take 200 mg by mouth 3 times daily Taking Yes Unknown, Entered By History   QUEtiapine (SEROQUEL) 100 MG tablet Take 50 mg by mouth At Bedtime Taking Yes Unknown, Entered By History   senna-docusate (SENOKOT-S/PERICOLACE) 8.6-50 MG tablet Take 1-2 tablets by mouth 2 times daily  Patient not taking: Reported on 6/23/2020 Not Taking  Jami Torres PA-C         Medication history completed by: Jagdish Maurice [facility]

## 2020-06-23 NOTE — TELEPHONE ENCOUNTER
Patient is scheduled for surgery with Dr. Waldrop    Spoke or left message with: Patient    Date of Surgery: 6/26/20    Location: [place]    Post op: 6 weeks & 3 months    Pre-op with surgeon (if applicable): Complete    H&P: Scheduled with PAC 6/24/20    Additional imaging/appointments: N/A    Surgery packet: N/A     Additional comments: Patient scheduled for COVID swab on 6/24/20 following PAC appt.

## 2020-06-23 NOTE — TELEPHONE ENCOUNTER
FUTURE VISIT INFORMATION      SURGERY INFORMATION:    Date: 6.26.2020    Location: [facility] OR    Surgeon:  Dr. Waldrop    Anesthesia Type:  General    Procedure: Lumbar 5 to Sacral 1 instrumented posterior interbody fusion and mchugh peters osteotomy with O-Arm/Stealth Navigation, use of allograft    Consult: 6.19.2020

## 2020-06-24 ENCOUNTER — OFFICE VISIT (OUTPATIENT)
Dept: SURGERY | Facility: CLINIC | Age: 40
End: 2020-06-24
Payer: COMMERCIAL

## 2020-06-24 ENCOUNTER — PRE VISIT (OUTPATIENT)
Dept: SURGERY | Facility: CLINIC | Age: 40
End: 2020-06-24

## 2020-06-24 ENCOUNTER — ANESTHESIA EVENT (OUTPATIENT)
Dept: SURGERY | Facility: CLINIC | Age: 40
DRG: 455 | End: 2020-06-24
Payer: COMMERCIAL

## 2020-06-24 VITALS
HEIGHT: 68 IN | WEIGHT: 165 LBS | HEART RATE: 88 BPM | DIASTOLIC BLOOD PRESSURE: 97 MMHG | SYSTOLIC BLOOD PRESSURE: 132 MMHG | RESPIRATION RATE: 12 BRPM | TEMPERATURE: 98 F | BODY MASS INDEX: 25.01 KG/M2 | OXYGEN SATURATION: 96 %

## 2020-06-24 DIAGNOSIS — Z01.818 PRE-OP EXAMINATION: ICD-10-CM

## 2020-06-24 DIAGNOSIS — Z01.818 PREOP EXAMINATION: Primary | ICD-10-CM

## 2020-06-24 DIAGNOSIS — M54.16 LUMBAR RADICULOPATHY: ICD-10-CM

## 2020-06-24 DIAGNOSIS — M54.16 LUMBAR RADICULOPATHY: Primary | ICD-10-CM

## 2020-06-24 DIAGNOSIS — Z11.59 ENCOUNTER FOR SCREENING FOR OTHER VIRAL DISEASES: ICD-10-CM

## 2020-06-24 LAB
ABO + RH BLD: NORMAL
ABO + RH BLD: NORMAL
ALBUMIN SERPL-MCNC: 3.8 G/DL (ref 3.4–5)
ALP SERPL-CCNC: 84 U/L (ref 40–150)
ALT SERPL W P-5'-P-CCNC: 21 U/L (ref 0–70)
ANION GAP SERPL CALCULATED.3IONS-SCNC: 4 MMOL/L (ref 3–14)
AST SERPL W P-5'-P-CCNC: 15 U/L (ref 0–45)
BASOPHILS # BLD AUTO: 0 10E9/L (ref 0–0.2)
BASOPHILS NFR BLD AUTO: 0.6 %
BILIRUB SERPL-MCNC: 0.3 MG/DL (ref 0.2–1.3)
BLD GP AB SCN SERPL QL: NORMAL
BLOOD BANK CMNT PATIENT-IMP: NORMAL
BLOOD BANK CMNT PATIENT-IMP: NORMAL
BUN SERPL-MCNC: 17 MG/DL (ref 7–30)
CALCIUM SERPL-MCNC: 8.7 MG/DL (ref 8.5–10.1)
CHLORIDE SERPL-SCNC: 109 MMOL/L (ref 94–109)
CO2 SERPL-SCNC: 25 MMOL/L (ref 20–32)
CREAT SERPL-MCNC: 1.08 MG/DL (ref 0.66–1.25)
DIFFERENTIAL METHOD BLD: NORMAL
EOSINOPHIL # BLD AUTO: 0.5 10E9/L (ref 0–0.7)
EOSINOPHIL NFR BLD AUTO: 6.3 %
ERYTHROCYTE [DISTWIDTH] IN BLOOD BY AUTOMATED COUNT: 11.8 % (ref 10–15)
GFR SERPL CREATININE-BSD FRML MDRD: 84 ML/MIN/{1.73_M2}
GLUCOSE SERPL-MCNC: 81 MG/DL (ref 70–99)
HBA1C MFR BLD: 5 % (ref 0–5.6)
HCT VFR BLD AUTO: 47.9 % (ref 40–53)
HGB BLD-MCNC: 15.9 G/DL (ref 13.3–17.7)
IMM GRANULOCYTES # BLD: 0 10E9/L (ref 0–0.4)
IMM GRANULOCYTES NFR BLD: 0.3 %
LYMPHOCYTES # BLD AUTO: 2.1 10E9/L (ref 0.8–5.3)
LYMPHOCYTES NFR BLD AUTO: 28.5 %
MCH RBC QN AUTO: 31.1 PG (ref 26.5–33)
MCHC RBC AUTO-ENTMCNC: 33.2 G/DL (ref 31.5–36.5)
MCV RBC AUTO: 94 FL (ref 78–100)
MONOCYTES # BLD AUTO: 0.5 10E9/L (ref 0–1.3)
MONOCYTES NFR BLD AUTO: 6.8 %
NEUTROPHILS # BLD AUTO: 4.2 10E9/L (ref 1.6–8.3)
NEUTROPHILS NFR BLD AUTO: 57.5 %
NRBC # BLD AUTO: 0 10*3/UL
NRBC BLD AUTO-RTO: 0 /100
PLATELET # BLD AUTO: 199 10E9/L (ref 150–450)
POTASSIUM SERPL-SCNC: 4.3 MMOL/L (ref 3.4–5.3)
PROT SERPL-MCNC: 7.5 G/DL (ref 6.8–8.8)
RBC # BLD AUTO: 5.11 10E12/L (ref 4.4–5.9)
SARS-COV-2 RNA SPEC QL NAA+PROBE: NOT DETECTED
SODIUM SERPL-SCNC: 138 MMOL/L (ref 133–144)
SPECIMEN EXP DATE BLD: NORMAL
SPECIMEN SOURCE: NORMAL
WBC # BLD AUTO: 7.2 10E9/L (ref 4–11)

## 2020-06-24 ASSESSMENT — MIFFLIN-ST. JEOR: SCORE: 1622.94

## 2020-06-24 ASSESSMENT — PAIN SCALES - GENERAL: PAINLEVEL: SEVERE PAIN (7)

## 2020-06-24 ASSESSMENT — LIFESTYLE VARIABLES: TOBACCO_USE: 1

## 2020-06-24 NOTE — ANESTHESIA PREPROCEDURE EVALUATION
"Anesthesia Pre-Procedure Evaluation    Patient: Ortega Burroughs   MRN:     [id number] Gender:   male   Age:    40 year old :      1980        Preoperative Diagnosis: * No surgery found *        LABS:  CBC:   Lab Results   Component Value Date    WBC 9.7 2020    WBC 13.5 (H) 2020    HGB 15.0 2020    HGB 14.1 2020    HCT 41.9 2020    HCT 48.7 2020     (L) 2020     2020     BMP:   Lab Results   Component Value Date     2020    POTASSIUM 4.6 2020    CHLORIDE 110 (H) 2020    CO2 21 2020    BUN 21 2020    CR 1.26 (H) 2020    GLC 84 2020    GLC 88 2020     COAGS: No results found for: PTT, INR, FIBR  POC:   Lab Results   Component Value Date     (H) 2020     OTHER:   Lab Results   Component Value Date    MIIHR 9.8 2020        Preop Vitals    BP Readings from Last 3 Encounters:   20 (!) 132/97   20 134/84   20 116/70    Pulse Readings from Last 3 Encounters:   20 88   20 94   20 91      Resp Readings from Last 3 Encounters:   20 12   20 16   20 16    SpO2 Readings from Last 3 Encounters:   20 96%   20 (P) 98%   20 98%      Temp Readings from Last 1 Encounters:   20 98  F (36.7  C) (Oral)    Ht Readings from Last 1 Encounters:   20 1.727 m (5' 8\")      Wt Readings from Last 1 Encounters:   20 74.8 kg (165 lb)    Estimated body mass index is 25.09 kg/m  as calculated from the following:    Height as of this encounter: 1.727 m (5' 8\").    Weight as of this encounter: 74.8 kg (165 lb).     LDA:        Past Medical History:   Diagnosis Date     Anxiety      Arthritis      GERD (gastroesophageal reflux disease)      Lumbar radiculopathy      PONV (postoperative nausea and vomiting)       Past Surgical History:   Procedure Laterality Date     APPENDECTOMY       CHOLECYSTECTOMY  2009     DISCECTOMY " LUMBAR POSTERIOR MICROSCOPIC ONE LEVEL Right 3/9/2020    Procedure: Right Lumbar 5 to Sacral 1 microdiscectomy;  Surgeon: Dmitri Waldrop MD;  [facility]     DISCECTOMY LUMBAR POSTERIOR MICROSCOPIC ONE LEVEL Right 6/4/2020    Procedure: Revision Right Lumbar 5-Sacral 1 Microdiscectomy;  Surgeon: Dmitri Waldrop MD;  Location: [facility] OR     HERNIA REPAIR  2005     KNEE SURGERY      3 on left knee, 1 on right knee (arthroscopic)      Allergies   Allergen Reactions     Metoclopramide Itching, Anaphylaxis, Cramps and Rash     Bupropion      Other reaction(s): IRREGULAR HEART RATE     Diclofenac GI Disturbance     Other reaction(s): Indigestion     Gabapentin      Other reaction(s): Depression, Depressive disorder     Meloxicam GI Disturbance        Anesthesia Evaluation     . Pt has had prior anesthetic. Type: General    History of anesthetic complications   - PONV  Most recent back surgeries, he has not had PONV with PONV treatment protocol.       ROS/MED HX    ENT/Pulmonary:     (+)DORIS risk factors tobacco use (Smoked last 25 years, 1-2 PPD. ), Current use , . .    Neurologic: Comment: Lumbar radiculopathy with worsening right buttocks and right leg.     Reports evaluated for a seizure type event last October 2019.  Was referred to neurology and reports that he does not have a seizure disorder.  He discontinued his Depakote about two weeks ago.       Cardiovascular:  - neg cardiovascular ROS   (+) ----. : . . . :. . No previous cardiac testing       METS/Exercise Tolerance: Comment: METS <4 but were >4 after his back surgery in March 2020.     Hematologic:  - neg hematologic  ROS       Musculoskeletal: Comment: Left and right meniscus surgery in the past.  Left meniscus is torn again.         GI/Hepatic: Comment: Right sided inguinal hernia s/p repair.     (+) GERD Asymptomatic on medication, appendicitis (s/p appendectomy. ), cholecystitis/cholelithiasis (s/p cholecystectomy ),        Renal/Genitourinary:     (+) Nephrolithiasis  (No recent issues. ),       Endo: Comment: Last steroid dose pack 6/17/20.        Psychiatric:     (+) psychiatric history anxiety and other (comment) (PTSD - veteren )      Infectious Disease:  - neg infectious disease ROS       Malignancy:      - no malignancy   Other:    (+) No chance of pregnancy C-spine cleared: Yes, H/O Chronic Pain,H/O chronic opiod use , other significant disability Other (comment)                       PHYSICAL EXAM:   Mental Status/Neuro: A/A/O   Airway: Facies: Feasible  Mallampati: I  Mouth/Opening: Full  TM distance: > 6 cm  Neck ROM: Full   Respiratory: Auscultation: CTAB     Resp. Rate: Normal     Resp. Effort: Normal      CV: Rhythm: Regular  Rate: Age appropriate  Heart: Normal Sounds  Edema: None   Comments:      Dental: Normal Dentition                Assessment:   ASA SCORE: 2    H&P: History and physical reviewed and following examination; no interval change.    NPO Status: NPO Appropriate     Plan:   Anes. Type:  General   Pre-Medication: None   Induction:  IV (Standard)   Airway: ETT; Oral   Access/Monitoring: PIV; 2nd PIV (Will discuss arterial line with surgeon but unlikely given single level)   Maintenance: Balanced (Propofol infusion background given PONV)     Drips/Meds: Sufentanil (Lidocaine)     Postop Plan:   Postop Pain: Opioids  Postop Sedation/Airway: Not planned  Disposition: Inpatient/Admit     PONV Management:   Adult Risk Factors:, H/o PONV or Motion Sickness, Postop Opioids   Prevention: Ondansetron, Dexamethasone, Propofol     CONSENT: Direct conversation   Plan and risks discussed with: Patient   Blood Products: Consented (ALL Blood Products)                PAC Discussion and Assessment    ASA Classification: 3  Case is suitable for: [place]  Anesthetic techniques and relevant risks discussed: GA  Invasive monitoring and risk discussed:   Types:   Possibility and Risk of blood transfusion discussed:   NPO  "instructions given:   Additional anesthetic preparation and risks discussed:   Needs early admission to pre-op area:   Other:     PAC Resident/NP Anesthesia Assessment:  Ortega Burroughs is a 40-year-old male scheduled for Lumbar 5 to Sacral 1 instrumented posterior interbody fusion and mchugh peters osteotomy with O-Arm/Stealth Navigation, use of allograft, local autograft, and possible iliac crest autograft on 6/26/20 with Dr. Waldrop at [facility] under general anesthesia.     Mr. Burroughs has a past medical history significant for lumbar radiculopathy and is status post  right Lumbar 5 to Sacral 1 microdiscectomy on 3/9/2020 by Dr. Waldrop.  Initially he did well until about 7 weeks post-op when he developed a recurrence of right S1 distribution radiculopathy with severe pain in his buttocks and numbness radiating down onto the plantar aspect and lateral aspect of his right foot. He followed up with Dr. Waldrop on 6/2/20 and despite physical therapy exercises, steroid injection, steroid dose pack and gabapentin he continues to have pain.  He is now severely disabled, barely able to mobilize, and can only sleep 1-2 hours at night time.  Through the evaluation and imaging, Dr. Waldrop recommended the above procedure for Mr. Burroughs has right S1 distribution radiculopathy and recurrent L5-S1 disc herniation.  Dr. Waldrop counseled the patient regarding the above procedure and per Dr. Waldrop's  note on 6/19/20 regarding his telephone call with Mr. Summers the same day, Dr. Waldrop was clear that, \"The difficulty is that it is a large surgery with a substantial recovery, and uncertain benefit\".   Mr. Burroughs has opted to proceed.     Additional past medical history significant for:  PONV, GERD, tobacco dependence, chronic pain, anxiety with panic disorder and inguinal hernia s/p repair.     Dx: lumbar radiculopathy    PROCEDURES  MR LUMBAR SPINE W/O CONTRAST 6/9/2020 7:51 AM                                            "                       Impression: Postoperative changes of right hemilaminectomy and  microdiscectomy. Stable small residual right subarticular to foraminal  disc protrusion which abuts the exiting right S1 nerve root with  possible impingement. No significant change from MR 5/12/2020.     I have personally reviewed the examination and initial interpretation  and I agree with the findings.     HALEY JEFF MD     EXAM: XR LUMBAR SPINE PORT 1 VW  6/4/2020 11:44 AM       HISTORY: Right Revision L5-S1 Microdiscectomy     COMPARISON: 2/25/2020     FINDINGS: Lateral intraoperative radiograph of the lumbar spine. 5  lumbar type vertebral bodies presumed comment keeping with previous  numbering convention.     Single surgical instrument noted at the level of L5-S1 disc space.  Disc space loss L5-S1 similar to prior.                                                                       IMPRESSION: Intraoperative radiograph of the lumbar spine with  surgical marker at the level of L5-S1.        FARA (Bernard DELAROSA MD    He has the following specific operative considerations:   - DORIS # of risks 1/8 = low risk  - VTE risk:  0.5%  - Known PONV:  anti-emetic intervention recommended.      #  Cardiology   - Denies known coronary artery disease.  Denies cardiac symptoms.  - METS:  <4. RCRI : No serious cardiac risks.  0.4 % risk of major adverse cardiac event.           #  Pulmonary   - tobacco dependence, 25+ pack-years: Encouraged smoking cessation.  Encourage coughing/deep breathing.  LS clear on exam. Consider use of bronchodilators to optimize pulmonary function in the perioperative period.     #  GI -     - GERD, take PPI DOS       #  Musculoskeletal   - Lumbar radiculopathy s/p  right Lumbar 5 to Sacral 1 microdiscectomy on 3/9/2020 now with severe lumbar radiculopathy symptoms with limited mobility.  Above procedure planned. Recommend fall precautions.  Chronic Pain with morphine equivalent = 60 mg/hour.   Evaluated by PAC Pharm D.  Please see his note.Take Norco and pregabalin as prescribed.  Also uses only supple of cannabis.     #  Neuro   - Anxiety/panic disorder and PTSD, take diazepam as prescribed.   - Insomnia, take Seroquel as prescribed.       - Anesthesia considerations:  6/4/20 ETT Mask Ventilation: Easy; Ease of Intubation: Easy; Airway Size: 8;  Cuffed;  Oral;  Blade Type: Portillo;  Blade Size: 2;  Place by: KV;  Insertion Attempts: 1;  Secured at (cm)to lip: 23 cm;  Breath Sounds: Equal, clear and bilateral;  End Tidal CO2: Present;  Dentition: Intact, Unchanged;  Grade View of Cords: 2. On 3/9/20, C-Mac was used for teaching purposes per chart review.  Refer to PAC assessment in anesthesia records    - COVID testing arranged per surgeon     Arrival time, NPO, shower and medication instructions provided by nursing staff today.  Preparing For Your Surgery handout given.  Patient was discussed with Dr Umanzor.          Reviewed and Signed by PAC Mid-Level Provider/Resident  Mid-Level Provider/Resident: Teresa KENNY CNP  Date: 6/24/20  Time: 8:41    Attending Anesthesiologist Anesthesia Assessment:        Anesthesiologist:   Date:   Time:   Pass/Fail:   Disposition:     PAC Pharmacist Assessment:        Pharmacist:   Date:   Time:    EFRAÍN Donovan CNP

## 2020-06-24 NOTE — H&P
"  Pre-Operative H & P     CC:  Preoperative exam to assess for increased cardiopulmonary risk while undergoing surgery and anesthesia.    Date of Encounter: 6/24/2020  Primary Care Physician:  No Ref-Primary, Physician    HPI  Ortega Burroughs is a 40 year old male who presents for pre-operative H & P in preparation for Lumbar 5 to Sacral 1 instrumented posterior interbody fusion and mchugh peters osteotomy with O-Arm/Stealth Navigation, use of allograft, local autograft, and possible iliac crest autograft on 6/26/20 with Dr. Waldrop at [facility] under general anesthesia.     Mr. Burroughs has a past medical history significant for lumbar radiculopathy and is status post  right Lumbar 5 to Sacral 1 microdiscectomy on 3/9/2020 by Dr. Waldrop.  Initially he did well until about 7 weeks post-op when he developed a recurrence of right S1 distribution radiculopathy with severe pain in his buttocks and numbness radiating down onto the plantar aspect and lateral aspect of his right foot. He followed up with Dr. Waldrop on 6/2/20 and despite physical therapy exercises, steroid injection, steroid dose pack and gabapentin he continues to have pain.  He is now severely disabled, barely able to mobilize, and can only sleep 1-2 hours at night time.  Through the evaluation and imaging, Dr. Waldrop recommended the above procedure for Mr. Burroughs has right S1 distribution radiculopathy and recurrent L5-S1 disc herniation.  Dr. Waldrop counseled the patient regarding the above procedure and per Dr. Waldrop's  note on 6/19/20 regarding his telephone call with Mr. Burroughson the same day, Dr. Waldrop was clear that, \"The difficulty is that it is a large surgery with a substantial recovery, and uncertain benefit\".   Mr. Burroughs has opted to proceed.     Additional past medical history significant for:  PONV, GERD, tobacco dependence, chronic pain, anxiety, PTSD and inguinal hernia s/p repair.     Dx: lumbar radiculopathy     History is " obtained from the patient and electronic health record.     Past Medical History  Past Medical History:   Diagnosis Date     Anxiety      Arthritis      GERD (gastroesophageal reflux disease)      Lumbar radiculopathy      PONV (postoperative nausea and vomiting)        Past Surgical History  Past Surgical History:   Procedure Laterality Date     APPENDECTOMY       CHOLECYSTECTOMY  2009     DISCECTOMY LUMBAR POSTERIOR MICROSCOPIC ONE LEVEL Right 3/9/2020    Procedure: Right Lumbar 5 to Sacral 1 microdiscectomy;  Surgeon: Dmitri Waldrop MD;  Location: [facility] OR     DISCECTOMY LUMBAR POSTERIOR MICROSCOPIC ONE LEVEL Right 6/4/2020    Procedure: Revision Right Lumbar 5-Sacral 1 Microdiscectomy;  Surgeon: Dmitri Waldrop MD;  Location: [facility] OR     HERNIA REPAIR  2005     KNEE SURGERY      3 on left knee, 1 on right knee (arthroscopic)       Hx of Blood transfusions/reactions: denies     Hx of abnormal bleeding or anti-platelet use: denies     Steroid use in the last year: Steroid dose pack 6/17/20    Personal or FH with difficulty with Anesthesia:  PONV:  Most recent back surgeries, he has not had PONV with PONV treatment protocol.         Prior to Admission Medications  Current Outpatient Medications   Medication Sig Dispense Refill     diazepam (VALIUM) 5 MG tablet Take 1 tablet (5 mg) by mouth every 6 hours as needed for muscle spasms or pain 20 tablet 0     divalproex sodium extended-release (DEPAKOTE ER) 250 MG 24 hr tablet Take 250 mg by mouth 3 times daily       docusate sodium (COLACE) 100 MG capsule Take 1 capsule (100 mg) by mouth 2 times daily 20 capsule 0     HYDROcodone-acetaminophen (NORCO) 5-325 MG tablet Take 1-2 tablets by mouth every 4 hours as needed for moderate to severe pain 50 tablet 0     Lidocaine (LIDOCARE) 4 % Patch Place 1 patch onto the skin daily To prevent lidocaine toxicity, patient should be patch free for 12 hrs daily.        medical cannabis (Patient's own supply) 1  Dose by Other route See Admin Instructions (The purpose of this order is to document that the patient reports taking medical cannabis.  This is not a prescription, and is not used to certify that the patient has a qualifying medical condition.)     Edible marijuana oil - 1 teaspoon twice daily   From [place] state program.       omeprazole (PRILOSEC) 20 MG DR capsule Take 20 mg by mouth 2 times daily as needed        ondansetron (ZOFRAN-ODT) 4 MG ODT tab Take 1-2 tablets (4-8 mg) by mouth every 8 hours as needed for nausea 20 tablet 0     polyethylene glycol (MIRALAX) 17 g packet Take 17 g by mouth daily (Patient taking differently: Take 1 packet by mouth daily as needed ) 10 packet 0     Pregabalin (LYRICA) 200 MG capsule Take 200 mg by mouth 3 times daily       QUEtiapine (SEROQUEL) 100 MG tablet Take 50 mg by mouth At Bedtime       senna-docusate (SENOKOT-S/PERICOLACE) 8.6-50 MG tablet Take 1-2 tablets by mouth 2 times daily (Patient not taking: Reported on 6/23/2020) 30 tablet 0       Allergies  Allergies   Allergen Reactions     Metoclopramide Itching, Anaphylaxis, Cramps and Rash     Bupropion      Other reaction(s): IRREGULAR HEART RATE     Diclofenac GI Disturbance     Other reaction(s): Indigestion     Gabapentin      Other reaction(s): Depression, Depressive disorder     Meloxicam GI Disturbance       Social History  Social History     Socioeconomic History     Marital status: Single     Spouse name: Not on file     Number of children: Not on file     Years of education: Not on file     Highest education level: Not on file   Occupational History     Not on file   Social Needs     Financial resource strain: Not on file     Food insecurity     Worry: Not on file     Inability: Not on file     Transportation needs     Medical: Not on file     Non-medical: Not on file   Tobacco Use     Smoking status: Current Every Day Smoker     Packs/day: 0.50     Years: 25.00     Pack years: 12.50     Smokeless tobacco:  Current User     Types: Chew   Substance and Sexual Activity     Alcohol use: Not Currently     Frequency: Monthly or less     Drug use: Yes     Types: Marijuana     Comment: edibles for pain and anxiety     Sexual activity: Not on file   Lifestyle     Physical activity     Days per week: Not on file     Minutes per session: Not on file     Stress: Not on file   Relationships     Social connections     Talks on phone: Not on file     Gets together: Not on file     Attends Hoahaoism service: Not on file     Active member of club or organization: Not on file     Attends meetings of clubs or organizations: Not on file     Relationship status: Not on file     Intimate partner violence     Fear of current or ex partner: Not on file     Emotionally abused: Not on file     Physically abused: Not on file     Forced sexual activity: Not on file   Other Topics Concern     Parent/sibling w/ CABG, MI or angioplasty before 65F 55M? Not Asked   Social History Narrative     Not on file       Family History  Family History   Problem Relation Age of Onset     Chronic Obstructive Pulmonary Disease Mother      Hypertension Father       -   ROS/MED HX    ENT/Pulmonary:     (+)DORIS risk factors tobacco use (Smoked last 25 years, 1-2 PPD. ), Current use , . .    Neurologic: Comment: Lumbar radiculopathy with worsening right buttocks and right leg.     Reports evaluated for a seizure type event last October 2019.  Was referred to neurology and reports that he does not have a seizure disorder.  He discontinued his Depakote about two weeks ago.       Cardiovascular:  - neg cardiovascular ROS   (+) ----. : . . . :. . No previous cardiac testing       METS/Exercise Tolerance: Comment: METS <4 but were >4 after his back surgery in March 2020.     Hematologic:  - neg hematologic  ROS       Musculoskeletal: Comment: Left and right meniscus surgery in the past.  Left meniscus is torn again.         GI/Hepatic: Comment: Right sided inguinal hernia  "s/p repair.     (+) GERD Asymptomatic on medication, appendicitis (s/p appendectomy. ), cholecystitis/cholelithiasis (s/p cholecystectomy ),       Renal/Genitourinary:     (+) Nephrolithiasis  (No recent issues. ),       Endo: Comment: Last steroid dose pack 6/17/20.        Psychiatric:     (+) psychiatric history anxiety and other (comment) (PTSD - veteren )      Infectious Disease:  - neg infectious disease ROS       Malignancy:      - no malignancy   Other:    (+) No chance of pregnancy C-spine cleared: Yes, H/O Chronic Pain,H/O chronic opiod use , other significant disability Other (comment)                       Temp: 98  F (36.7  C) Temp src: Oral BP: (!) 132/97 Pulse: 88   Resp: 12 SpO2: 96 %         165 lbs 0 oz  5' 8\"[Pt report[   Body mass index is 25.09 kg/m .       Physical Exam  Constitutional: Awake, alert, cooperative, appears to be in pain and appears stated age.  Eyes: Pupils equal, round and reactive to light, extra ocular muscles intact, sclera clear, conjunctiva normal.  HENT: Normocephalic, oral pharynx with moist mucus membranes, good dentition. No goiter appreciated.   Respiratory: Clear to auscultation bilaterally, no crackles or wheezing.  Cardiovascular: Regular rate and rhythm, normal S1 and S2, and no murmur noted.  Carotids +2, no bruits. No edema. Palpable pulses to radial  DP and PT arteries.   GI: Normal bowel sounds, soft, non-distended, non-tender, no masses palpated, no hepatosplenomegaly.    Lymph/Hematologic: No cervical lymphadenopathy and no supraclavicular lymphadenopathy.  Genitourinary:  deferred  Skin: Warm and dry.    Musculoskeletal: Full ROM of neck.    Neurologic: Awake, alert, oriented to name, place and time.  Altered gait.   Neuropsychiatric: Calm, cooperative. Normal affect.     Labs: (personally reviewed)  Lab Results   Component Value Date    WBC 7.2 06/24/2020     Lab Results   Component Value Date    RBC 5.11 06/24/2020     Lab Results   Component Value Date    " HGB 15.9 06/24/2020     Lab Results   Component Value Date    HCT 47.9 06/24/2020     Lab Results   Component Value Date    MCV 94 06/24/2020     Lab Results   Component Value Date    MCH 31.1 06/24/2020     Lab Results   Component Value Date    MCHC 33.2 06/24/2020     Lab Results   Component Value Date    RDW 11.8 06/24/2020     Lab Results   Component Value Date     06/24/2020       Last Comprehensive Metabolic Panel:  Sodium   Date Value Ref Range Status   06/24/2020 138 133 - 144 mmol/L Final     Potassium   Date Value Ref Range Status   06/24/2020 4.3 3.4 - 5.3 mmol/L Final     Chloride   Date Value Ref Range Status   06/24/2020 109 94 - 109 mmol/L Final     Carbon Dioxide   Date Value Ref Range Status   06/24/2020 25 20 - 32 mmol/L Final     Anion Gap   Date Value Ref Range Status   06/24/2020 4 3 - 14 mmol/L Final     Glucose   Date Value Ref Range Status   06/24/2020 81 70 - 99 mg/dL Final     Urea Nitrogen   Date Value Ref Range Status   06/24/2020 17 7 - 30 mg/dL Final     Creatinine   Date Value Ref Range Status   06/24/2020 1.08 0.66 - 1.25 mg/dL Final     GFR Estimate   Date Value Ref Range Status   06/24/2020 84 >60 mL/min/[1.73_m2] Final     Comment:     Non  GFR Calc  Starting 12/18/2018, serum creatinine based estimated GFR (eGFR) will be   calculated using the Chronic Kidney Disease Epidemiology Collaboration   (CKD-EPI) equation.       Calcium   Date Value Ref Range Status   06/24/2020 8.7 8.5 - 10.1 mg/dL Final       Procedures  PROCEDURES  MR LUMBAR SPINE W/O CONTRAST 6/9/2020 7:51 AM                                                                  Impression: Postoperative changes of right hemilaminectomy and  microdiscectomy. Stable small residual right subarticular to foraminal  disc protrusion which abuts the exiting right S1 nerve root with  possible impingement. No significant change from MR 5/12/2020.     I have personally reviewed the examination and initial  interpretation  and I agree with the findings.     HALEY JEFF MD     EXAM: XR LUMBAR SPINE PORT 1 VW  6/4/2020 11:44 AM       HISTORY: Right Revision L5-S1 Microdiscectomy     COMPARISON: 2/25/2020     FINDINGS: Lateral intraoperative radiograph of the lumbar spine. 5  lumbar type vertebral bodies presumed comment keeping with previous  numbering convention.     Single surgical instrument noted at the level of L5-S1 disc space.  Disc space loss L5-S1 similar to prior.                                                                       IMPRESSION: Intraoperative radiograph of the lumbar spine with  surgical marker at the level of L5-S1.        FARA DELAROSA MD (Joe)      ASSESSMENT and PLAN  Ortega Burroughs is a 40 year old male scheduled to undergo  Lumbar 5 to Sacral 1 instrumented posterior interbody fusion and mchugh peters osteotomy with O-Arm/Stealth Navigation, use of allograft, local autograft, and possible iliac crest autograft on 6/26/20 with Dr. Waldrop at [facility] under general anesthesia.     He has the following specific operative considerations:   - DORIS # of risks 1/8 = low risk  - VTE risk:  0.5%  - Known PONV:  anti-emetic intervention recommended.      #  Cardiology   - Denies known coronary artery disease.  Denies cardiac symptoms.  - METS:  <4. RCRI : No serious cardiac risks.  0.4 % risk of major adverse cardiac event.           #  Pulmonary   - tobacco dependence, 25+ pack-years: Currently down to <0.5PPD. Encouraged smoking cessation.  Encourage coughing/deep breathing.  LS clear on exam. Consider use of bronchodilators to optimize pulmonary function in the perioperative period.     #  GI -     - GERD, take PPI DOS       #  Musculoskeletal   - Lumbar radiculopathy s/p  right Lumbar 5 to Sacral 1 microdiscectomy on 3/9/2020 now with severe lumbar radiculopathy symptoms with limited mobility.  Above procedure planned. Recommend fall precautions.  Chronic Pain with  morphine equivalent = 60 mg/hour.  Evaluated by PAC Pharm D.  Please see his note.Take Norco and pregabalin as prescribed.  Also uses only supply of cannabis.     #  Neuro   - Anxiety/panic disorder and PTSD, take diazepam as prescribed.   - Insomnia, take Seroquel as prescribed.       - Anesthesia considerations:  6/4/20 ETT Mask Ventilation: Easy; Ease of Intubation: Easy; Airway Size: 8;  Cuffed;  Oral;  Blade Type: Portillo;  Blade Size: 2;  Place by: KV;  Insertion Attempts: 1;  Secured at (cm)to lip: 23 cm;  Breath Sounds: Equal, clear and bilateral;  End Tidal CO2: Present;  Dentition: Intact, Unchanged;  Grade View of Cords: 2. On 3/9/20, C-Mac was used for teaching purposes per chart review.  Refer to PAC assessment in anesthesia records    - COVID testing arranged per surgeon     Arrival time, NPO, shower and medication instructions provided by nursing staff today.  Preparing For Your Surgery handout given.  Patient was discussed with Dr Umanzor.      EFRAÍN Donovan CNP  Preoperative Assessment Center  [facility]  Clinic and Surgery Center  Phone: [phone]  Fax: [phone]

## 2020-06-24 NOTE — PATIENT INSTRUCTIONS
Preparing for Your Surgery      Name:  Ortega Burroughs   MRN:  [id number]   :  1980   Today's Date:  2020     Arriving for surgery:  Surgery date:  20  Arrival time:  07:30 am  Surgical patients can have one visitor during the preoperative phase only. No visitors under the age of 18. Due to the COVID 19 crisis, we are trying to keep our patients safe from others who might have respiratory illnesses, if you have a respiratory illness or symptoms of COVID 19 please do not come into the hospital as a visitor.  Also, at this time [employer] parking is not available.  Please come to:     [facility], [facility] [address]  [address] [place], [place] [address]. [place]  [address]    -Drop patient off in front of [facility].    -Proceed to the 3rd floor, check in at the Adult Surgery Waiting Lounge. [phone]    If an escort is needed stop at the Information Desk in the lobby. Inform the information person that you are here for surgery. An escort to the Adult Surgery Waiting Lounge will be provided.        What can I eat or drink?  -  You may have solid food or milk products until 8 hours prior to your surgery.  -  You may have water, apple juice or 7up/Sprite until 2 hours prior to your surgery.    Which medicines can I take?  Hold Aspirin, vitamins and supplements one week prior to surgery.  Hold Ibuprofen + cannibus for 24 hours and/or Naproxen for 48 hours prior to surgery.   -  Please take these medications the day of surgery:  Tylenol + norco + valium if needed + depakote and prilosec if normally taken in the morning.    How do I prepare myself?  -  Take two showers: one the night before surgery; and one the morning of surgery.         Use Scrubcare or Hibiclens to wash from neck down, leave soap on your skin for up to one minute.  Do not get soap in your eyes or ears.  You may use your own shampoo and conditioner; no other hair products.   -  Do NOT use lotion, powder, deodorant, or  antiperspirant the day of your surgery.  -  Do NOT wear any jewelry.  - Do not bring your own medications to the hospital, except for inhalers and eye drops.  -  Bring your ID and insurance card.        Questions or Concerns:  -If you are scheduled on the East or West campus and have questions or concerns regarding the day of surgery, please call Preadmission Nursing at [phone].   -If you have health changes between today and your surgery please call your surgeon. For questions after surgery please call your surgeons office.

## 2020-06-26 ENCOUNTER — HOSPITAL ENCOUNTER (INPATIENT)
Facility: CLINIC | Age: 40
LOS: 4 days | Discharge: HOME HEALTH | DRG: 455 | End: 2020-06-30
Attending: ORTHOPAEDIC SURGERY | Admitting: ORTHOPAEDIC SURGERY
Payer: COMMERCIAL

## 2020-06-26 ENCOUNTER — ANESTHESIA (OUTPATIENT)
Dept: SURGERY | Facility: CLINIC | Age: 40
DRG: 455 | End: 2020-06-26
Payer: COMMERCIAL

## 2020-06-26 ENCOUNTER — APPOINTMENT (OUTPATIENT)
Dept: GENERAL RADIOLOGY | Facility: CLINIC | Age: 40
DRG: 455 | End: 2020-06-26
Attending: ORTHOPAEDIC SURGERY
Payer: COMMERCIAL

## 2020-06-26 DIAGNOSIS — M51.16 LUMBAR DISC HERNIATION WITH RADICULOPATHY: ICD-10-CM

## 2020-06-26 DIAGNOSIS — M54.16 LUMBAR RADICULOPATHY: ICD-10-CM

## 2020-06-26 DIAGNOSIS — Z98.890 STATUS POST LUMBAR SPINE SURGERY FOR DECOMPRESSION OF SPINAL CORD: Primary | ICD-10-CM

## 2020-06-26 DIAGNOSIS — G89.4 CHRONIC PAIN DISORDER: ICD-10-CM

## 2020-06-26 LAB — GLUCOSE BLDC GLUCOMTR-MCNC: 84 MG/DL (ref 70–99)

## 2020-06-26 PROCEDURE — 27810169 ZZH OR IMPLANT GENERAL: Performed by: ORTHOPAEDIC SURGERY

## 2020-06-26 PROCEDURE — 37000008 ZZH ANESTHESIA TECHNICAL FEE, 1ST 30 MIN: Performed by: ORTHOPAEDIC SURGERY

## 2020-06-26 PROCEDURE — 25000132 ZZH RX MED GY IP 250 OP 250 PS 637: Performed by: PHYSICIAN ASSISTANT

## 2020-06-26 PROCEDURE — 27210995 ZZH RX 272: Performed by: ORTHOPAEDIC SURGERY

## 2020-06-26 PROCEDURE — 25000128 H RX IP 250 OP 636: Performed by: PHYSICIAN ASSISTANT

## 2020-06-26 PROCEDURE — 25800030 ZZH RX IP 258 OP 636: Performed by: NURSE ANESTHETIST, CERTIFIED REGISTERED

## 2020-06-26 PROCEDURE — 25000565 ZZH ISOFLURANE, EA 15 MIN: Performed by: ORTHOPAEDIC SURGERY

## 2020-06-26 PROCEDURE — C1762 CONN TISS, HUMAN(INC FASCIA): HCPCS | Performed by: ORTHOPAEDIC SURGERY

## 2020-06-26 PROCEDURE — 25000125 ZZHC RX 250: Performed by: NURSE ANESTHETIST, CERTIFIED REGISTERED

## 2020-06-26 PROCEDURE — 25000132 ZZH RX MED GY IP 250 OP 250 PS 637: Performed by: HOSPITALIST

## 2020-06-26 PROCEDURE — 00000146 ZZHCL STATISTIC GLUCOSE BY METER IP

## 2020-06-26 PROCEDURE — 12000001 ZZH R&B MED SURG/OB UMMC

## 2020-06-26 PROCEDURE — 8E0WXBF COMPUTER ASSISTED PROCEDURE OF TRUNK REGION, WITH FLUOROSCOPY: ICD-10-PCS | Performed by: ORTHOPAEDIC SURGERY

## 2020-06-26 PROCEDURE — 40000278 XR SURGERY CARM FLUORO LESS THAN 5 MIN: Mod: TC

## 2020-06-26 PROCEDURE — 36000076 ZZH SURGERY LEVEL 6 EA 15 ADDTL MIN - UMMC: Performed by: ORTHOPAEDIC SURGERY

## 2020-06-26 PROCEDURE — 40000170 ZZH STATISTIC PRE-PROCEDURE ASSESSMENT II: Performed by: ORTHOPAEDIC SURGERY

## 2020-06-26 PROCEDURE — 0QB20ZZ EXCISION OF RIGHT PELVIC BONE, OPEN APPROACH: ICD-10-PCS | Performed by: ORTHOPAEDIC SURGERY

## 2020-06-26 PROCEDURE — 0SB40ZZ EXCISION OF LUMBOSACRAL DISC, OPEN APPROACH: ICD-10-PCS | Performed by: ORTHOPAEDIC SURGERY

## 2020-06-26 PROCEDURE — 25000128 H RX IP 250 OP 636: Performed by: ORTHOPAEDIC SURGERY

## 2020-06-26 PROCEDURE — 0SG3071 FUSION OF LUMBOSACRAL JOINT WITH AUTOLOGOUS TISSUE SUBSTITUTE, POSTERIOR APPROACH, POSTERIOR COLUMN, OPEN APPROACH: ICD-10-PCS | Performed by: ORTHOPAEDIC SURGERY

## 2020-06-26 PROCEDURE — 0QS00ZZ REPOSITION LUMBAR VERTEBRA, OPEN APPROACH: ICD-10-PCS | Performed by: ORTHOPAEDIC SURGERY

## 2020-06-26 PROCEDURE — 71000014 ZZH RECOVERY PHASE 1 LEVEL 2 FIRST HR: Performed by: ORTHOPAEDIC SURGERY

## 2020-06-26 PROCEDURE — 37000009 ZZH ANESTHESIA TECHNICAL FEE, EACH ADDTL 15 MIN: Performed by: ORTHOPAEDIC SURGERY

## 2020-06-26 PROCEDURE — 25000125 ZZHC RX 250: Performed by: ORTHOPAEDIC SURGERY

## 2020-06-26 PROCEDURE — 71000015 ZZH RECOVERY PHASE 1 LEVEL 2 EA ADDTL HR: Performed by: ORTHOPAEDIC SURGERY

## 2020-06-26 PROCEDURE — 25000125 ZZHC RX 250: Performed by: PHYSICIAN ASSISTANT

## 2020-06-26 PROCEDURE — 25000128 H RX IP 250 OP 636: Performed by: NURSE ANESTHETIST, CERTIFIED REGISTERED

## 2020-06-26 PROCEDURE — 25800030 ZZH RX IP 258 OP 636: Performed by: ANESTHESIOLOGY

## 2020-06-26 PROCEDURE — 99232 SBSQ HOSP IP/OBS MODERATE 35: CPT | Performed by: HOSPITALIST

## 2020-06-26 PROCEDURE — 25000301 ZZH OR RX SURGIFLO W/THROMBIN KIT 2ML 1991 OPNP: Performed by: ORTHOPAEDIC SURGERY

## 2020-06-26 PROCEDURE — C1713 ANCHOR/SCREW BN/BN,TIS/BN: HCPCS | Performed by: ORTHOPAEDIC SURGERY

## 2020-06-26 PROCEDURE — 25000132 ZZH RX MED GY IP 250 OP 250 PS 637: Performed by: STUDENT IN AN ORGANIZED HEALTH CARE EDUCATION/TRAINING PROGRAM

## 2020-06-26 PROCEDURE — 25000128 H RX IP 250 OP 636: Performed by: ANESTHESIOLOGY

## 2020-06-26 PROCEDURE — 36000078 ZZH SURGERY LEVEL 6 W FLUORO 1ST 30 MIN - UMMC: Performed by: ORTHOPAEDIC SURGERY

## 2020-06-26 PROCEDURE — 25000125 ZZHC RX 250: Performed by: HOSPITALIST

## 2020-06-26 PROCEDURE — 25800030 ZZH RX IP 258 OP 636

## 2020-06-26 PROCEDURE — 27210794 ZZH OR GENERAL SUPPLY STERILE: Performed by: ORTHOPAEDIC SURGERY

## 2020-06-26 PROCEDURE — 0SG30AJ FUSION OF LUMBOSACRAL JOINT WITH INTERBODY FUSION DEVICE, POSTERIOR APPROACH, ANTERIOR COLUMN, OPEN APPROACH: ICD-10-PCS | Performed by: ORTHOPAEDIC SURGERY

## 2020-06-26 PROCEDURE — 25000128 H RX IP 250 OP 636: Performed by: STUDENT IN AN ORGANIZED HEALTH CARE EDUCATION/TRAINING PROGRAM

## 2020-06-26 PROCEDURE — 25800030 ZZH RX IP 258 OP 636: Performed by: PHYSICIAN ASSISTANT

## 2020-06-26 PROCEDURE — 99207 ZZC CONSULT E&M CHANGED TO SUBSEQUENT LEVEL: CPT | Performed by: HOSPITALIST

## 2020-06-26 DEVICE — IMP SCR SET MEDT SOLERA BREAK OFF 5.5MM TI 5540030: Type: IMPLANTABLE DEVICE | Site: SPINE LUMBAR | Status: FUNCTIONAL

## 2020-06-26 DEVICE — GRAFT BONE CRUSH CANC 30ML 400080: Type: IMPLANTABLE DEVICE | Site: HIP | Status: FUNCTIONAL

## 2020-06-26 DEVICE — IMP ROD MEDT SOLERA CVD 5.5X35MM CHR 1555501035: Type: IMPLANTABLE DEVICE | Site: SPINE LUMBAR | Status: FUNCTIONAL

## 2020-06-26 DEVICE — IMP ROD MEDT SOLERA CVD 5.5X40MM CHR 1555501040: Type: IMPLANTABLE DEVICE | Site: SPINE LUMBAR | Status: FUNCTIONAL

## 2020-06-26 DEVICE — SPACER 4021322 18 DEG 13X22
Type: IMPLANTABLE DEVICE | Site: SPINE LUMBAR | Status: FUNCTIONAL
Brand: CAPSTONE CONTROL™ SPINAL SYSTEM

## 2020-06-26 DEVICE — IMP SCR MEDT 5.5/6.0MM SOLERA 7.5X50MM MA 55840007550: Type: IMPLANTABLE DEVICE | Site: SPINE LUMBAR | Status: FUNCTIONAL

## 2020-06-26 DEVICE — IMP SCR MEDT 5.5/6.0MM SOLERA 7.5X45MM MA 55840007545: Type: IMPLANTABLE DEVICE | Site: SPINE LUMBAR | Status: FUNCTIONAL

## 2020-06-26 DEVICE — IMP SCR MEDT 5.5/6.0MM SOLERA 7.5X55MM MA 55840007555: Type: IMPLANTABLE DEVICE | Site: SPINE LUMBAR | Status: FUNCTIONAL

## 2020-06-26 RX ORDER — NALOXONE HYDROCHLORIDE 0.4 MG/ML
.1-.4 INJECTION, SOLUTION INTRAMUSCULAR; INTRAVENOUS; SUBCUTANEOUS
Status: DISCONTINUED | OUTPATIENT
Start: 2020-06-26 | End: 2020-06-26 | Stop reason: HOSPADM

## 2020-06-26 RX ORDER — ONDANSETRON 2 MG/ML
4 INJECTION INTRAMUSCULAR; INTRAVENOUS EVERY 6 HOURS PRN
Status: DISCONTINUED | OUTPATIENT
Start: 2020-06-26 | End: 2020-06-30 | Stop reason: HOSPADM

## 2020-06-26 RX ORDER — NALOXONE HYDROCHLORIDE 0.4 MG/ML
.1-.4 INJECTION, SOLUTION INTRAMUSCULAR; INTRAVENOUS; SUBCUTANEOUS
Status: DISCONTINUED | OUTPATIENT
Start: 2020-06-26 | End: 2020-06-27

## 2020-06-26 RX ORDER — PREGABALIN 100 MG/1
200 CAPSULE ORAL 3 TIMES DAILY
Status: DISCONTINUED | OUTPATIENT
Start: 2020-06-26 | End: 2020-06-30 | Stop reason: HOSPADM

## 2020-06-26 RX ORDER — SCOLOPAMINE TRANSDERMAL SYSTEM 1 MG/1
1 PATCH, EXTENDED RELEASE TRANSDERMAL
Status: DISCONTINUED | OUTPATIENT
Start: 2020-06-26 | End: 2020-06-30 | Stop reason: HOSPADM

## 2020-06-26 RX ORDER — ONDANSETRON 4 MG/1
4 TABLET, ORALLY DISINTEGRATING ORAL EVERY 6 HOURS PRN
Status: DISCONTINUED | OUTPATIENT
Start: 2020-06-26 | End: 2020-06-30 | Stop reason: HOSPADM

## 2020-06-26 RX ORDER — MEPERIDINE HYDROCHLORIDE 25 MG/ML
12.5 INJECTION INTRAMUSCULAR; INTRAVENOUS; SUBCUTANEOUS
Status: DISCONTINUED | OUTPATIENT
Start: 2020-06-26 | End: 2020-06-26 | Stop reason: HOSPADM

## 2020-06-26 RX ORDER — KETOROLAC TROMETHAMINE 15 MG/ML
15 INJECTION, SOLUTION INTRAMUSCULAR; INTRAVENOUS EVERY 6 HOURS
Status: DISPENSED | OUTPATIENT
Start: 2020-06-26 | End: 2020-06-27

## 2020-06-26 RX ORDER — TRIAMCINOLONE ACETONIDE 40 MG/ML
INJECTION, SUSPENSION INTRA-ARTICULAR; INTRAMUSCULAR PRN
Status: DISCONTINUED | OUTPATIENT
Start: 2020-06-26 | End: 2020-06-26 | Stop reason: HOSPADM

## 2020-06-26 RX ORDER — LIDOCAINE 40 MG/G
CREAM TOPICAL
Status: DISCONTINUED | OUTPATIENT
Start: 2020-06-26 | End: 2020-06-30 | Stop reason: HOSPADM

## 2020-06-26 RX ORDER — LIDOCAINE 4 G/G
2 PATCH TOPICAL DAILY
Status: DISCONTINUED | OUTPATIENT
Start: 2020-06-26 | End: 2020-06-30 | Stop reason: HOSPADM

## 2020-06-26 RX ORDER — SODIUM CHLORIDE, SODIUM LACTATE, POTASSIUM CHLORIDE, CALCIUM CHLORIDE 600; 310; 30; 20 MG/100ML; MG/100ML; MG/100ML; MG/100ML
INJECTION, SOLUTION INTRAVENOUS CONTINUOUS
Status: DISCONTINUED | OUTPATIENT
Start: 2020-06-26 | End: 2020-06-28

## 2020-06-26 RX ORDER — DEXAMETHASONE SODIUM PHOSPHATE 4 MG/ML
INJECTION, SOLUTION INTRA-ARTICULAR; INTRALESIONAL; INTRAMUSCULAR; INTRAVENOUS; SOFT TISSUE PRN
Status: DISCONTINUED | OUTPATIENT
Start: 2020-06-26 | End: 2020-06-26

## 2020-06-26 RX ORDER — ONDANSETRON 2 MG/ML
4 INJECTION INTRAMUSCULAR; INTRAVENOUS EVERY 30 MIN PRN
Status: DISCONTINUED | OUTPATIENT
Start: 2020-06-26 | End: 2020-06-26 | Stop reason: HOSPADM

## 2020-06-26 RX ORDER — HYDROMORPHONE HYDROCHLORIDE 1 MG/ML
.3-.5 INJECTION, SOLUTION INTRAMUSCULAR; INTRAVENOUS; SUBCUTANEOUS EVERY 10 MIN PRN
Status: DISCONTINUED | OUTPATIENT
Start: 2020-06-26 | End: 2020-06-26 | Stop reason: HOSPADM

## 2020-06-26 RX ORDER — SODIUM CHLORIDE, SODIUM LACTATE, POTASSIUM CHLORIDE, CALCIUM CHLORIDE 600; 310; 30; 20 MG/100ML; MG/100ML; MG/100ML; MG/100ML
INJECTION, SOLUTION INTRAVENOUS CONTINUOUS
Status: DISCONTINUED | OUTPATIENT
Start: 2020-06-26 | End: 2020-06-26 | Stop reason: HOSPADM

## 2020-06-26 RX ORDER — HYDROMORPHONE HCL/0.9% NACL/PF 0.2MG/0.2
.2-.4 SYRINGE (ML) INTRAVENOUS
Status: DISCONTINUED | OUTPATIENT
Start: 2020-06-26 | End: 2020-06-27

## 2020-06-26 RX ORDER — CEFAZOLIN SODIUM 1 G/3ML
INJECTION, POWDER, FOR SOLUTION INTRAMUSCULAR; INTRAVENOUS PRN
Status: DISCONTINUED | OUTPATIENT
Start: 2020-06-26 | End: 2020-06-26

## 2020-06-26 RX ORDER — ACETAMINOPHEN 325 MG/1
650 TABLET ORAL EVERY 4 HOURS PRN
Status: DISCONTINUED | OUTPATIENT
Start: 2020-06-29 | End: 2020-06-30 | Stop reason: HOSPADM

## 2020-06-26 RX ORDER — AMOXICILLIN 250 MG
1 CAPSULE ORAL 2 TIMES DAILY
Status: DISCONTINUED | OUTPATIENT
Start: 2020-06-26 | End: 2020-06-30 | Stop reason: HOSPADM

## 2020-06-26 RX ORDER — LIDOCAINE HYDROCHLORIDE ANHYDROUS AND DEXTROSE MONOHYDRATE .8; 5 G/100ML; G/100ML
1 INJECTION, SOLUTION INTRAVENOUS CONTINUOUS
Status: DISCONTINUED | OUTPATIENT
Start: 2020-06-26 | End: 2020-06-26

## 2020-06-26 RX ORDER — VANCOMYCIN HYDROCHLORIDE 1 G/20ML
INJECTION, POWDER, LYOPHILIZED, FOR SOLUTION INTRAVENOUS PRN
Status: DISCONTINUED | OUTPATIENT
Start: 2020-06-26 | End: 2020-06-26 | Stop reason: HOSPADM

## 2020-06-26 RX ORDER — OXYCODONE HYDROCHLORIDE 5 MG/1
5-10 TABLET ORAL
Status: DISCONTINUED | OUTPATIENT
Start: 2020-06-26 | End: 2020-06-27

## 2020-06-26 RX ORDER — ONDANSETRON 2 MG/ML
INJECTION INTRAMUSCULAR; INTRAVENOUS PRN
Status: DISCONTINUED | OUTPATIENT
Start: 2020-06-26 | End: 2020-06-26

## 2020-06-26 RX ORDER — ACETAMINOPHEN 325 MG/1
975 TABLET ORAL EVERY 8 HOURS
Status: DISPENSED | OUTPATIENT
Start: 2020-06-26 | End: 2020-06-29

## 2020-06-26 RX ORDER — NALOXONE HYDROCHLORIDE 0.4 MG/ML
.1-.4 INJECTION, SOLUTION INTRAMUSCULAR; INTRAVENOUS; SUBCUTANEOUS
Status: DISCONTINUED | OUTPATIENT
Start: 2020-06-26 | End: 2020-06-30 | Stop reason: HOSPADM

## 2020-06-26 RX ORDER — ACETAMINOPHEN 325 MG/1
650 TABLET ORAL EVERY 4 HOURS PRN
Status: DISCONTINUED | OUTPATIENT
Start: 2020-06-29 | End: 2020-06-27

## 2020-06-26 RX ORDER — CEFAZOLIN SODIUM 1 G/3ML
1 INJECTION, POWDER, FOR SOLUTION INTRAMUSCULAR; INTRAVENOUS EVERY 8 HOURS
Status: COMPLETED | OUTPATIENT
Start: 2020-06-26 | End: 2020-06-27

## 2020-06-26 RX ORDER — LIDOCAINE HYDROCHLORIDE ANHYDROUS AND DEXTROSE MONOHYDRATE .8; 5 G/100ML; G/100ML
1 INJECTION, SOLUTION INTRAVENOUS CONTINUOUS
Status: DISCONTINUED | OUTPATIENT
Start: 2020-06-26 | End: 2020-06-26 | Stop reason: CLARIF

## 2020-06-26 RX ORDER — SODIUM CHLORIDE, SODIUM LACTATE, POTASSIUM CHLORIDE, CALCIUM CHLORIDE 600; 310; 30; 20 MG/100ML; MG/100ML; MG/100ML; MG/100ML
INJECTION, SOLUTION INTRAVENOUS CONTINUOUS PRN
Status: DISCONTINUED | OUTPATIENT
Start: 2020-06-26 | End: 2020-06-26

## 2020-06-26 RX ORDER — ONDANSETRON 2 MG/ML
4 INJECTION INTRAMUSCULAR; INTRAVENOUS EVERY 6 HOURS PRN
Status: DISCONTINUED | OUTPATIENT
Start: 2020-06-26 | End: 2020-06-27

## 2020-06-26 RX ORDER — POLYETHYLENE GLYCOL 3350 17 G/17G
17 POWDER, FOR SOLUTION ORAL DAILY PRN
Status: DISCONTINUED | OUTPATIENT
Start: 2020-06-26 | End: 2020-06-30 | Stop reason: HOSPADM

## 2020-06-26 RX ORDER — CEFAZOLIN SODIUM 1 G/3ML
1 INJECTION, POWDER, FOR SOLUTION INTRAMUSCULAR; INTRAVENOUS SEE ADMIN INSTRUCTIONS
Status: DISCONTINUED | OUTPATIENT
Start: 2020-06-26 | End: 2020-06-26 | Stop reason: HOSPADM

## 2020-06-26 RX ORDER — PROCHLORPERAZINE MALEATE 10 MG
10 TABLET ORAL EVERY 6 HOURS PRN
Status: DISCONTINUED | OUTPATIENT
Start: 2020-06-26 | End: 2020-06-30 | Stop reason: HOSPADM

## 2020-06-26 RX ORDER — FENTANYL CITRATE 50 UG/ML
INJECTION, SOLUTION INTRAMUSCULAR; INTRAVENOUS PRN
Status: DISCONTINUED | OUTPATIENT
Start: 2020-06-26 | End: 2020-06-26

## 2020-06-26 RX ORDER — LIDOCAINE HYDROCHLORIDE 20 MG/ML
INJECTION, SOLUTION INFILTRATION; PERINEURAL PRN
Status: DISCONTINUED | OUTPATIENT
Start: 2020-06-26 | End: 2020-06-26

## 2020-06-26 RX ORDER — ONDANSETRON 4 MG/1
4 TABLET, ORALLY DISINTEGRATING ORAL EVERY 6 HOURS PRN
Status: DISCONTINUED | OUTPATIENT
Start: 2020-06-26 | End: 2020-06-27

## 2020-06-26 RX ORDER — MAGNESIUM HYDROXIDE 1200 MG/15ML
LIQUID ORAL PRN
Status: DISCONTINUED | OUTPATIENT
Start: 2020-06-26 | End: 2020-06-26 | Stop reason: HOSPADM

## 2020-06-26 RX ORDER — PROPOFOL 10 MG/ML
INJECTION, EMULSION INTRAVENOUS CONTINUOUS PRN
Status: DISCONTINUED | OUTPATIENT
Start: 2020-06-26 | End: 2020-06-26

## 2020-06-26 RX ORDER — SODIUM CHLORIDE, SODIUM LACTATE, POTASSIUM CHLORIDE, CALCIUM CHLORIDE 600; 310; 30; 20 MG/100ML; MG/100ML; MG/100ML; MG/100ML
INJECTION, SOLUTION INTRAVENOUS
Status: COMPLETED
Start: 2020-06-26 | End: 2020-06-26

## 2020-06-26 RX ORDER — LIDOCAINE 40 MG/G
CREAM TOPICAL
Status: DISCONTINUED | OUTPATIENT
Start: 2020-06-26 | End: 2020-06-26 | Stop reason: HOSPADM

## 2020-06-26 RX ORDER — AMOXICILLIN 250 MG
1-2 CAPSULE ORAL 2 TIMES DAILY
Status: DISCONTINUED | OUTPATIENT
Start: 2020-06-26 | End: 2020-06-30

## 2020-06-26 RX ORDER — LIDOCAINE HYDROCHLORIDE ANHYDROUS AND DEXTROSE MONOHYDRATE .8; 5 G/100ML; G/100ML
1 INJECTION, SOLUTION INTRAVENOUS CONTINUOUS
Status: DISCONTINUED | OUTPATIENT
Start: 2020-06-26 | End: 2020-06-30

## 2020-06-26 RX ORDER — HYDROMORPHONE HYDROCHLORIDE 1 MG/ML
.5-1 INJECTION, SOLUTION INTRAMUSCULAR; INTRAVENOUS; SUBCUTANEOUS
Status: DISCONTINUED | OUTPATIENT
Start: 2020-06-26 | End: 2020-06-29

## 2020-06-26 RX ORDER — DIAZEPAM 10 MG/2ML
5 INJECTION, SOLUTION INTRAMUSCULAR; INTRAVENOUS ONCE
Status: COMPLETED | OUTPATIENT
Start: 2020-06-26 | End: 2020-06-26

## 2020-06-26 RX ORDER — LANOLIN ALCOHOL/MO/W.PET/CERES
3 CREAM (GRAM) TOPICAL
Status: DISCONTINUED | OUTPATIENT
Start: 2020-06-27 | End: 2020-06-30 | Stop reason: HOSPADM

## 2020-06-26 RX ORDER — AMOXICILLIN 250 MG
2 CAPSULE ORAL 2 TIMES DAILY
Status: DISCONTINUED | OUTPATIENT
Start: 2020-06-26 | End: 2020-06-30 | Stop reason: HOSPADM

## 2020-06-26 RX ORDER — FENTANYL CITRATE 50 UG/ML
25-50 INJECTION, SOLUTION INTRAMUSCULAR; INTRAVENOUS
Status: DISCONTINUED | OUTPATIENT
Start: 2020-06-26 | End: 2020-06-26 | Stop reason: HOSPADM

## 2020-06-26 RX ORDER — FENTANYL CITRATE 50 UG/ML
50 INJECTION, SOLUTION INTRAMUSCULAR; INTRAVENOUS
Status: ACTIVE | OUTPATIENT
Start: 2020-06-26 | End: 2020-06-26

## 2020-06-26 RX ORDER — PROPOFOL 10 MG/ML
INJECTION, EMULSION INTRAVENOUS PRN
Status: DISCONTINUED | OUTPATIENT
Start: 2020-06-26 | End: 2020-06-26

## 2020-06-26 RX ORDER — CEFAZOLIN SODIUM 2 G/100ML
2 INJECTION, SOLUTION INTRAVENOUS
Status: COMPLETED | OUTPATIENT
Start: 2020-06-26 | End: 2020-06-26

## 2020-06-26 RX ORDER — PROCHLORPERAZINE MALEATE 10 MG
10 TABLET ORAL EVERY 6 HOURS PRN
Status: DISCONTINUED | OUTPATIENT
Start: 2020-06-26 | End: 2020-06-27

## 2020-06-26 RX ORDER — ONDANSETRON 4 MG/1
4 TABLET, ORALLY DISINTEGRATING ORAL EVERY 30 MIN PRN
Status: DISCONTINUED | OUTPATIENT
Start: 2020-06-26 | End: 2020-06-26 | Stop reason: HOSPADM

## 2020-06-26 RX ORDER — ACETAMINOPHEN 325 MG/1
975 TABLET ORAL EVERY 8 HOURS
Status: DISCONTINUED | OUTPATIENT
Start: 2020-06-26 | End: 2020-06-27

## 2020-06-26 RX ORDER — ACETAMINOPHEN 325 MG/1
975 TABLET ORAL ONCE
Status: COMPLETED | OUTPATIENT
Start: 2020-06-26 | End: 2020-06-26

## 2020-06-26 RX ORDER — QUETIAPINE FUMARATE 50 MG/1
50 TABLET, FILM COATED ORAL AT BEDTIME
Status: DISCONTINUED | OUTPATIENT
Start: 2020-06-26 | End: 2020-06-30 | Stop reason: HOSPADM

## 2020-06-26 RX ORDER — DIAZEPAM 5 MG
5 TABLET ORAL EVERY 6 HOURS PRN
Status: DISCONTINUED | OUTPATIENT
Start: 2020-06-26 | End: 2020-06-29

## 2020-06-26 RX ADMIN — DIAZEPAM 5 MG: 5 TABLET ORAL at 21:36

## 2020-06-26 RX ADMIN — PHENYLEPHRINE HYDROCHLORIDE 100 MCG: 10 INJECTION INTRAVENOUS at 10:41

## 2020-06-26 RX ADMIN — FENTANYL CITRATE 25 MCG: 50 INJECTION, SOLUTION INTRAMUSCULAR; INTRAVENOUS at 09:59

## 2020-06-26 RX ADMIN — SODIUM CHLORIDE, POTASSIUM CHLORIDE, SODIUM LACTATE AND CALCIUM CHLORIDE: 600; 310; 30; 20 INJECTION, SOLUTION INTRAVENOUS at 09:59

## 2020-06-26 RX ADMIN — HYDROMORPHONE HYDROCHLORIDE 0.5 MG: 1 INJECTION, SOLUTION INTRAMUSCULAR; INTRAVENOUS; SUBCUTANEOUS at 15:26

## 2020-06-26 RX ADMIN — CEFAZOLIN 1 G: 1 INJECTION, POWDER, FOR SOLUTION INTRAMUSCULAR; INTRAVENOUS at 21:36

## 2020-06-26 RX ADMIN — PHENYLEPHRINE HYDROCHLORIDE 100 MCG: 10 INJECTION INTRAVENOUS at 10:16

## 2020-06-26 RX ADMIN — QUETIAPINE FUMARATE 50 MG: 50 TABLET ORAL at 21:36

## 2020-06-26 RX ADMIN — OXYCODONE HYDROCHLORIDE 10 MG: 5 TABLET ORAL at 21:36

## 2020-06-26 RX ADMIN — DIAZEPAM 5 MG: 5 INJECTION, SOLUTION INTRAMUSCULAR; INTRAVENOUS at 09:10

## 2020-06-26 RX ADMIN — SUFENTANIL CITRATE 0.2 MCG/KG/HR: 50 INJECTION EPIDURAL; INTRAVENOUS at 10:30

## 2020-06-26 RX ADMIN — SODIUM CHLORIDE, POTASSIUM CHLORIDE, SODIUM LACTATE AND CALCIUM CHLORIDE 1000 ML: 600; 310; 30; 20 INJECTION, SOLUTION INTRAVENOUS at 18:37

## 2020-06-26 RX ADMIN — HYDROMORPHONE HYDROCHLORIDE 0.5 MG: 1 INJECTION, SOLUTION INTRAMUSCULAR; INTRAVENOUS; SUBCUTANEOUS at 15:14

## 2020-06-26 RX ADMIN — CEFAZOLIN 1 G: 1 INJECTION, POWDER, FOR SOLUTION INTRAMUSCULAR; INTRAVENOUS at 12:30

## 2020-06-26 RX ADMIN — DEXAMETHASONE SODIUM PHOSPHATE 4 MG: 4 INJECTION, SOLUTION INTRAMUSCULAR; INTRAVENOUS at 10:30

## 2020-06-26 RX ADMIN — PHENYLEPHRINE HYDROCHLORIDE 100 MCG: 10 INJECTION INTRAVENOUS at 11:06

## 2020-06-26 RX ADMIN — PROPOFOL 200 MG: 10 INJECTION, EMULSION INTRAVENOUS at 10:07

## 2020-06-26 RX ADMIN — FENTANYL CITRATE 50 MCG: 50 INJECTION, SOLUTION INTRAMUSCULAR; INTRAVENOUS at 10:07

## 2020-06-26 RX ADMIN — Medication 1 MG/KG/HR: at 20:58

## 2020-06-26 RX ADMIN — FENTANYL CITRATE 50 MCG: 50 INJECTION INTRAMUSCULAR; INTRAVENOUS at 14:39

## 2020-06-26 RX ADMIN — ROCURONIUM BROMIDE 20 MG: 10 INJECTION INTRAVENOUS at 10:50

## 2020-06-26 RX ADMIN — PHENYLEPHRINE HYDROCHLORIDE 100 MCG: 10 INJECTION INTRAVENOUS at 11:00

## 2020-06-26 RX ADMIN — FENTANYL CITRATE 50 MCG: 50 INJECTION INTRAMUSCULAR; INTRAVENOUS at 14:22

## 2020-06-26 RX ADMIN — HYDROMORPHONE HYDROCHLORIDE 0.5 MG: 1 INJECTION, SOLUTION INTRAMUSCULAR; INTRAVENOUS; SUBCUTANEOUS at 15:42

## 2020-06-26 RX ADMIN — PHENYLEPHRINE HYDROCHLORIDE 100 MCG: 10 INJECTION INTRAVENOUS at 10:39

## 2020-06-26 RX ADMIN — PHENYLEPHRINE HYDROCHLORIDE 100 MCG: 10 INJECTION INTRAVENOUS at 10:30

## 2020-06-26 RX ADMIN — HYDROMORPHONE HYDROCHLORIDE 0.5 MG: 1 INJECTION, SOLUTION INTRAMUSCULAR; INTRAVENOUS; SUBCUTANEOUS at 19:49

## 2020-06-26 RX ADMIN — LIDOCAINE HYDROCHLORIDE 70 MG: 20 INJECTION, SOLUTION INFILTRATION; PERINEURAL at 10:07

## 2020-06-26 RX ADMIN — PHENYLEPHRINE HYDROCHLORIDE 0.6 MCG/KG/MIN: 10 INJECTION INTRAVENOUS at 12:47

## 2020-06-26 RX ADMIN — DIAZEPAM 5 MG: 5 TABLET ORAL at 15:44

## 2020-06-26 RX ADMIN — HYDROMORPHONE HYDROCHLORIDE 0.5 MG: 1 INJECTION, SOLUTION INTRAMUSCULAR; INTRAVENOUS; SUBCUTANEOUS at 22:26

## 2020-06-26 RX ADMIN — PHENYLEPHRINE HYDROCHLORIDE 100 MCG: 10 INJECTION INTRAVENOUS at 10:35

## 2020-06-26 RX ADMIN — PHENYLEPHRINE HYDROCHLORIDE 50 MCG: 10 INJECTION INTRAVENOUS at 12:51

## 2020-06-26 RX ADMIN — ACETAMINOPHEN 975 MG: 325 TABLET, FILM COATED ORAL at 17:42

## 2020-06-26 RX ADMIN — ROCURONIUM BROMIDE 20 MG: 10 INJECTION INTRAVENOUS at 12:19

## 2020-06-26 RX ADMIN — PROPOFOL 50 MCG/KG/MIN: 10 INJECTION, EMULSION INTRAVENOUS at 10:20

## 2020-06-26 RX ADMIN — FENTANYL CITRATE 50 MCG: 50 INJECTION INTRAMUSCULAR; INTRAVENOUS at 09:39

## 2020-06-26 RX ADMIN — Medication 1 MG/KG/HR: at 15:39

## 2020-06-26 RX ADMIN — PHENYLEPHRINE HYDROCHLORIDE 200 MCG: 10 INJECTION INTRAVENOUS at 10:12

## 2020-06-26 RX ADMIN — ROCURONIUM BROMIDE 20 MG: 10 INJECTION INTRAVENOUS at 11:31

## 2020-06-26 RX ADMIN — SODIUM CHLORIDE, POTASSIUM CHLORIDE, SODIUM LACTATE AND CALCIUM CHLORIDE: 600; 310; 30; 20 INJECTION, SOLUTION INTRAVENOUS at 11:48

## 2020-06-26 RX ADMIN — FENTANYL CITRATE 25 MCG: 50 INJECTION, SOLUTION INTRAMUSCULAR; INTRAVENOUS at 10:02

## 2020-06-26 RX ADMIN — PHENYLEPHRINE HYDROCHLORIDE 0.3 MCG/KG/MIN: 10 INJECTION INTRAVENOUS at 11:00

## 2020-06-26 RX ADMIN — TRANEXAMIC ACID 2230 MG: 100 INJECTION, SOLUTION INTRAVENOUS at 10:35

## 2020-06-26 RX ADMIN — LIDOCAINE 2 PATCH: 560 PATCH PERCUTANEOUS; TOPICAL; TRANSDERMAL at 19:54

## 2020-06-26 RX ADMIN — MIDAZOLAM 1.5 MG: 1 INJECTION INTRAMUSCULAR; INTRAVENOUS at 09:59

## 2020-06-26 RX ADMIN — FENTANYL CITRATE 50 MCG: 50 INJECTION, SOLUTION INTRAMUSCULAR; INTRAVENOUS at 14:04

## 2020-06-26 RX ADMIN — ACETAMINOPHEN 975 MG: 325 TABLET, FILM COATED ORAL at 09:41

## 2020-06-26 RX ADMIN — TRANEXAMIC ACID 10 MG/KG/HR: 100 INJECTION, SOLUTION INTRAVENOUS at 11:25

## 2020-06-26 RX ADMIN — ROCURONIUM BROMIDE 50 MG: 10 INJECTION INTRAVENOUS at 10:07

## 2020-06-26 RX ADMIN — SCOPALAMINE 1 PATCH: 1 PATCH, EXTENDED RELEASE TRANSDERMAL at 18:38

## 2020-06-26 RX ADMIN — HYDROMORPHONE HYDROCHLORIDE 0.5 MG: 1 INJECTION, SOLUTION INTRAMUSCULAR; INTRAVENOUS; SUBCUTANEOUS at 16:27

## 2020-06-26 RX ADMIN — PHENYLEPHRINE HYDROCHLORIDE 100 MCG: 10 INJECTION INTRAVENOUS at 10:14

## 2020-06-26 RX ADMIN — ONDANSETRON 4 MG: 2 INJECTION INTRAMUSCULAR; INTRAVENOUS at 13:22

## 2020-06-26 RX ADMIN — OXYCODONE HYDROCHLORIDE 10 MG: 5 TABLET ORAL at 18:37

## 2020-06-26 RX ADMIN — FENTANYL CITRATE 50 MCG: 50 INJECTION INTRAMUSCULAR; INTRAVENOUS at 15:01

## 2020-06-26 RX ADMIN — FENTANYL CITRATE 50 MCG: 50 INJECTION INTRAMUSCULAR; INTRAVENOUS at 14:49

## 2020-06-26 RX ADMIN — OXYCODONE HYDROCHLORIDE 10 MG: 5 TABLET ORAL at 15:44

## 2020-06-26 RX ADMIN — LIDOCAINE HYDROCHLORIDE 1 MG/KG/HR: 8 INJECTION, SOLUTION INTRAVENOUS at 10:30

## 2020-06-26 RX ADMIN — PREGABALIN 200 MG: 100 CAPSULE ORAL at 17:43

## 2020-06-26 RX ADMIN — DOCUSATE SODIUM 50MG AND SENNOSIDES 8.6MG 2 TABLET: 8.6; 5 TABLET, FILM COATED ORAL at 19:54

## 2020-06-26 RX ADMIN — Medication 2 G: at 10:30

## 2020-06-26 RX ADMIN — SUGAMMADEX 160 MG: 100 INJECTION, SOLUTION INTRAVENOUS at 13:57

## 2020-06-26 RX ADMIN — PREGABALIN 200 MG: 100 CAPSULE ORAL at 21:35

## 2020-06-26 RX ADMIN — OMEPRAZOLE 20 MG: 20 CAPSULE, DELAYED RELEASE ORAL at 18:37

## 2020-06-26 RX ADMIN — ROCURONIUM BROMIDE 20 MG: 10 INJECTION INTRAVENOUS at 13:00

## 2020-06-26 RX ADMIN — FENTANYL CITRATE 50 MCG: 50 INJECTION, SOLUTION INTRAMUSCULAR; INTRAVENOUS at 14:15

## 2020-06-26 RX ADMIN — PHENYLEPHRINE HYDROCHLORIDE 100 MCG: 10 INJECTION INTRAVENOUS at 10:55

## 2020-06-26 ASSESSMENT — PAIN DESCRIPTION - DESCRIPTORS
DESCRIPTORS: ACHING
DESCRIPTORS: ACHING

## 2020-06-26 ASSESSMENT — ACTIVITIES OF DAILY LIVING (ADL)
RETIRED_COMMUNICATION: 0-->UNDERSTANDS/COMMUNICATES WITHOUT DIFFICULTY
SWALLOWING: 0-->SWALLOWS FOODS/LIQUIDS WITHOUT DIFFICULTY
TRANSFERRING: 0-->INDEPENDENT
COGNITION: 0 - NO COGNITION ISSUES REPORTED
BATHING: 0-->INDEPENDENT
AMBULATION: 1-->ASSISTIVE EQUIPMENT
RETIRED_EATING: 0-->INDEPENDENT
DRESS: 0-->INDEPENDENT
TOILETING: 0-->INDEPENDENT
FALL_HISTORY_WITHIN_LAST_SIX_MONTHS: NO

## 2020-06-26 ASSESSMENT — MIFFLIN-ST. JEOR: SCORE: 1617.5

## 2020-06-26 NOTE — LETTER
Health Information Management Services               Recipient:    [facility]       Sender:    Katharine Rodriguez RN, BSN  Care Coordinator, 8A  Phone [phone]  Pager [phone]        Date: June 29, 2020  Patient Name:  Ortega Burroughs  Routing Message:      Demographics      The documents accompanying this notice contain confidential information belonging to the sender.  This information is intended only for the use of the individual or entity named above.  The authorized recipient of this information is prohibited from disclosing this information to any other party and is required to destroy the information after its stated need has been fulfilled, unless otherwise required by state law.      If you are not the intended recipient, you are hereby notified that any disclosure, copy, distribution or action taken in reliance on the contents of these documents is strictly prohibited.  If you have received this document in error, please return it by fax to [phone] with a note on the cover sheet explaining why you are returning it (e.g. not your patient, not your provider, etc.).  If you need further assistance, please call [employer] Centralized Transcription at [phone].  Documents may also be returned by mail to [employer], , [facility] [address], [place], [place] [address].

## 2020-06-26 NOTE — OP NOTE
DATE OF SURGERY: 6/26/2020    PREOPERATIVE DIAGNOSIS: Lumbar radiculopathy              POSTOPERATIVE DIAGNOSIS: Same    PROCEDURES:  1. Petersen Gee Osteotomy Lumbar 5 to Sacral 1 for complete neurologic decompression and regional deformity correction.  2. Transforaminal lumbar interbody fusion at L5-S1, anterior and posterior fusion through a single approach.  3. L5-S1 Capstone PEEK Cage.  4. L5 through S1 Posterior Spinal Fusion.  5. L5 through S1 Posterior Spinal Instrumentation, Medtronic Solera.  6. Fraser and use of Iliac Crest Autograft, which was harvested through the same skin incision but a separate fascial incision.  7. Use of O-Arm navigational guidance.  8. Use of Allograft    PRIMARY SURGEON: Dmitri Waldrop MD    FIRST ASSISTANT: Robert Nichols, PGY4.    ANESTHESIA: General Endotracheal    COMPLICATIONS:  None.    SPECIMENS: None.    ESTIMATED BLOOD LOSS: 50 mL    INDICATIONS:                          Ortega Burroughs is a 40 year old male who elected surgical treatment, and understood the indications for this surgery, as well as its risks, benefits, and alternatives as documented in the pre-operative H&P.  Specifically, we reviewed the risks and benefits of the surgery in detail. The risks include, but are not limited to, the general risks associated with anesthesia, including death, pulmonary embolism, DVT, stroke, myocardial infarction, pneumonia, and urinary tract infection. Additional risks specific to the surgery include the risk of infection, failure to heal (non-union), dural tear with resultant CSF leak which might necessitate placement of a drain or revision surgery or could result in headaches, nerve injury resulting in weakness or paralysis, risk of adjacent segment disease, the risks of vascular injury, need for revision surgery in the future due to one of the above issues, or risk of incomplete symptom relief. Ortega Burroughs understands the risks of the surgery and wishes  to proceed. No guarantees were given.    Of note, Ortega is had 2 previous microdiscectomies.  He did have an MRI showing residual lateral recess stenosis, but he had really disabling symptoms, he was basically bedbound and was developing an ischio sacral ulcer from not been moving very much, due to severe right leg radicular symptoms.  Although his imaging findings were mild, and he is already had 2 attempted discectomies, and I discussed doing a TLIF to resect any of the remaining ventral disc material that might of been impinging the nerve as well as to skeletonize it and make sure there was no loose disc fragment that had not been identified at the time of the discectomy.  I felt this was reasonable given his severe disability and inability to mobilize.  However I I counseled him there was a chance that the nerve may not recover or that there could be some type of chronic nerve pain or scarring that even after this subsequent procedure might not get better.    DESCRIPTION OF PROCEDURE:           Ortega Burroughs was taken to the operating room, where the Anesthesiology Service induced satisfactory general anesthesia. Ancef was given IV.  Venous thromboembolic prophylaxis was performed with sequential devices.  A Murray catheter was placed under standard sterile techniques.  The patient was placed prone on an open OSI frame with the abdomen hanging free and all bony prominences well padded.  The low back was then prepped and draped in its entirety in the usual sterile fashion. We then held a multidisciplinary time out in which we verified the patient, procedure, antibiotics, and operative plan.  All team members were in agreement.    The O-Arm was brought in and draped.  We took a pre-operative needle localization x-ray using fluroscopy, which helped to guide our incision.  The incision was carried out from L5 to S1 with subperiosteal dissection out to the tips of the transverse processes. Intraoperative  radiographic localization of the levels was again confirmed using fluoroscopy.      I also identified the previous right laminotomy defect and was careful to elevate the scar off of this area.    I harvested iliac crest autograft from the patient's right  side through the same skin incision.  I incised the fascia overlying the PSIS and elecated it sub-periosteally down the outer table of the ilium for a distance of approximately 5cm.  A Juanita retractor was then placed over the outer table to maintain visualization.  An osteotome was used to make two vertical cuts 3 cm apart, followed by a transverse cut that split the innner and outer tables of the ilium.  This removed the outer cortex, which was saved.  I then used a combination of currettes to harvest the needed amount of cancellous autograft.  Hemostasis was achieved, the wound was thoroughly irrigated, and I then closed the fascia using vicryl sutures.  I did back fill the defect with allograft and vanco powder prior to closure.    We then turned our attention to the placement of pedicle screws.  The reference frame was attached to the spinous process at L5.  We then performed our O-Arm spin.  After appropriate verification, the screws were placed at each level bilaterally from S1 through L5 in the following fashion: We used the clawfoot to identify the starting point.  A high-speed bur was used to penetrate the dorsal cortex.  We then used the gold handle pedicle probe to cannulate the pedicle while referencing the navigation screen for guidance.  The navigation system was used to measure the pedicle diameter and length, and an appropriate Medtronic Solera screw size was chosen. We under tapped 1mm.  This continued bilaterally at each level until all screw tracts were prepared.      The left transverse processes of L5 through S1 were then thoroughly decorticated with a bur, and packed with Iliac Crest Autograft and Allograft, thereby, completing the  posterolateral fusion.  The screws were then placed at each level bilaterally.      The O-Arm was brought back in and we performed a verification spin.  The resultant CT showed the instrumentation to be completely intra-osseous with no obvious breaches or malposition.  This also verified that the correct level had been operated upon.     I then turned my attention to the Smith-Jackson osteotomy and neurologic decompression.  I started on the left side where he had not had previous surgery.  I used a curette to undercut the lamina and detach it from the ligamentum flavum.  I used an osteotome to remove the inferior articular process.  I then used a rondure to take down the superior articular process.  I then identified the medial border of the remaining lateral recess, and detach the remaining ligamentum flavum and removed it piecemeal with a 4 mm Kerrison.  At this point the pedicle was skeletonized and I could see the traversing S1 nerve root on the patient's left.  I mobilized this off the disc space, coagulated some light bleeding, and then entered the left-sided disc.  I thoroughly cleaned it out with a combination of pituitaries and scrapers and then inserted a turn and rotate distractor.    I then turned my attention to the right side where he had the 2 previous discectomies.  There is a modest amount of epidural scarring but I was able to detach this using a curved curette.  I then used an osteotome to take down the inferior articular process.  I then worked in the lateral recess again with a curette and was able to identify the traversing S1 nerve root.  There was quite a bit of scar tissue and it looks fairly inflamed.  I then used a 4 mm Kerrison to take down the supra articular process flush with the pedicle.  I want to make sure that the traversing nerve root was completely free.  So at this point I did then bur about 4 mm of the medial S1 pedicle.  I used an osteotome to resect this.  I then continued  with a 4 mm Kerrison down below the S1 pedicle and traced the S1 nerve root out until it passed below the inferior border.  I could visualize the entire root.  Again there was some scar on it but I did not see any large or additional disc fragments.    I then turned my attention to the discectomy on the right side.  I mobilized the shoulder of the nerve root and at this point it did seem that there was a significant hard disc bulge underneath the shoulder of the root.  The nerve seemed fairly stuck to it.  I was eventually able to mobilize it medially.  I then used an osteotome and I resected this hardened material and cut it down and away into the disc.  I then thoroughly cleaned out the right-sided disc.  I then serially trialed and we selected 13 x 22 mm 18 degree lordotic cages.  I packed the disc space with 6 cc of local autograft, and then inserted the cages and verified the position fluoroscopically.  This completed the interbody fusion at this level.    I then again spent quite a bit of time looking around the right S1 nerve.  I made sure there were no further loose disc fragments.  I looked above and below it.  I did also resect some of the posterior annulus underneath it really to make sure that there was just nothing bulging or pushing on it in any location.  I could see the entire nerve from its shoulder all the way exiting below the S1 pedicle.  It was completely free.    I then inserted the bilateral rods.  I used a compressor and then finally tightened the setscrews after the compression, which allowed me to close down the osteotomy site and complete the regional lordosis correction.  I was quite happy with the regional alignment on fluoroscopic imaging.    I then decorticated the remaining midline laminar bone, primarily on the patient's left where there was still some residual lamina, and then packed in the remaining 15 cc of local autograft and 30 cc of crushed cancellus allograft to complete the  fusion.    We had irrigated about a liter throughout the case.  I then placed 2g of vancomycin powder in the wound.    We closed the wound in multiple layers of interrupted Vicryl, with monocryl and dermabond for the skin.  Upon closure, the patient was then transferred to a hospital bed and taken to recovery after extubation in stable condition.    I was present and scrubbed for the entire procedure.    Dmitri Waldrop MD

## 2020-06-26 NOTE — ANESTHESIA CARE TRANSFER NOTE
Patient: Ortega Pride Manjeet    Procedure(s):  Lumbar 5 to Sacral 1 instrumented posterior interbody fusion and mchugh peters osteotomy with O-Arm/Stealth Navigation, use of allograft, local autograft  and possible iliac crest autograft    Diagnosis: Lumbar radiculopathy [M54.16]  Diagnosis Additional Information: No value filed.    Anesthesia Type:   General     Note:  Airway :Face Mask  Patient transferred to:PACU  Comments: Patient transferred to PACU.  Monitors attached.  VSS.  Transfer of care with report to RN.    Magda Clayton CRNAHandoff Report: Identifed the Patient, Identified the Reponsible Provider, Reviewed the pertinent medical history, Discussed the surgical course, Reviewed Intra-OP anesthesia mangement and issues during anesthesia, Set expectations for post-procedure period and Allowed opportunity for questions and acknowledgement of understanding      Vitals: (Last set prior to Anesthesia Care Transfer)    CRNA VITALS  6/26/2020 1334 - 6/26/2020 1417      6/26/2020             Pulse:  95    SpO2:  99 %                Electronically Signed By: EFRAÍN Cooper CRNA  June 26, 2020  2:17 PM

## 2020-06-26 NOTE — BRIEF OP NOTE
[facility], [place]    Brief Operative Note    Pre-operative diagnosis: Lumbar radiculopathy [M54.16]  Post-operative diagnosis Same as pre-operative diagnosis    Procedure: Procedure(s):  Lumbar 5 to Sacral 1 instrumented posterior interbody fusion and mchugh peters osteotomy with O-Arm/Stealth Navigation, use of allograft, local autograft  and possible iliac crest autograft  Surgeon: Surgeon(s) and Role:  Panel 1:     * Dmitri Waldrop MD - Primary     * Robert Simpson MD - Resident - Assisting  Panel 2:     * Dmitri Waldrop MD - Primary  Anesthesia: General   Estimated blood loss: Less than 100 ml  Drains: Hemovac  Specimens: * No specimens in log *  Findings:   See dictated operative report  Complications: None.  Implants:   Implant Name Type Inv. Item Serial No.  Lot No. LRB No. Used Action   GRAFT BONE CRUSH CANC 30ML [id number] Bone/Tissue/Biologic GRAFT BONE CRUSH CANC 30ML 910153 [id number] MUSCULOSKELETAL MIRANDA  N/A 1 Implanted   IMP SCR SET MEDT SOLERA BREAK OFF 5.5MM TI [id number] Metallic Hardware/[place] IMP SCR SET MEDT SOLERA BREAK OFF 5.5MM TI [id number]  [employer]  N/A 4 Implanted   IMP SCR MEDT 5.5/6.0MM SOLERA 7.5X50MM MA [id number] Metallic Hardware/[place] IMP SCR MEDT 5.5/6.0MM SOLERA 7.5X50MM MA [id number]  [employer]  N/A 1 Implanted   IMP SCR MEDT 5.5/6.0MM SOLERA 7.5X55MM MA [id number] Metallic Hardware/[place] IMP SCR MEDT 5.5/6.0MM SOLERA 7.5X55MM MA [id number]  [employer]  N/A 2 Implanted   GRAFT BONE CRUSH CANC 30ML [id number] Bone/Tissue/Biologic GRAFT BONE CRUSH CANC 30ML [id number] MUSCULOSKELETAL MIRANDA  N/A 1 Implanted   IMP SCR MEDT 5.5/6.0MM SOLERA 7.5X45MM MA [id number] Metallic Hardware/[place] IMP SCR MEDT 5.5/6.0MM SOLERA 7.5X45MM MA [id number]  [employer]  N/A 1 Implanted   IMP SPACER MEDT CAPSTONE CONTROL [id number] 18DEG [id number] Metallic Hardware/[place] IMP SPACER MEDT CAPSTONE CONTROL  [id number] 18DEG [id number]  [employer] [id number] N/A 2 Implanted   IMP KIRSTIE MEDT SOLERA CVD 5.5X40MM CHR [id number] Metallic Hardware/[place] IMP KIRSTIE MEDT SOLERA CVD 5.5X40MM CHR [id number]  [employer]  N/A 1 Implanted   IMP KIRSTIE MEDT SOLERA CVD 5.5X35MM CHR [id number] Metallic Hardware/[place] IMP KIRSTIE MEDT SOLERA CVD 5.5X35MM CHR [id number]  [employer]  N/A 1 Implanted       Ortho Primary  Activity: Up with assist until independent. No excessive bending or twisting. No lifting >10 lbs x 6 weeks. No Makayla lift for transfers.   Weight bearing status: WBAT.  Pain management: Transition from IV to PO as tolerated. No NSAIDs Lidocaine gtt x24 hours  Antibiotics: Ancef x24 hours  Diet: Begin with clear fluids and progress diet as tolerated.   DVT prophylaxis: SCDs only. No chemical DVT ppx needed.  Imaging: XR Lumbar spine PTDC - ordered.  Labs: labs PRN  Bracing/Splinting: None  Dressings: Keep dressing c/d/i x7 days.  Drains: Document output per shift, will be discontinued at Orthopedic Surgery discretion.  Murray catheter: Remove POD#1.   Physical Therapy/Occupational Therapy: Eval and treat.  Consults: Hospitalist.  Follow-up: Clinic with Dr. Waldrop in 6 weeks with repeat x-rays.   Disposition: Pending progress with therapies, pain control on orals, and medical stability.    Robert Simpson MD  Orthopaedic Surgery PGY-4  #: [phone]

## 2020-06-26 NOTE — LETTER
Health Information Management Services               Recipient:  [facility]        Sender:  Katharine Rodriguez RN, BSN  Care Coordinator, 8A  Phone [phone]  Pager [phone]          Date: June 30, 2020  Patient Name:  Ortega Burroughs  Routing Message:      Discharge orders        The documents accompanying this notice contain confidential information belonging to the sender.  This information is intended only for the use of the individual or entity named above.  The authorized recipient of this information is prohibited from disclosing this information to any other party and is required to destroy the information after its stated need has been fulfilled, unless otherwise required by state law.      If you are not the intended recipient, you are hereby notified that any disclosure, copy, distribution or action taken in reliance on the contents of these documents is strictly prohibited.  If you have received this document in error, please return it by fax to [phone] with a note on the cover sheet explaining why you are returning it (e.g. not your patient, not your provider, etc.).  If you need further assistance, please call [place] Centralized Transcription at [phone].  Documents may also be returned by mail to [employer], , [facility] [address], [place], [place] [address].

## 2020-06-26 NOTE — LETTER
Health Information Management Services               Recipient:  [facility]         Sender:  Katharine Rodriguez RN, BSN  Care Coordinator, 8A  Phone [phone]  Pager [phone]          Date: June 29, 2020  Patient Name:  Ortega Burroughs  Routing Message:            The documents accompanying this notice contain confidential information belonging to the sender.  This information is intended only for the use of the individual or entity named above.  The authorized recipient of this information is prohibited from disclosing this information to any other party and is required to destroy the information after its stated need has been fulfilled, unless otherwise required by state law.      If you are not the intended recipient, you are hereby notified that any disclosure, copy, distribution or action taken in reliance on the contents of these documents is strictly prohibited.  If you have received this document in error, please return it by fax to [phone] with a note on the cover sheet explaining why you are returning it (e.g. not your patient, not your provider, etc.).  If you need further assistance, please call [place] Centralized Transcription at [phone].  Documents may also be returned by mail to [employer], , [facility] [address], [place], [place] [address].

## 2020-06-26 NOTE — OR NURSING
PACU to Inpatient Nursing Handoff    Patient Ortega Burroughs is a 40 year old male who speaks English.   Procedure Procedure(s):  Lumbar 5 to Sacral 1 instrumented posterior interbody fusion and mchugh peters osteotomy with O-Arm/Stealth Navigation, use of allograft, local autograft  and possible iliac crest autograft   Surgeon(s) Primary: Dmitri Waldrop MD  Resident - Assisting: Robert Simpson MD     Allergies   Allergen Reactions     Metoclopramide Itching, Anaphylaxis, Cramps and Rash     Bupropion      Other reaction(s): IRREGULAR HEART RATE     Diclofenac GI Disturbance     Other reaction(s): Indigestion     Gabapentin      Other reaction(s): Depression, Depressive disorder     Meloxicam GI Disturbance       Isolation  [unfilled]     Past Medical History   has a past medical history of Anxiety, Arthritis, GERD (gastroesophageal reflux disease), Lumbar radiculopathy, and PONV (postoperative nausea and vomiting).    Anesthesia General   Dermatome Level     Preop Meds acetaminophen (Tylenol) - time given: 0941  Valium 5mg IV - time given: 0910   Versed 1.5mg - time given: 1000   Nerve block Not applicable   Intraop Meds   dexamethasone (Decadron)  fentanyl (Sublimaze): 200 mcg total  ondansetron (Zofran): last given at 4   Local Meds Yes   Antibiotics cefazolin (Ancef) - last given at 1230     Pain Patient Currently in Pain: yes  Comfort: intolerable  Pain Control: inadequate pain control   PACU meds  fentanyl (Sublimaze): 200 mcg (total dose) last given at 1500   hydromorphone (Dilaudid): 1.5 mg (total dose) last given at 1545   oxycodone (Roxicodone): 10 mg (total dose) last given at 1545   Valium PO 5mg last given at 1545      PCA / epidural Lido gtt at 9.3ml/hr   Capnography  Yes   Telemetry ECG Rhythm: Normal sinus rhythm   Inpatient Telemetry Monitor Ordered? No        Labs Glucose Lab Results   Component Value Date    GLC 81 06/24/2020       Hgb Lab Results   Component Value Date    HGB  15.9 06/24/2020       INR No results found for: INR   PACU Imaging Not applicable     Wound/Incision Incision/Surgical Site 03/09/20 Back (Active)   Number of days: 109       Incision/Surgical Site 06/04/20 Posterior Back (Active)   Incision Assessment UT 06/26/20 1507   Closure Sutures;Liquid bandage 06/26/20 1332   Incision Drainage Amount None 06/04/20 1240   Drainage Description UT 06/26/20 1408   Dressing Intervention Clean, dry, intact 06/26/20 1507   Number of days: 22      CMS        Equipment ice pack   Other LDA       IV Access Peripheral IV 06/26/20 Right;Dorsal Hand (Active)   Site Assessment WDL 06/26/20 1500   Line Status Infusing 06/26/20 1500   Phlebitis Scale 0-->no symptoms 06/26/20 1500   Infiltration Scale 0 06/26/20 1408   Extravasation? No 06/26/20 1408   Dressing Intervention New dressing  06/26/20 0900   Number of days: 0      Blood Products Not applicable EBL 50 mL   Intake/Output Date 06/26/20 0700 - 06/27/20 0659   Shift 6718-5317 0593-3547 5019-4041 24 Hour Total   INTAKE   I.V. 1700   1700   Shift Total(mL/kg) 1700(22.88)   1700(22.88)   OUTPUT   Urine 225   225   Blood 50   50   Shift Total(mL/kg) 275(3.7)   275(3.7)   Weight (kg) 74.3 74.3 74.3 74.3      Drains / Murray Closed/Suction Drain 1 Posterior Back Accordion 10 Greek (Active)   Site Description [facility] 06/26/20 1500   Dressing Status Normal: Clean, Dry & Intact 06/26/20 1500   Drainage Appearance Bloody/Bright Red 06/26/20 1500   Status To bulb suction 06/26/20 1500   Number of days: 0       Urethral Catheter Latex 16 fr (Active)   Collection Container Standard;Patent 06/26/20 1500   Securement Method Securing device (Describe) 06/26/20 1500   Number of days: 0      Time of void PreOp Void Prior to Procedure: 0600 (06/26/20 0921)    PostOp  Murray cath    Diapered? No   Bladder Scan     PO    tolerating sips, crackers, water and ice chips     Vitals    B/P: 120/77  T: 98.4  F (36.9  C)    Temp src: Oral  P:  Pulse: 90 (06/26/20  1500)    Heart Rate: 89 (06/26/20 1500)     R: 8  O2:  SpO2: 98 %    O2 Device: Nasal cannula (06/26/20 1500)    Oxygen Delivery: 3 LPM (06/26/20 1500)         Family/support present mother and father: updated via phone   Patient belongings     Patient transported on bed   DC meds/scripts (obs/outpt) Not applicable   Inpatient Pain Meds Released? Yes       Special needs/considerations None   Tasks needing completion None       SANFORD SEO RN  ASCOM [id number]

## 2020-06-26 NOTE — DISCHARGE SUMMARY
"[facility], [place]  Orthopaedic Discharge Summary    Patient: Ortega Burroughs MRN# [id number]   Age/Sex: 40 year old male YOB: 1980      Date of Admission:  6/26/2020  Date of Discharge:  6/30/2020 11:23 AM  Admitting Physician:  Dmitri Waldrop*  Discharge Physician:  Robert Simpson MD  Primary Care Provider:  No Ref-Primary, Physician  Discharge location         Home    DISCHARGE DIAGNOSIS:    Lumbar radiculopathy [M54.16]    PROCEDURE(S):   6/26/2020 Procedure(s):  Lumbar 5 to Sacral 1 instrumented posterior interbody fusion and mchugh peters osteotomy with O-Arm/Stealth Navigation, use of allograft, local autograft, and iliac crest autograft     BRIEF HISTORY:     Mr. Burroughs is a 40 year-old gentleman with a history of right-sided lumbar radiculopathy and previous R L5-S1 microdiscectomy in March 2020 with persistent pain and dysfunction in his low back and right lower leg.  He was seen by Dr. Waldrop virtually on 6/19/2020 when repeat surgery was decided upon.  From Dr. Waldrop's note on that date,    \"Leg is basically unchanged.  He is able to find a position of comfort if he flexes his knees up to his chest.  Otherwise he still having significant pain in the leg.  He is walking with a cane.  When I saw him on Tuesday he was basically 4 out of 5 strength all throughout the leg with a significantly positive straight leg raise and we started him on prednisone and he started this on Wednesday but it has not made a ton of difference for him.... However, in talking with the patient multiple times at this point, I am convinced that he is not improving, and he cannot mobilize.  He has severe pain with diffuse weakness in the leg.  Given this, and the fact that there are no other non-operative options, we agreed to proceed with scheduling a TLIF for him with a plan for ICAG and stealth navigation.  I talked to him on the phone today about risks and he wishes " "to proceed.\"    HOSPITAL COURSE:    Surgery was uncomplicated. Postoperatively the patient was admitted to the orthopedic surgery service.  Intravenous antibiotics were provided for 24 hours after the surgery.  Medicine was consulted to help with management of medical comorbidities. Patient received routine nursing cares on the floor.  A clear liquid diet was started and subsequently advance to regular, which the patient tolerated without issue.  Stool softeners were administered while taking pain medications to prevent constipation.  The  patient was able to void independently.  Post operative xrays were obtained. Physical and occupational therapy were initiated for safety training, ADLs, mobility, and ROM exercises. Surgical drains were removed on 6/28/2020, after output was minimal.  After demonstrating the ability to tolerate a diet, pain control on oral pain medications, and evaluation by physical and occupational therapy, the patient was deemed medically safe for discharge to home with home health.    FOLLOW UP:      Future Appointments   Date Time Provider Department Center   6/27/2020  9:30 AM Abdon Cleary PT [facility] [place]   6/27/2020  1:00 PM Ronald Hamm OT [facility] [place]   6/27/2020  2:30 PM Abdon Cleary PT [facility] [place]   8/7/2020 10:20 AM Dmitri Waldrop MD [facility]   9/18/2020  1:50 PM Dmitri Waldrop MD [facility] ORTHOPEDICS - Located 4th Floor (4D ) in the Clinics and Surgery Center at [address] [place] [address].     Department Address: [address] [place] [id number]     [facility] Orthopedic Scheduling contact number: [phone]    Call if you haven't heard regarding these appointments within 7 days of discharge.      PHYSICAL EXAMINATION:    Objective:   /77   Pulse 90   Temp 98.4  F (36.9  C) (Oral)   Resp 8   Ht 1.727 m (5' 8\")   Wt 74.3 kg (163 lb 12.8 oz)   SpO2 100%   BMI " 24.91 kg/m    No intake/output data recorded.  Gen: NAD. Resting comfortably in bed  Resp: Breathing comfortably on RA  Musculoskeletal: dressing c/d/i.  Dressing changed on the day of discharge.    Lower extremities:  Motor Strength Right Left   Hip flexion: L1, L2, L3 4/5 5/5   Hip adduction: L2, L3 4/5 5/5   Knee flexion: S1 4/5 5/5   Knee extension: L3, L4 4/5 5/5   Ankle dosiflexion: L4, L5 4+/5 5/5   EHL: L5 4+/5 5/5   Ankle plantarflexion: S1 5/5 5/5     Sensation from L1-S2 is preserved.      PLANNED DISCHARGE ORDERS:          Discharge Procedure Orders   Home care nursing referral   Referral Priority: Routine Referral Type: Home Health Therapies & Aides   Number of Visits Requested: 1     Home Care PT Referral for Hospital Discharge   Referral Priority: Routine Referral Type: Home Health Therapies & Aides   Number of Visits Requested: 1     Reason for your hospital stay   Order Comments: Spine surgery     Follow Up and recommended labs and tests   Order Comments: Follow up your treatment team in 6 weeks, please call to schedule this appointment.     Activity   Order Comments: Please, refrain from excessive twisting, bending, or lifting with the waist.  You have a 10 pound weightbearing limit restriction.     Order Specific Question Answer Comments   Is discharge order? Yes      When to contact your care team   Order Comments: DISCHARGE INSTRUCTIONS:  Activity:   - You may weight bear as tolerated.  - Please avoid lifting >10 lbs, excessive bending, twisting, and strenuous activities.    Pain management:   - Please take pain medications as instructed, but it is okay to begin weaning off of them as your pain tolerates. Avoid driving while taking pain medications as they can make you drowsy.  Wound care:   - Your dressing is to remain in place until postoperative day 5 after which you may remove.   - Your stitches will dissolve on their own and do not need to be removed.   - You may shower once your dressing  is off. Let water run over the incision and dab dry -- do not rub or submerge the incision under water for at least 3 weeks.   - After the dressing is off, you may leave it open to the air. You may continue to keep the incision covered for protection.   - Please contact us if there is increased drainage, swelling, pain, or redness stemming from your incision. This may be a sign of infection and would need to be looked at immediately.  New or worsening symptoms:  - Please call or seek medical attention for pain that continues to worsen, new onset of numbness or tingling, or extreme swelling.  - Please see a medical provider for new onset of chest pain or shortness of breath. This may be a sign of a heart attack or blood clot in your lungs.   Follow-up:  - After discharge, an appointment will be made for you to return to clinic in approximately 6 weeks for a postoperative check with your surgeon.  - Please call [phone] if you have any questions or concerns.  - Appointments are at [facility]: [address] [place] [id number]     Wound care and dressings   Order Comments: Please keep wound covered first 5 days after surgery.  Afterwards wound can be left open to air.  Once the dressing is removed you can try, but no water submersion for first 3 weeks.    If you have a waterproof dressing, such as Aquacel, you can shower at any time.  No water submersion.     Discharge Instructions   Order Comments: You will follow-up in clinic in 6 weeks     Miscellaneous DME Order   Order Comments: DME Documentation:   Describe the reason for need to support medical necessity: Limited ROM/spinal precautions impacting independence in ADLs/mobility.     I, the undersigned, certify that the above prescribed supplies are medically necessary for this patient and is both reasonable and necessary in reference to accepted standards of medical and necessary in reference to accepted standards  of medical practice in the treatment of this patient's condition and is not prescribed as a convenience.     Order Specific Question Answer Comments   DME Provider: [facility]    DME Item Needed: commode, shower chair, reacher, sock aid, LH shoe horn    Length of Need: 12 weeks      Walker   Order Comments: DME Documentation:   Describe the reason for need to support medical necessity: gait instability.     I, the undersigned, certify that the above prescribed supplies are medically necessary for this patient and is both reasonable and necessary in reference to accepted standards of medical and necessary in reference to accepted standards of medical practice in the treatment of this patient's condition and is not prescribed as a convenience.     Order Specific Question Answer Comments   DME Provider: [facility]    Walker Type: Standard (2 Wheel)    Accessories: Walker Wheels      Diet   Order Comments: Follow this diet upon discharge: regular diet     Order Specific Question Answer Comments   Is discharge order? Yes      Assign Questionnaire Series to Patient     Patient was discussed with Dr. Waldrop on the day of discharge.    Robret Simpson MD  Orthopaedic Surgery PGY-4  #: [phone]

## 2020-06-26 NOTE — PHARMACY
Admission medication history interview status for the 6/26/2020 admission is complete. See Epic admission navigator for allergy information, pharmacy, prior to admission medications and immunization status.     Medication history interview sources:  patient     Changes made to PTA medication list (reason)  Added: none   Deleted: Depakote and Zofran--pt doesn't take it any longer   Changed: Senna and Miralax are PRN     Additional medication history information (including reliability of information, actions taken by pharmacist):None      Prior to Admission medications    Medication Sig Last Dose Taking? Auth Provider   diazepam (VALIUM) 5 MG tablet Take 1 tablet (5 mg) by mouth every 6 hours as needed for muscle spasms or pain 6/25/2020 at Unknown time Yes Jami Torres PA-C   docusate sodium (COLACE) 100 MG capsule Take 1 capsule (100 mg) by mouth 2 times daily 6/25/2020 at 2000 Yes Shukri Murray PA-C   HYDROcodone-acetaminophen (NORCO) 5-325 MG tablet Take 1-2 tablets by mouth every 4 hours as needed for moderate to severe pain 6/26/2020 at 0600 Yes Jami Torres PA-C   Lidocaine (LIDOCARE) 4 % Patch Place 1 patch onto the skin daily To prevent lidocaine toxicity, patient should be patch free for 12 hrs daily.  6/26/2020 at 0600 Yes Reported, Patient   medical cannabis (Patient's own supply) 1 Dose by Other route See Admin Instructions (The purpose of this order is to document that the patient reports taking medical cannabis.  This is not a prescription, and is not used to certify that the patient has a qualifying medical condition.)     Edible marijuana oil - 1 teaspoon twice daily   From [place] state program. 6/25/2020 at 1800 Yes Reported, Patient   omeprazole (PRILOSEC) 20 MG DR capsule Take 20 mg by mouth 2 times daily as needed  6/25/2020 at Unknown time Yes Reported, Patient   polyethylene glycol (MIRALAX) 17 g packet Take 17 g by mouth daily  Patient taking differently: Take 1 packet by  mouth daily as needed  Past Week at Unknown time Yes Shukri Murray PA-C   Pregabalin (LYRICA) 200 MG capsule Take 200 mg by mouth 3 times daily 6/25/2020 at Unknown time Yes Unknown, Entered By History   QUEtiapine (SEROQUEL) 100 MG tablet Take 50 mg by mouth At Bedtime 6/25/2020 at Unknown time Yes Unknown, Entered By History   senna-docusate (SENOKOT-S/PERICOLACE) 8.6-50 MG tablet Take 1-2 tablets by mouth 2 times daily  Patient taking differently: Take 1-2 tablets by mouth 2 times daily as needed  Past Week at Unknown time Yes Jami Torres PA-C         Medication history completed by: Phoebe Mckeon [facility], PharmD

## 2020-06-26 NOTE — PROGRESS NOTES
Orthopaedic Post-op Check    S:  Doing well post-operatively.  Reports pain is under good control with minimal nausea/vomiting.  No new numbness/tingling.    O:    General:  Pain is well controlled, no acute distress  Resp:  Breathing normally on room air  MSK:  Drain with minimal output  Motor:  Fires tibialis anterior, gastroc/soleus, EHL with 4+/5 strength on L, 4/5 strength on R.  Sensory: SILT to superficial peroneal, deep peroneal, saphenous, sural, and tibial nerve territories with paresthesias on the right in the L5 and S1 distributions.  Circulation: palpable DP and TP, foot warm and well perfused    A/P:    Ortega Burroughs is a 40 year old male who is POD#0 status-post L5-S1 TLIF with Dr. Waldrop.  Doing well in the immediate post-op period.    Continue cares as ordered.    Robert Simpson MD  Orthopaedic Surgery PGY-4  #: [phone]

## 2020-06-26 NOTE — CONSULTS
[facility], [place]    Hospitalist Consultation    Date of Admission:  6/26/2020  Date of Consult (When I saw the patient): 06/26/20    Assessment & Plan      Ortega Burroughs is a 40 year old male who was admitted on 6/26/2020.     40 year old male with Hx of GERD, PTSD, Anxiety, Panic disorder, insomnia, tobacco use disorder. He had micro discectomy by Dr Waldrop in March 2020. For recurrence of symptoms he underwent Lumbar 5 to Sacral 1 instrumented posterior interbody fusion and mchugh peters osteotomy by Dr Waldrop on 06/26/20.    #  Lumbar 5 to Sacral 1 instrumented posterior interbody fusion and mchugh peters osteotomy by Dr Waldrop on 06/26/20.  -per primary team  -hydrate well  -good IS  -ankle pumps  -pain and DVT prophylaxis management per orthopedics  -uses Lyrica for chronic pain. Ct that.   -on lidocaine drip  # PONV: Add scopolamine patch. For breakthrough nausea use. Prn Zofran or compazine.   # GERD: Ct PTA omeprazole  # PTSD, Anxiety, Panic disorder, insomnia: Ct PTA Valium prn and seroquil at hs  # Tobacco use disorder. Nicotine patches prn. Smokes less than 0.5 PPD.     DVT Prophylaxis: Defer to primary service  Code Status: Full Code    Disposition: per primary team    Carol Peraza MD     Reason for Consult   Reason for consult: I was asked by Dr Waldrop to evaluate this patient for post op medical comanagement.    Primary Care Physician   Physician No Ref-Primary    Chief Complaint   SP spine surgery    History is obtained from the patient    History of Present Illness      Ortega Burroughs is a 40 year old male with Hx of GERD, PTSD, Anxiety, Panic disorder, insomnia, tobacco use disorder. He had micro discectomy by Dr Waldrop in March 2020. He felt better initially but now his symptoms have come back. He has radiculopathy with back pain. So he underwent Lumbar 5 to Sacral 1 instrumented posterior interbody fusion and mchugh peters osteotomy by Dr Waldrop on  06/26/20. Post op medicine consulted for co management.     He is doing well. He has Ho PONV. Right now doing fine. No chest pain or sob. No cough or phlegm. NO fevers.     Past Medical History      I have reviewed this patient's medical history and updated it with pertinent information if needed.     Past Medical History:   Diagnosis Date     Anxiety      Arthritis      GERD (gastroesophageal reflux disease)      Lumbar radiculopathy      PONV (postoperative nausea and vomiting)        Past Surgical History   I have reviewed this patient's surgical history and updated it with pertinent information if needed.  Past Surgical History:   Procedure Laterality Date     APPENDECTOMY       CHOLECYSTECTOMY  2009     DISCECTOMY LUMBAR POSTERIOR MICROSCOPIC ONE LEVEL Right 3/9/2020    Procedure: Right Lumbar 5 to Sacral 1 microdiscectomy;  Surgeon: Dmitri Waldrop MD;  Location: [facility] OR     DISCECTOMY LUMBAR POSTERIOR MICROSCOPIC ONE LEVEL Right 6/4/2020    Procedure: Revision Right Lumbar 5-Sacral 1 Microdiscectomy;  Surgeon: Dmitri Waldrop MD;  Location: [facility] OR     HERNIA REPAIR  2005     KNEE SURGERY      3 on left knee, 1 on right knee (arthroscopic)       Prior to Admission Medications   Prior to Admission Medications   Prescriptions Last Dose Informant Patient Reported? Taking?   HYDROcodone-acetaminophen (NORCO) 5-325 MG tablet 6/26/2020 at 0600  No Yes   Sig: Take 1-2 tablets by mouth every 4 hours as needed for moderate to severe pain   Lidocaine (LIDOCARE) 4 % Patch 6/26/2020 at 0600  Yes Yes   Sig: Place 1 patch onto the skin daily To prevent lidocaine toxicity, patient should be patch free for 12 hrs daily.    Pregabalin (LYRICA) 200 MG capsule 6/25/2020 at Unknown time  Yes Yes   Sig: Take 200 mg by mouth 3 times daily   QUEtiapine (SEROQUEL) 100 MG tablet 6/25/2020 at Unknown time  Yes Yes   Sig: Take 50 mg by mouth At Bedtime   diazepam (VALIUM) 5 MG tablet 6/25/2020 at Unknown time  No  Yes   Sig: Take 1 tablet (5 mg) by mouth every 6 hours as needed for muscle spasms or pain   docusate sodium (COLACE) 100 MG capsule 2020 at 2000  No Yes   Sig: Take 1 capsule (100 mg) by mouth 2 times daily   medical cannabis (Patient's own supply) 2020 at 1800  Yes Yes   Si Dose by Other route See Admin Instructions (The purpose of this order is to document that the patient reports taking medical cannabis.  This is not a prescription, and is not used to certify that the patient has a qualifying medical condition.)     Edible marijuana oil - 1 teaspoon twice daily   From [place] state program.   omeprazole (PRILOSEC) 20 MG DR capsule 2020 at Unknown time  Yes Yes   Sig: Take 20 mg by mouth 2 times daily as needed    polyethylene glycol (MIRALAX) 17 g packet Past Week at Unknown time  No Yes   Sig: Take 17 g by mouth daily   Patient taking differently: Take 1 packet by mouth daily as needed    senna-docusate (SENOKOT-S/PERICOLACE) 8.6-50 MG tablet Past Week at Unknown time  No Yes   Sig: Take 1-2 tablets by mouth 2 times daily   Patient taking differently: Take 1-2 tablets by mouth 2 times daily as needed       Facility-Administered Medications: None     Allergies   Allergies   Allergen Reactions     Metoclopramide Itching, Anaphylaxis, Cramps and Rash     Bupropion      Other reaction(s): IRREGULAR HEART RATE     Diclofenac GI Disturbance     Other reaction(s): Indigestion     Gabapentin      Other reaction(s): Depression, Depressive disorder     Meloxicam GI Disturbance       Social History   I have reviewed this patient's social history and updated it with pertinent information if needed. Ortega Burroughs  reports that he has been smoking. He has a 12.50 pack-year smoking history. His smokeless tobacco use includes chew. He reports previous alcohol use. He reports current drug use. Drug: Marijuana.    Family History   I have reviewed this patient's family history and updated it with pertinent  information if needed.   Family History   Problem Relation Age of Onset     Chronic Obstructive Pulmonary Disease Mother      Hypertension Father        Review of Systems   The 10 point Review of Systems is negative other than noted in the HPI or here.     Physical Exam   Temp: 98.5  F (36.9  C) Temp src: Oral BP: 114/83 Pulse: 79 Heart Rate: 86 Resp: (!) 7 SpO2: 97 % O2 Device: Nasal cannula Oxygen Delivery: 2 LPM  Vital Signs with Ranges  Temp:  [97.9  F (36.6  C)-98.5  F (36.9  C)] 98.5  F (36.9  C)  Pulse:  [73-95] 79  Heart Rate:  [77-95] 86  Resp:  [0-29] 7  BP: ()/(65-88) 114/83  SpO2:  [97 %-100 %] 97 %  163 lbs 12.83 oz    Constitutional: Awake, alert, cooperative, no apparent distress.  Eyes: Conjunctiva and pupils examined and normal.  HEENT: Moist mucous membranes, normal dentition.  Respiratory: Clear to auscultation bilaterally, no crackles or wheezing.  Cardiovascular: Regular rate and rhythm, normal S1 and S2, and no murmur noted.  GI: Soft, non-distended, non-tender, normal bowel sounds.  Lymph/Hematologic: No anterior cervical or supraclavicular adenopathy.  Skin: No rashes, no cyanosis, no edema.  Musculoskeletal: SP spine surgery. Wound dressed.   Neurologic: Cranial nerves 2-12 intact, normal strength and sensation.  Psychiatric: Alert, oriented to person, place and time, no obvious anxiety or depression.    Data   -Data reviewed today: All pertinent laboratory and imaging results from this encounter were reviewed. I personally reviewed no images or EKG's today.  Recent Labs   Lab 06/24/20  0920   WBC 7.2   HGB 15.9   MCV 94         POTASSIUM 4.3   CHLORIDE 109   CO2 25   BUN 17   CR 1.08   ANIONGAP 4   MIHIR 8.7   GLC 81   ALBUMIN 3.8   PROTTOTAL 7.5   BILITOTAL 0.3   ALKPHOS 84   ALT 21   AST 15       Recent Results (from the past 24 hour(s))   XR Surgery ADRIANA L/T 5 Min Fluoro    Narrative    This exam was marked as non-reportable because it will not be read by a   radiologist  or a [place] non-radiologist provider.

## 2020-06-27 ENCOUNTER — APPOINTMENT (OUTPATIENT)
Dept: OCCUPATIONAL THERAPY | Facility: CLINIC | Age: 40
DRG: 455 | End: 2020-06-27
Attending: ORTHOPAEDIC SURGERY
Payer: COMMERCIAL

## 2020-06-27 ENCOUNTER — APPOINTMENT (OUTPATIENT)
Dept: PHYSICAL THERAPY | Facility: CLINIC | Age: 40
DRG: 455 | End: 2020-06-27
Attending: ORTHOPAEDIC SURGERY
Payer: COMMERCIAL

## 2020-06-27 LAB
ANION GAP SERPL CALCULATED.3IONS-SCNC: 3 MMOL/L (ref 3–14)
BUN SERPL-MCNC: 19 MG/DL (ref 7–30)
CALCIUM SERPL-MCNC: 8.2 MG/DL (ref 8.5–10.1)
CHLORIDE SERPL-SCNC: 110 MMOL/L (ref 94–109)
CO2 SERPL-SCNC: 28 MMOL/L (ref 20–32)
CREAT SERPL-MCNC: 1.25 MG/DL (ref 0.66–1.25)
ERYTHROCYTE [DISTWIDTH] IN BLOOD BY AUTOMATED COUNT: 12 % (ref 10–15)
GFR SERPL CREATININE-BSD FRML MDRD: 71 ML/MIN/{1.73_M2}
GLUCOSE SERPL-MCNC: 104 MG/DL (ref 70–99)
HCT VFR BLD AUTO: 36.9 % (ref 40–53)
HGB BLD-MCNC: 12.4 G/DL (ref 13.3–17.7)
MCH RBC QN AUTO: 31.5 PG (ref 26.5–33)
MCHC RBC AUTO-ENTMCNC: 33.6 G/DL (ref 31.5–36.5)
MCV RBC AUTO: 94 FL (ref 78–100)
PLATELET # BLD AUTO: 143 10E9/L (ref 150–450)
POTASSIUM SERPL-SCNC: 4.2 MMOL/L (ref 3.4–5.3)
RBC # BLD AUTO: 3.94 10E12/L (ref 4.4–5.9)
SODIUM SERPL-SCNC: 141 MMOL/L (ref 133–144)
WBC # BLD AUTO: 9.1 10E9/L (ref 4–11)

## 2020-06-27 PROCEDURE — 12000001 ZZH R&B MED SURG/OB UMMC

## 2020-06-27 PROCEDURE — 99232 SBSQ HOSP IP/OBS MODERATE 35: CPT | Performed by: INTERNAL MEDICINE

## 2020-06-27 PROCEDURE — 25000132 ZZH RX MED GY IP 250 OP 250 PS 637: Performed by: STUDENT IN AN ORGANIZED HEALTH CARE EDUCATION/TRAINING PROGRAM

## 2020-06-27 PROCEDURE — 25800030 ZZH RX IP 258 OP 636: Performed by: HOSPITALIST

## 2020-06-27 PROCEDURE — 97110 THERAPEUTIC EXERCISES: CPT | Mod: GP

## 2020-06-27 PROCEDURE — 36415 COLL VENOUS BLD VENIPUNCTURE: CPT | Performed by: HOSPITALIST

## 2020-06-27 PROCEDURE — 97535 SELF CARE MNGMENT TRAINING: CPT | Mod: GO | Performed by: OCCUPATIONAL THERAPIST

## 2020-06-27 PROCEDURE — 97530 THERAPEUTIC ACTIVITIES: CPT | Mod: GP

## 2020-06-27 PROCEDURE — 97161 PT EVAL LOW COMPLEX 20 MIN: CPT | Mod: GP

## 2020-06-27 PROCEDURE — 25000132 ZZH RX MED GY IP 250 OP 250 PS 637: Performed by: INTERNAL MEDICINE

## 2020-06-27 PROCEDURE — 80048 BASIC METABOLIC PNL TOTAL CA: CPT | Performed by: HOSPITALIST

## 2020-06-27 PROCEDURE — 25000132 ZZH RX MED GY IP 250 OP 250 PS 637: Performed by: HOSPITALIST

## 2020-06-27 PROCEDURE — 85027 COMPLETE CBC AUTOMATED: CPT | Performed by: HOSPITALIST

## 2020-06-27 PROCEDURE — 25000128 H RX IP 250 OP 636: Performed by: STUDENT IN AN ORGANIZED HEALTH CARE EDUCATION/TRAINING PROGRAM

## 2020-06-27 PROCEDURE — 25000128 H RX IP 250 OP 636: Performed by: ORTHOPAEDIC SURGERY

## 2020-06-27 PROCEDURE — 97165 OT EVAL LOW COMPLEX 30 MIN: CPT | Mod: GO | Performed by: OCCUPATIONAL THERAPIST

## 2020-06-27 RX ORDER — NICOTINE 21 MG/24HR
1 PATCH, TRANSDERMAL 24 HOURS TRANSDERMAL DAILY
Status: DISCONTINUED | OUTPATIENT
Start: 2020-06-27 | End: 2020-06-30 | Stop reason: HOSPADM

## 2020-06-27 RX ORDER — HYDROMORPHONE HYDROCHLORIDE 2 MG/1
2-4 TABLET ORAL
Status: DISCONTINUED | OUTPATIENT
Start: 2020-06-27 | End: 2020-06-27

## 2020-06-27 RX ORDER — HYDROMORPHONE HYDROCHLORIDE 2 MG/1
4-6 TABLET ORAL
Status: DISCONTINUED | OUTPATIENT
Start: 2020-06-27 | End: 2020-06-29

## 2020-06-27 RX ADMIN — Medication 1 MG/KG/HR: at 13:43

## 2020-06-27 RX ADMIN — DIAZEPAM 5 MG: 5 TABLET ORAL at 15:57

## 2020-06-27 RX ADMIN — CEFAZOLIN 1 G: 1 INJECTION, POWDER, FOR SOLUTION INTRAMUSCULAR; INTRAVENOUS at 05:30

## 2020-06-27 RX ADMIN — HYDROMORPHONE HYDROCHLORIDE 4 MG: 2 TABLET ORAL at 06:10

## 2020-06-27 RX ADMIN — SODIUM CHLORIDE, POTASSIUM CHLORIDE, SODIUM LACTATE AND CALCIUM CHLORIDE: 600; 310; 30; 20 INJECTION, SOLUTION INTRAVENOUS at 14:17

## 2020-06-27 RX ADMIN — ACETAMINOPHEN 975 MG: 325 TABLET, FILM COATED ORAL at 00:49

## 2020-06-27 RX ADMIN — HYDROMORPHONE HYDROCHLORIDE 0.5 MG: 1 INJECTION, SOLUTION INTRAMUSCULAR; INTRAVENOUS; SUBCUTANEOUS at 07:53

## 2020-06-27 RX ADMIN — HYDROMORPHONE HYDROCHLORIDE 1 MG: 1 INJECTION, SOLUTION INTRAMUSCULAR; INTRAVENOUS; SUBCUTANEOUS at 10:20

## 2020-06-27 RX ADMIN — HYDROMORPHONE HYDROCHLORIDE 6 MG: 2 TABLET ORAL at 18:57

## 2020-06-27 RX ADMIN — HYDROMORPHONE HYDROCHLORIDE 0.5 MG: 1 INJECTION, SOLUTION INTRAMUSCULAR; INTRAVENOUS; SUBCUTANEOUS at 00:10

## 2020-06-27 RX ADMIN — DIAZEPAM 5 MG: 5 TABLET ORAL at 22:39

## 2020-06-27 RX ADMIN — HYDROMORPHONE HYDROCHLORIDE 0.5 MG: 1 INJECTION, SOLUTION INTRAMUSCULAR; INTRAVENOUS; SUBCUTANEOUS at 01:53

## 2020-06-27 RX ADMIN — OXYCODONE HYDROCHLORIDE 10 MG: 5 TABLET ORAL at 00:49

## 2020-06-27 RX ADMIN — HYDROMORPHONE HYDROCHLORIDE 0.5 MG: 1 INJECTION, SOLUTION INTRAMUSCULAR; INTRAVENOUS; SUBCUTANEOUS at 13:45

## 2020-06-27 RX ADMIN — PREGABALIN 200 MG: 100 CAPSULE ORAL at 13:48

## 2020-06-27 RX ADMIN — HYDROMORPHONE HYDROCHLORIDE 1 MG: 1 INJECTION, SOLUTION INTRAMUSCULAR; INTRAVENOUS; SUBCUTANEOUS at 22:36

## 2020-06-27 RX ADMIN — DOCUSATE SODIUM 50MG AND SENNOSIDES 8.6MG 2 TABLET: 8.6; 5 TABLET, FILM COATED ORAL at 20:18

## 2020-06-27 RX ADMIN — LIDOCAINE 2 PATCH: 560 PATCH PERCUTANEOUS; TOPICAL; TRANSDERMAL at 22:40

## 2020-06-27 RX ADMIN — HYDROMORPHONE HYDROCHLORIDE 1 MG: 1 INJECTION, SOLUTION INTRAMUSCULAR; INTRAVENOUS; SUBCUTANEOUS at 17:00

## 2020-06-27 RX ADMIN — PREGABALIN 200 MG: 100 CAPSULE ORAL at 09:14

## 2020-06-27 RX ADMIN — DIAZEPAM 5 MG: 5 TABLET ORAL at 03:32

## 2020-06-27 RX ADMIN — Medication 1 MG/KG/HR: at 19:02

## 2020-06-27 RX ADMIN — Medication 1 MG/KG/HR: at 02:37

## 2020-06-27 RX ADMIN — ACETAMINOPHEN 975 MG: 325 TABLET, FILM COATED ORAL at 09:07

## 2020-06-27 RX ADMIN — PREGABALIN 200 MG: 100 CAPSULE ORAL at 20:18

## 2020-06-27 RX ADMIN — OMEPRAZOLE 20 MG: 20 CAPSULE, DELAYED RELEASE ORAL at 15:58

## 2020-06-27 RX ADMIN — DIAZEPAM 5 MG: 5 TABLET ORAL at 09:14

## 2020-06-27 RX ADMIN — NICOTINE 1 PATCH: 21 PATCH, EXTENDED RELEASE TRANSDERMAL at 11:16

## 2020-06-27 RX ADMIN — HYDROMORPHONE HYDROCHLORIDE 6 MG: 2 TABLET ORAL at 15:58

## 2020-06-27 RX ADMIN — OMEPRAZOLE 20 MG: 20 CAPSULE, DELAYED RELEASE ORAL at 09:07

## 2020-06-27 RX ADMIN — HYDROMORPHONE HYDROCHLORIDE 6 MG: 2 TABLET ORAL at 09:06

## 2020-06-27 RX ADMIN — HYDROMORPHONE HYDROCHLORIDE 4 MG: 2 TABLET ORAL at 03:00

## 2020-06-27 RX ADMIN — HYDROMORPHONE HYDROCHLORIDE 1 MG: 1 INJECTION, SOLUTION INTRAMUSCULAR; INTRAVENOUS; SUBCUTANEOUS at 20:17

## 2020-06-27 RX ADMIN — QUETIAPINE FUMARATE 50 MG: 50 TABLET ORAL at 23:55

## 2020-06-27 RX ADMIN — HYDROMORPHONE HYDROCHLORIDE 0.5 MG: 1 INJECTION, SOLUTION INTRAMUSCULAR; INTRAVENOUS; SUBCUTANEOUS at 14:13

## 2020-06-27 RX ADMIN — HYDROMORPHONE HYDROCHLORIDE 6 MG: 2 TABLET ORAL at 12:38

## 2020-06-27 RX ADMIN — Medication 1 MG/KG/HR: at 08:25

## 2020-06-27 RX ADMIN — HYDROMORPHONE HYDROCHLORIDE 6 MG: 2 TABLET ORAL at 23:43

## 2020-06-27 RX ADMIN — SODIUM CHLORIDE, POTASSIUM CHLORIDE, SODIUM LACTATE AND CALCIUM CHLORIDE: 600; 310; 30; 20 INJECTION, SOLUTION INTRAVENOUS at 03:38

## 2020-06-27 RX ADMIN — ACETAMINOPHEN 975 MG: 325 TABLET, FILM COATED ORAL at 16:00

## 2020-06-27 RX ADMIN — DOCUSATE SODIUM 50MG AND SENNOSIDES 8.6MG 2 TABLET: 8.6; 5 TABLET, FILM COATED ORAL at 09:07

## 2020-06-27 NOTE — PROGRESS NOTES
[facility]    Medicine Progress Note - Hospitalist Service       Date of Admission:  6/26/2020  Assessment & Plan    Ortega Burroughs is a 40 year old male  with Hx of GERD, PTSD, Anxiety, Panic disorder, insomnia, tobacco use disorder. He had micro discectomy by Dr Waldrop in March 2020. For recurrence of symptoms he underwent Lumbar 5 to Sacral 1 instrumented posterior interbody fusion and mchugh peters osteotomy by Dr Waldrop on 06/26/20.     #  S/P Lumbar 5 to Sacral 1 instrumented posterior interbody fusion and mchugh peters osteotomy , POD #1  Management per primary team  Stop IV fluids  Consider stopping Lidocaine drip if pain is not well controlled with this. Oral dilaudid increased to 4-6 mg Q 3 hrs   Continue Lyrica for chronic pain  DVT prophylaxis with PCD's     # PONV:   Add scopolamine patch. For breakthrough nausea use. Prn Zofran or compazine.   Currently no nausea/ vomiting     # GERD:   Continue PTA omeprazole    # PTSD, Anxiety, Panic disorder, insomnia:   Continue PTA Valium prn and seroquil at bed time     # Tobacco use disorder.   Nicotine patches prn. Smokes less than 0.5 PPD.           Diet: Advance Diet as Tolerated: Regular Diet Adult  Diet    DVT Prophylaxis: Pneumatic Compression Devices  Murray Catheter: in place, indication: Anesthesia  Code Status: Full Code           Disposition Plan   Expected discharge: To be determined by primary team   Entered: Roberto Ram MD 06/27/2020, 9:12 AM       The patient's care was discussed with the Bedside Nurse and Patient.    Roberto Ram MD  Hospitalist Service  [facility]    ______________________________________________________________________    Interval History   Patient slept well overnight, but having a lot of pain this morning   Feels that the lidocaine drip is not working   Denies fever or chills  Denies chest pain or SOB  Passing gas      Data reviewed today: I reviewed all medications, new labs and imaging results over the last 24 hours. I personally reviewed no images or EKG's today.    Physical Exam   Vital Signs: Temp: 98.3  F (36.8  C) Temp src: Oral BP: 114/87 Pulse: 81 Heart Rate: 76 Resp: 10 SpO2: 98 % O2 Device: None (Room air) Oxygen Delivery: 2 LPM  Weight: 163 lbs 12.83 oz  General Appearance: Awake, alert and in moderate distress due to pain   Respiratory: Clear breath sounds bilaterally   Cardiovascular: Normal Heart sounds. No murmurs   GI: Soft, non tender. Normal bowel sounds   Skin: No bruising or bleeding   Other: Distal neurovasculars is intact. Alert and orientated X 3     Data   Recent Labs   Lab 06/27/20  0607 06/24/20  0920   WBC 9.1 7.2   HGB 12.4* 15.9   MCV 94 94   * 199    138   POTASSIUM 4.2 4.3   CHLORIDE 110* 109   CO2 28 25   BUN 19 17   CR 1.25 1.08   ANIONGAP 3 4   MIHIR 8.2* 8.7   * 81   ALBUMIN  --  3.8   PROTTOTAL  --  7.5   BILITOTAL  --  0.3   ALKPHOS  --  84   ALT  --  21   AST  --  15

## 2020-06-27 NOTE — PROGRESS NOTES
"Orthopaedic Surgery Progress Note:  06/27/2020     Subjective:   Overnight, had some increased pain. On call team was paged and patient was given PO dilaudid which provided relief. Was able to sleep without issue following PO dilaudid. Tolerating PO diet without N/V. Flatus present, no BM. Has not ambulated. Denies CP/SOB/fevers/chills. Murray in place. States RLE paraesthesias have improved post-operatively.    Objective:   /87 (BP Location: Left arm)   Pulse 81   Temp 98.7  F (37.1  C) (Oral)   Resp 10   Ht 1.727 m (5' 8\")   Wt 74.3 kg (163 lb 12.8 oz)   SpO2 97%   BMI 24.91 kg/m    I/O this shift:  In: 1390 [P.O.:200; I.V.:1190]  Out: -   Gen: NAD. Resting comfortably in bed  Resp: Breathing comfortably on 2L NC  Drain:   Drain output of 50/0 at 24/7 hours, respectively.  Musculoskeletal: dressing c/d/I.      Lower extremities:  Motor Strength Right Left   Hip flexion: L1, L2, L3 4/5 5/5   Hip adduction: L2, L3 4/5 5/5   Knee flexion: S1 4/5 5/5   Knee extension: L3, L4 4/5 5/5   Ankle dosiflexion: L4, L5 4+/5 5/5   EHL: L5 4+/5 5/5   Ankle plantarflexion: S1 4+/5 5/5     Sensation from L1-S2 is preserved.    Labs:  Lab Results   Component Value Date    WBC 7.2 06/24/2020    HGB 15.9 06/24/2020     06/24/2020        Assessment & Plan:   Ortega Burroughs is a 40 year old male with PMH including GERD, PTSD, Anxiety, panic disorder and insomnia now s/p L5-S1 on 6/26 with Dr. Waldrop.     Goals for today:  - Murray out  - Up and out of bed, work with PT/OT  - Pain control    Ortho Primary  Activity: Up with assist until independent. No excessive bending or twisting. No lifting >10 lbs x 6 weeks. No Makayla lift for transfers.   Weight bearing status: WBAT.  Pain management: Transition from IV to PO as tolerated. No NSAIDs Lidocaine gtt x24 hours  Antibiotics: Ancef x24 hours  Diet: Begin with clear fluids and progress diet as tolerated.   DVT prophylaxis: SCDs only. No chemical DVT ppx " needed.  Imaging: XR Lumbar spine PTDC - ordered.  Labs: labs PRN  Bracing/Splinting: None  Dressings: Keep dressing c/d/i x7 days.  Drains: Document output per shift, will be discontinued at Orthopedic Surgery discretion.  Murray catheter: Remove POD#1.   Physical Therapy/Occupational Therapy: Eval and treat.  Consults: Hospitalist.  Follow-up: Clinic with Dr. Waldrop in 6 weeks with repeat x-rays.   Disposition: Pending progress with therapies, pain control on orals, and medical stability.    Orthopaedics surgery staff for this patient is Dr. Waldrop.    Miguel Miguel MD  Orthopedic Surgery, PGY-1  Pager: [phone]  6:09 AM  June 27, 2020    FOLLOWUP:    Future Appointments   Date Time Provider Department Center   6/27/2020  9:30 AM Abdon Cleary, PT [facility] [place]   6/27/2020  1:00 PM Ronald Hamm, OT [facility] [place]   6/27/2020  2:30 PM Abdon Cleary, PT [facility] [place]   8/7/2020 10:20 AM Dmitri Waldrop MD [facility]   9/18/2020  1:50 PM Dmitri Waldrop MD [facility]

## 2020-06-27 NOTE — PLAN OF CARE
A/O x 4. Pain was an issue early in shift. Pt having such pain that hard for pt to even move or take a deep breath.  Oxycodone was not  helping pain at all. IVP dilaudid was working,but only lasting for a short time. Call placed to Dr. Del Valle to get pain med order changed, received order for PO dilaudid 2-4 mg every 3 hours. Valium also given for spasms. Pt is now able to reposition.Pt did report that pain was significantly better and  [place] that might be able to sleep. Tolerated corn flakes, milk and ice cream with no nausea or emesis. Lidocaine drip infusing with no s/s of lidocaine toxicity. VSS. Afebrile. Capnography WNL.  CMS and neuro's intact. Pt did have area of numbness on left arm where BP cuff had been for post op vitals, but that has improved. Will cont to assess.

## 2020-06-27 NOTE — PLAN OF CARE
"      VS:   /84   Pulse 81   Temp 98.5  F (36.9  C) (Oral)   Resp 12   Ht 1.727 m (5' 8\")   Wt 74.3 kg (163 lb 12.8 oz)   SpO2 99%   BMI 24.91 kg/m   on RA.    Output:   Good urine output from Murray ---clear yellow  + flatus ..   Last BM yesterday ( 6/25/20)   Activity:   Turned and repositioned with assist of one in bed.   Tele /Lidocaine drip Tele --normal sinus Rhythm . No ectopy. HR 80's  Lidocaine infusing at 9.29ml/hr.  no s/s of  lidocaine systemic toxicity   Pain:   Tolerably managed :  scheduled Lyrica and Tylenol given. Pt refused scheduled IV toradol , saying it causes heartburn and didn't work well previously .  Oxycodone 10mg q 3hr and Valium 5mg given q 6hrs given.  Ice pack applied to back.  Lidocaine patches placed to back ( Pharmacist says it's ok to give Lidocaine drip and patches together), IV dilaudid 0.5mg x2 given.    Neuro/CMS:   C/o left thumb to upper arm ( dermatome of C6 level) --pt started awareness of numbness around 7pm, BP cuff was on, so removed BP cuff and monitored---- pt reports numbness is getting better without BP cuff.    No N/T on BLEs, and strength is good as 4/5 in BLE's     Dressing(s):   Dressing to back CDI.    Diet:   Denies N/V or reflux.  Hx of PONV, scopolamine patch in place.  Omeprazole given.   Tolerates regular diet.     Drain:   Hemovac output 20ml   Equipment:   Canpno, PCD's on     Plan:   Continue to monitor VS, CMS/ Neuros  and pain.  Encourage IS use while awake.    Additional Info:   Pt arrived on unit from PACU by bed at 17:15. Alert and oriented x4.  Skin check was done with PACU nurse Hanny on arrival        "

## 2020-06-27 NOTE — PLAN OF CARE
Discharge Planner OT   [facility] plan for discharge: [facility]  Current status: Pt educated on role of OT, POC, and spine precautions. Significant pain reported this session, however agreeable to sit at EOB. Sit>supine SBA with extra time. Education for LB dressing provided- donned/doffed socks with AE. Sit>supine CGA to BLEs. Unable to tolerate further activity d/t pain. Also mentioned he would like a shower chair.  Barriers to return to prior living situation: pain, decreased activity tolerance, post op precautions, medical  Recommendations for discharge: Anticipate home with home health pending progress with therapy.  Rationale for recommendations: Continue daily OT to maximize safety and IND w/ ADLs and functional mobility.       Entered by: Ronald Hamm 06/27/2020 2:02 PM

## 2020-06-27 NOTE — PROGRESS NOTES
"   06/27/20 1306   Quick Adds   Type of Visit Initial Occupational Therapy Evaluation   Living Environment   Lives With friend(s)   Living Arrangements house   Home Accessibility stairs to enter home  (does not have to go upstairs)   Number of Stairs, Main Entrance 3   Stair Railings, Main Entrance none   Transportation Anticipated family or friend will provide   Living Environment Comment Walk-in shower, standard height toilet   Self-Care   Usual Activity Tolerance good   Regular Exercise No  (not since October; used to swim daily)   Equipment Currently Used at Home cane, straight   Activity/Exercise/Self-Care Comment IND w/ self-cares, however required significant extra time to complete. Plans to have [facility].   Functional Level   Ambulation 1-->assistive equipment   Transferring 0-->independent   Toileting 0-->independent   Bathing 0-->independent   Dressing 0-->independent   Eating 0-->independent   Communication 0-->understands/communicates without difficulty   Swallowing 0-->swallows foods/liquids without difficulty   Cognition 0 - no cognition issues reported   Fall history within last six months yes   Number of times patient has fallen within last six months 1   Which of the above functional risks had a recent onset or change? ambulation;transferring;fall history   General Information   Onset of Illness/Injury or Date of Surgery - Date 06/26/20   Referring Physician Dr. Waldrop   Patient/Family Goals Statement \"I want to golf again, swimming, do photography.\"   Additional Occupational Profile Info/Pertinent History of Current Problem 40 year old male with PMH including GERD, PTSD, Anxiety, panic disorder and insomnia now s/p L5-S1 on 6/26. See chart for PMH.   Precautions/Limitations spinal precautions   Weight-Bearing Status - LUE other (see comments)  (no lifting >10 lbs)   Weight-Bearing Status - RUE other (see comments)  (no lifting >10 lbs)   Weight-Bearing Status - LLE " weight-bearing as tolerated   Weight-Bearing Status - RLE weight-bearing as tolerated   General Observations IV, pulse ox, CHIQUIS drain, echevarria catheter, telemetry   Cognitive Status Examination   Orientation orientation to person, place and time   Visual Perception   Visual Perception Wears glasses  (contacts)   Sensory Examination   Sensory Comments L arm sporadic numbness   Pain Assessment   Patient Currently in Pain Yes, see Vital Sign flowsheet   Range of Motion (ROM)   ROM Comment LUE WFL, R elbow arthritis- limited ROM   Strength   Strength Comments LUE WFL, R elbow limited   Muscle Tone Assessment   Muscle Tone Quick Adds No deficits were identified   Coordination   Upper Extremity Coordination No deficits were identified   Mobility   Bed Mobility Bed mobility skill: Sit to supine;Bed mobility skill: Supine to sit   Bed Mobility Skill: Sit to Supine   Level of [place]: Sit/Supine contact guard   Bed Mobility Skill: Supine to Sit   Level of [place]: Supine/Sit stand-by assist   Transfer Skills   Transfer Comments No OOB observed   Toilet Transfer   Toilet Transfer Comments Not assessed   Tub/Shower Transfer   Tub/Shower Transfer Comments Not assessed   Bathing   Level of [place] moderate assist (50% patients effort)   Upper Body Dressing   Level of [place]: Dress Upper Body minimum assist (75% patients effort)   Lower Body Dressing   Level of [place]: Dress Lower Body minimum assist (75% patients effort)   Toileting   Level of [place]: Toilet minimum assist (75% patients effort)   Grooming   Level of [place]: Grooming stand-by assist   Eating/Self Feeding   Level of [place]: Eating stand-by assist   Activities of Daily Living Analysis   Impairments Contributing to Impaired Activities of Daily Living pain;post surgical precautions   General Therapy Interventions   Planned Therapy Interventions ADL retraining   Clinical Impression   Criteria for Skilled Therapeutic  "Interventions Met yes, treatment indicated   OT Diagnosis Impaired ADLs and functional mobility   Influenced by the following impairments pain, post op precautions   Assessment of Occupational Performance 1-3 Performance Deficits   Identified Performance Deficits dressing, toileting, bathing   Clinical Decision Making (Complexity) Low complexity   Therapy Frequency Daily   Predicted Duration of Therapy Intervention (days/wks) 4 days   Anticipated Equipment Needs at Discharge shower chair;raised toilet seat;reacher;sock aide   Anticipated Discharge Disposition Home with Assist;Home with Home Therapy   Risks and Benefits of Treatment have been explained. Yes   Patient, Family & other staff in agreement with plan of care Yes   [facility] TM \"6 Clicks\"   2016, Trustees of [facility], under license to [employer].  All rights reserved.   6 Clicks Short Forms Daily Activity Inpatient Short Form   [facility]  \"6 Clicks\" Daily Activity Inpatient Short Form   1. Putting on and taking off regular lower body clothing? 3 - A Little   2. Bathing (including washing, rinsing, drying)? 2 - A Lot   3. Toileting, which includes using toilet, bedpan or urinal? 3 - A Little   4. Putting on and taking off regular upper body clothing? 3 - A Little   5. Taking care of personal grooming such as brushing teeth? 4 - None   6. Eating meals? 4 - None   Daily Activity Raw Score (Score out of 24.Lower scores equate to lower levels of function) 19   Total Evaluation Time   Total Evaluation Time (Minutes) 14     "

## 2020-06-27 NOTE — PLAN OF CARE
Patient plan for discharge: Home with assist  Current status: Eval completed, treatment initiated. Some extra time necessary d/t line management and d/t pt pain levels. Educated on log roll and spinal precautions with good recall. Supine-sit with modA for LEs. Sat x 3 min with PT monitoring symptoms. Sit-stand with Rita and walker. Very painful throughout but insisted on completing. Noted knees bent throughout until fully standing. Stood x 2 min with UE support, then sat with CGA. Sit-supine with modA for LEs, then repositions self with rails and significant effort. Completes supine exercises for strength and ROM.  Barriers to return to prior living situation: Pain, level of assist, medical, Significantly impaired functional mobility  Recommendations for discharge: Home with assist  Rationale for recommendations: Anticipate pt will improve and progress with therapies to be appropriate for home with assist.       Entered by: Abdon Cleary 06/27/2020 12:42 PM

## 2020-06-27 NOTE — PLAN OF CARE
Patient is alert and oriented.  Is tolerating regular diet.  Is passing flatus.  Refused to have catheter removed, even after much encouragement.  Dressing is CDI.  Hemovac is patent.  Numbness on left forearm remains.  Remains on oral Dilaudid, IV Dilaudid, and Lidocaine drip.  Patient continues to have quite a bit of pain, but feels his pain is getting more managed.  Said he was working hard in therapy today and that is why he is having quite a bit of pain.  Needs minimal assist to reposition himself in bed.  Call light is within reach.  Is able to make needs known.  Will continue with plan of care.

## 2020-06-28 ENCOUNTER — APPOINTMENT (OUTPATIENT)
Dept: PHYSICAL THERAPY | Facility: CLINIC | Age: 40
DRG: 455 | End: 2020-06-28
Attending: ORTHOPAEDIC SURGERY
Payer: COMMERCIAL

## 2020-06-28 ENCOUNTER — APPOINTMENT (OUTPATIENT)
Dept: OCCUPATIONAL THERAPY | Facility: CLINIC | Age: 40
DRG: 455 | End: 2020-06-28
Attending: ORTHOPAEDIC SURGERY
Payer: COMMERCIAL

## 2020-06-28 LAB — HGB BLD-MCNC: 12.7 G/DL (ref 13.3–17.7)

## 2020-06-28 PROCEDURE — 36415 COLL VENOUS BLD VENIPUNCTURE: CPT | Performed by: STUDENT IN AN ORGANIZED HEALTH CARE EDUCATION/TRAINING PROGRAM

## 2020-06-28 PROCEDURE — 25000132 ZZH RX MED GY IP 250 OP 250 PS 637: Performed by: INTERNAL MEDICINE

## 2020-06-28 PROCEDURE — 97535 SELF CARE MNGMENT TRAINING: CPT | Mod: GO

## 2020-06-28 PROCEDURE — 25000128 H RX IP 250 OP 636: Performed by: ORTHOPAEDIC SURGERY

## 2020-06-28 PROCEDURE — 25000128 H RX IP 250 OP 636: Performed by: STUDENT IN AN ORGANIZED HEALTH CARE EDUCATION/TRAINING PROGRAM

## 2020-06-28 PROCEDURE — 25800030 ZZH RX IP 258 OP 636: Performed by: HOSPITALIST

## 2020-06-28 PROCEDURE — 85018 HEMOGLOBIN: CPT | Performed by: STUDENT IN AN ORGANIZED HEALTH CARE EDUCATION/TRAINING PROGRAM

## 2020-06-28 PROCEDURE — 25000132 ZZH RX MED GY IP 250 OP 250 PS 637: Performed by: STUDENT IN AN ORGANIZED HEALTH CARE EDUCATION/TRAINING PROGRAM

## 2020-06-28 PROCEDURE — 99232 SBSQ HOSP IP/OBS MODERATE 35: CPT | Performed by: INTERNAL MEDICINE

## 2020-06-28 PROCEDURE — 12000001 ZZH R&B MED SURG/OB UMMC

## 2020-06-28 PROCEDURE — 25000132 ZZH RX MED GY IP 250 OP 250 PS 637: Performed by: HOSPITALIST

## 2020-06-28 PROCEDURE — 97110 THERAPEUTIC EXERCISES: CPT | Mod: GP

## 2020-06-28 PROCEDURE — 97530 THERAPEUTIC ACTIVITIES: CPT | Mod: GP

## 2020-06-28 RX ADMIN — HYDROMORPHONE HYDROCHLORIDE 6 MG: 2 TABLET ORAL at 09:47

## 2020-06-28 RX ADMIN — HYDROMORPHONE HYDROCHLORIDE 0.5 MG: 1 INJECTION, SOLUTION INTRAMUSCULAR; INTRAVENOUS; SUBCUTANEOUS at 14:55

## 2020-06-28 RX ADMIN — HYDROMORPHONE HYDROCHLORIDE 6 MG: 2 TABLET ORAL at 15:53

## 2020-06-28 RX ADMIN — SODIUM CHLORIDE, POTASSIUM CHLORIDE, SODIUM LACTATE AND CALCIUM CHLORIDE: 600; 310; 30; 20 INJECTION, SOLUTION INTRAVENOUS at 00:30

## 2020-06-28 RX ADMIN — DIAZEPAM 5 MG: 5 TABLET ORAL at 22:10

## 2020-06-28 RX ADMIN — OMEPRAZOLE 20 MG: 20 CAPSULE, DELAYED RELEASE ORAL at 15:53

## 2020-06-28 RX ADMIN — HYDROMORPHONE HYDROCHLORIDE 1 MG: 1 INJECTION, SOLUTION INTRAMUSCULAR; INTRAVENOUS; SUBCUTANEOUS at 05:45

## 2020-06-28 RX ADMIN — HYDROMORPHONE HYDROCHLORIDE 6 MG: 2 TABLET ORAL at 06:34

## 2020-06-28 RX ADMIN — HYDROMORPHONE HYDROCHLORIDE 1 MG: 1 INJECTION, SOLUTION INTRAMUSCULAR; INTRAVENOUS; SUBCUTANEOUS at 23:41

## 2020-06-28 RX ADMIN — DIAZEPAM 5 MG: 5 TABLET ORAL at 14:55

## 2020-06-28 RX ADMIN — ACETAMINOPHEN 975 MG: 325 TABLET, FILM COATED ORAL at 07:53

## 2020-06-28 RX ADMIN — Medication 1 MG/KG/HR: at 00:31

## 2020-06-28 RX ADMIN — DOCUSATE SODIUM 50MG AND SENNOSIDES 8.6MG 2 TABLET: 8.6; 5 TABLET, FILM COATED ORAL at 20:38

## 2020-06-28 RX ADMIN — HYDROMORPHONE HYDROCHLORIDE 0.5 MG: 1 INJECTION, SOLUTION INTRAMUSCULAR; INTRAVENOUS; SUBCUTANEOUS at 13:54

## 2020-06-28 RX ADMIN — HYDROMORPHONE HYDROCHLORIDE 6 MG: 2 TABLET ORAL at 12:54

## 2020-06-28 RX ADMIN — PREGABALIN 200 MG: 100 CAPSULE ORAL at 07:53

## 2020-06-28 RX ADMIN — ACETAMINOPHEN 975 MG: 325 TABLET, FILM COATED ORAL at 00:37

## 2020-06-28 RX ADMIN — HYDROMORPHONE HYDROCHLORIDE 1 MG: 1 INJECTION, SOLUTION INTRAMUSCULAR; INTRAVENOUS; SUBCUTANEOUS at 17:25

## 2020-06-28 RX ADMIN — DOCUSATE SODIUM 50MG AND SENNOSIDES 8.6MG 2 TABLET: 8.6; 5 TABLET, FILM COATED ORAL at 07:53

## 2020-06-28 RX ADMIN — HYDROMORPHONE HYDROCHLORIDE 1 MG: 1 INJECTION, SOLUTION INTRAMUSCULAR; INTRAVENOUS; SUBCUTANEOUS at 00:37

## 2020-06-28 RX ADMIN — HYDROMORPHONE HYDROCHLORIDE 6 MG: 2 TABLET ORAL at 18:55

## 2020-06-28 RX ADMIN — HYDROMORPHONE HYDROCHLORIDE 1 MG: 1 INJECTION, SOLUTION INTRAMUSCULAR; INTRAVENOUS; SUBCUTANEOUS at 08:01

## 2020-06-28 RX ADMIN — PREGABALIN 200 MG: 100 CAPSULE ORAL at 15:53

## 2020-06-28 RX ADMIN — HYDROMORPHONE HYDROCHLORIDE 1 MG: 1 INJECTION, SOLUTION INTRAMUSCULAR; INTRAVENOUS; SUBCUTANEOUS at 20:39

## 2020-06-28 RX ADMIN — PREGABALIN 200 MG: 100 CAPSULE ORAL at 20:38

## 2020-06-28 RX ADMIN — ACETAMINOPHEN 975 MG: 325 TABLET, FILM COATED ORAL at 17:25

## 2020-06-28 RX ADMIN — LIDOCAINE 2 PATCH: 560 PATCH PERCUTANEOUS; TOPICAL; TRANSDERMAL at 20:38

## 2020-06-28 RX ADMIN — OMEPRAZOLE 20 MG: 20 CAPSULE, DELAYED RELEASE ORAL at 07:53

## 2020-06-28 RX ADMIN — HYDROMORPHONE HYDROCHLORIDE 6 MG: 2 TABLET ORAL at 22:10

## 2020-06-28 RX ADMIN — QUETIAPINE FUMARATE 50 MG: 50 TABLET ORAL at 23:41

## 2020-06-28 RX ADMIN — NICOTINE 1 PATCH: 21 PATCH, EXTENDED RELEASE TRANSDERMAL at 07:54

## 2020-06-28 RX ADMIN — DIAZEPAM 5 MG: 5 TABLET ORAL at 08:01

## 2020-06-28 ASSESSMENT — PAIN DESCRIPTION - DESCRIPTORS
DESCRIPTORS: ACHING;BURNING
DESCRIPTORS: ACHING;BURNING

## 2020-06-28 NOTE — PLAN OF CARE
VS:   Stable; lungs CTA.   Output:   Producing good amount of clear yellow urine. Passing gas although no BM.   Activity:   Able to roll/turn in bed nearly independently. Was not out of bed tonight.   Skin: WDL with exception of incision and drain site   Pain:   Moderately controlled with hydromorphone 6 mg PO every 3 hours and 1 mg IVP every 2-4 hours. Valium has also been helpful. Lidocaine drip discontinued around 0600 this morning.   Neuro/CMS:   CMS intact. Patient complains of slight and improving numbness on the anterior medial side of left arm.   Dressing(s):   CDI   Diet:   Regular   LDA:   Murray removed at 0615 this morning. Hemovac had 10 mL of bright red output overnight and 10 mL out during the previous evening shift.   Equipment:   Telemetry showed normal sinus rhythm overnight and was discontinued this morning.   Plan:   Continue to work towards adequate pain control.   Additional Info:

## 2020-06-28 NOTE — PROGRESS NOTES
[facility]    Medicine Progress Note - Hospitalist Service       Date of Admission:  6/26/2020  Assessment & Plan    Ortega Burroughs is a 40 year old male  with Hx of GERD, PTSD, Anxiety, Panic disorder, insomnia, tobacco use disorder. He had micro discectomy by Dr Waldrop in March 2020. For recurrence of symptoms he underwent Lumbar 5 to Sacral 1 instrumented posterior interbody fusion and mchugh peters osteotomy by Dr Waldrop on 06/26/20.     #  S/P Lumbar 5 to Sacral 1 instrumented posterior interbody fusion and mchugh peters osteotomy , POD #2  Management per primary team. Drain to come out today. Post op Hgb at 12.7 on 6/28  Lidocaine drip has since been stopped   Continues to need high dose  Oral dilaudid 4-6 mg Q 3 hrs in addition to PRN IV dilaudid   Continue Lyrica for chronic pain  DVT prophylaxis with PCD's   Encourage ambulation with therapy  Murray catheter out      # PONV:   Scopolamine patch. For breakthrough nausea use PRN Zofran or compazine.   Currently no nausea/ vomiting     # GERD:   Continue PTA omeprazole    # PTSD, Anxiety, Panic disorder, insomnia:   Continue PTA Valium prn and seroquil at bed time     # Tobacco use disorder.   Nicotine patches prn. Smokes less than 0.5 PPD.           Diet: Advance Diet as Tolerated: Regular Diet Adult  Diet    DVT Prophylaxis: Pneumatic Compression Devices  Murray Catheter: not present  Code Status: Full Code           Disposition Plan   Expected discharge: To be determined by primary team   Entered: Roberto Ram MD 06/28/2020, 8:57 AM       The patient's care was discussed with the Bedside Nurse and Patient.    Roberto Ram MD  Hospitalist Service  [facility]    ______________________________________________________________________    Interval History   Patient continues to experience pain psot surgery. He has been on chronic pain medications in the  past   Denies fevers or chills   Passing gas  Denies chest pain or SOB    Data reviewed today: I reviewed all medications, new labs and imaging results over the last 24 hours. I personally reviewed no images or EKG's today.    Physical Exam   Vital Signs: Temp: 98.6  F (37  C) Temp src: Oral BP: 114/73 Pulse: 92 Heart Rate: 79 Resp: 15 SpO2: 95 % O2 Device: None (Room air)    Weight: 163 lbs 12.83 oz  General Appearance: Awake, alert and in moderate distress due to pain   Respiratory: Clear breath sounds bilaterally   Cardiovascular: Normal Heart sounds. No murmurs   GI: Soft, non tender. Normal bowel sounds   Skin: No bruising or bleeding   Other: Distal neurovasculars is intact. Alert and orientated X 3     Data   Recent Labs   Lab 06/28/20  0538 06/27/20  0607 06/24/20  0920   WBC  --  9.1 7.2   HGB 12.7* 12.4* 15.9   MCV  --  94 94   PLT  --  143* 199   NA  --  141 138   POTASSIUM  --  4.2 4.3   CHLORIDE  --  110* 109   CO2  --  28 25   BUN  --  19 17   CR  --  1.25 1.08   ANIONGAP  --  3 4   MIHIR  --  8.2* 8.7   GLC  --  104* 81   ALBUMIN  --   --  3.8   PROTTOTAL  --   --  7.5   BILITOTAL  --   --  0.3   ALKPHOS  --   --  84   ALT  --   --  21   AST  --   --  15

## 2020-06-28 NOTE — PLAN OF CARE
Discharge Planner OT   Patient plan for discharge: home with home care  Current status: Patient continues to have pain control issues, is not able to bear weight through R LE, causes severe pain in R buttock. Patient was able to transfer to chair with CGA using FWW. Patient sat in chair for ~10 mins to complete light self care tasks. Min A for sit>supine using log roll technique. Discussed DME needs and patient would like: walker, shower chair, RTS w/handles, reacher, and sock aide.   Barriers to return to prior living situation: pain, post op precautions  Recommendations for discharge: anticipate home with home care once pain control improves and able to tolerate more activity  Rationale for recommendations: will continue with OT to maximize safety and IND with functional mobility and ADLs       Entered by: Ute Pires 06/28/2020 2:06 PM

## 2020-06-28 NOTE — PLAN OF CARE
Discharge Planner PT   Patient plan for discharge: [facility]  Current status: Log roll with Rita for LEs and trunk for supine-sit, sit-stand x2 with CGA and walker, very effortful, ambulated 10 ft in room with walker, complained of R buttock pain, noted some leg buckling with Rita for safety, sit-supine with log roll and Rita for LEs, repositions self with rails. Recalls all spinal precautions. High pain throughout, nursing aware.  Pain control also an issue in PM session. Pt with significant difficulty bearing weight through RLE. Nursing and ortho aware.  Barriers to return to prior living situation: Pain, Significantly impaired functional mobility, impaired activity tolerance, post op precautions, medical  Recommendations for discharge: Home with assist and home health  Rationale for recommendations: Anticipate pt will progress with therapies to be safe for discharge home with assist, home health to maximize safety and independence with mobility.       Entered by: Abdon Cleary 06/28/2020 9:13 AM

## 2020-06-28 NOTE — PLAN OF CARE
"Patient is alert and oriented.  Has been working with therapies today.  When states when he puts pressure on his right leg he feels quite a bit of pain and sometimes hears a \"pop\".  Ortho resident on-call was notified of this during a phone conversation and stated his pain is most likely related to his underlying radiculopathy.  Patient was encouraged to get his standing xrays done today, but said he is not able to stand for 5 minutes, will try tomorrow.  Has been using oral Dilaudid whenever it is available.  Also using IV Dilaudid frequently.  Is tolerating regular diet.  Dressing is CDI.  Voided a large amount this afternoon and PVR was minimal.  Is using ice to his back for additional pain relief.  Call light is within reach.  Is able to make needs known.  Will continue with plan of care.  "

## 2020-06-28 NOTE — PROGRESS NOTES
"Orthopaedic Surgery Progress Note:  06/28/2020     Subjective:   NAEO. States his pain is about the same as yesterday, although he slept better than prior. Worked with PT/OT yesterday and stood by bedside. Tolerating general diet without nausea or vomiting. Passing flatus, no BM. Murray still in place- patient refused removal. Denies CP/SOB/fevers/chills. Continues to state that RLE paraesthesias have improved postoperatively.    Objective:   /85 (BP Location: Left arm)   Pulse 85   Temp 98.6  F (37  C) (Oral)   Resp 17   Ht 1.727 m (5' 8\")   Wt 74.3 kg (163 lb 12.8 oz)   SpO2 97%   BMI 24.91 kg/m    I/O this shift:  In: -   Out: 1295 [Urine:1275; Drains:20]  Gen: NAD. Resting comfortably in bed  Resp: Breathing comfortably on 2L NC  Drain:   Drain output of 20/10 at 24/7 hours, respectively.  Musculoskeletal: dressing c/d/I.    Lower extremities:  Motor Strength Right Left   Hip flexion: L1, L2, L3 4/5 5/5   Hip adduction: L2, L3 4/5 5/5   Knee flexion: S1 4/5 5/5   Knee extension: L3, L4 4/5 5/5   Ankle dosiflexion: L4, L5 4+/5 5/5   EHL: L5 4+/5 5/5   Ankle plantarflexion: S1 4+/5 5/5     Sensation from L1-S2 is preserved.    Labs:  Lab Results   Component Value Date    WBC 9.1 06/27/2020    HGB 12.7 (L) 06/28/2020     (L) 06/27/2020        Assessment & Plan:   Ortega Burroughs is a 40 year old male with PMH including GERD, PTSD, Anxiety, panic disorder and insomnia now s/p L5-S1 on 6/26 with Dr. Waldrop.      Goals for today:  - Murray out  - Up and out of bed, work with PT/OT  - Pain control     Ortho Primary  Activity: Up with assist until independent. No excessive bending or twisting. No lifting >10 lbs x 6 weeks. No Makayla lift for transfers.   Weight bearing status: WBAT.  Pain management: Transition from IV to PO as tolerated. No NSAIDs Lidocaine gtt x24 hours  Antibiotics: Ancef x24 hours  Diet: Begin with clear fluids and progress diet as tolerated.   DVT prophylaxis: SCDs only. No " chemical DVT ppx needed.  Imaging: XR Lumbar spine PTDC - ordered.  Labs: labs PRN  Bracing/Splinting: None  Dressings: Keep dressing c/d/i x7 days.  Drains: Document output per shift, will be discontinued at Orthopedic Surgery discretion.  Murray catheter: Removed POD#2.  Physical Therapy/Occupational Therapy: Eval and treat.  Consults: Hospitalist.  Follow-up: Clinic with Dr. Waldrop in 6 weeks with repeat x-rays.   Disposition: Pending progress with therapies, pain control on orals, and medical stability.     Orthopaedics surgery staff for this patient is Dr. Waldrop.    Miguel Miguel MD  Orthopedic Surgery, PGY-1  Pager: [phone]  6:31 AM  June 28, 2020    FOLLOWUP:    Future Appointments   Date Time Provider Department Center   6/28/2020  8:30 AM Abdon Cleary, PT [facility] [place]   6/28/2020 11:00 AM Ute Pires OT [facility] [place]   6/28/2020  2:00 PM Abdon Cleary, PT [facility] [place]   8/7/2020 10:20 AM Dmitri Waldrop MD [facility]   9/18/2020  1:50 PM Dmitri Waldrop MD [facility]

## 2020-06-29 ENCOUNTER — APPOINTMENT (OUTPATIENT)
Dept: PHYSICAL THERAPY | Facility: CLINIC | Age: 40
DRG: 455 | End: 2020-06-29
Attending: ORTHOPAEDIC SURGERY
Payer: COMMERCIAL

## 2020-06-29 ENCOUNTER — APPOINTMENT (OUTPATIENT)
Dept: OCCUPATIONAL THERAPY | Facility: CLINIC | Age: 40
DRG: 455 | End: 2020-06-29
Attending: ORTHOPAEDIC SURGERY
Payer: COMMERCIAL

## 2020-06-29 ENCOUNTER — APPOINTMENT (OUTPATIENT)
Dept: GENERAL RADIOLOGY | Facility: CLINIC | Age: 40
DRG: 455 | End: 2020-06-29
Attending: STUDENT IN AN ORGANIZED HEALTH CARE EDUCATION/TRAINING PROGRAM
Payer: COMMERCIAL

## 2020-06-29 PROCEDURE — 97535 SELF CARE MNGMENT TRAINING: CPT | Mod: GO

## 2020-06-29 PROCEDURE — 25000132 ZZH RX MED GY IP 250 OP 250 PS 637: Performed by: STUDENT IN AN ORGANIZED HEALTH CARE EDUCATION/TRAINING PROGRAM

## 2020-06-29 PROCEDURE — 97530 THERAPEUTIC ACTIVITIES: CPT | Mod: GP

## 2020-06-29 PROCEDURE — 25000128 H RX IP 250 OP 636

## 2020-06-29 PROCEDURE — 97116 GAIT TRAINING THERAPY: CPT | Mod: GP

## 2020-06-29 PROCEDURE — 25000128 H RX IP 250 OP 636: Performed by: STUDENT IN AN ORGANIZED HEALTH CARE EDUCATION/TRAINING PROGRAM

## 2020-06-29 PROCEDURE — 99232 SBSQ HOSP IP/OBS MODERATE 35: CPT | Performed by: INTERNAL MEDICINE

## 2020-06-29 PROCEDURE — 72100 X-RAY EXAM L-S SPINE 2/3 VWS: CPT

## 2020-06-29 PROCEDURE — 25000132 ZZH RX MED GY IP 250 OP 250 PS 637: Performed by: HOSPITALIST

## 2020-06-29 PROCEDURE — 12000001 ZZH R&B MED SURG/OB UMMC

## 2020-06-29 PROCEDURE — 25000132 ZZH RX MED GY IP 250 OP 250 PS 637: Performed by: INTERNAL MEDICINE

## 2020-06-29 PROCEDURE — 25000125 ZZHC RX 250: Performed by: HOSPITALIST

## 2020-06-29 RX ORDER — HYDROMORPHONE HYDROCHLORIDE 2 MG/1
6-8 TABLET ORAL
Status: DISCONTINUED | OUTPATIENT
Start: 2020-06-29 | End: 2020-06-30 | Stop reason: HOSPADM

## 2020-06-29 RX ORDER — ACETAMINOPHEN 325 MG/1
650 TABLET ORAL EVERY 4 HOURS PRN
Qty: 1 BOTTLE | Refills: 0 | Status: SHIPPED | OUTPATIENT
Start: 2020-06-29 | End: 2020-07-06

## 2020-06-29 RX ORDER — AMOXICILLIN 250 MG
1 CAPSULE ORAL 2 TIMES DAILY
Qty: 30 TABLET | Refills: 0 | Status: SHIPPED | OUTPATIENT
Start: 2020-06-29 | End: 2020-07-06

## 2020-06-29 RX ORDER — HYDROMORPHONE HCL/0.9% NACL/PF 0.2MG/0.2
.2-.4 SYRINGE (ML) INTRAVENOUS
Status: DISCONTINUED | OUTPATIENT
Start: 2020-06-29 | End: 2020-06-30

## 2020-06-29 RX ORDER — HYDROMORPHONE HCL/0.9% NACL/PF 0.2MG/0.2
.2-.4 SYRINGE (ML) INTRAVENOUS
Status: DISCONTINUED | OUTPATIENT
Start: 2020-06-29 | End: 2020-06-29

## 2020-06-29 RX ORDER — POLYETHYLENE GLYCOL 3350 17 G/17G
17 POWDER, FOR SOLUTION ORAL DAILY PRN
Qty: 7 PACKET | Refills: 0 | Status: SHIPPED | OUTPATIENT
Start: 2020-06-29

## 2020-06-29 RX ORDER — HYDROMORPHONE HYDROCHLORIDE 4 MG/1
4-6 TABLET ORAL
Qty: 30 TABLET | Refills: 0 | Status: SHIPPED | OUTPATIENT
Start: 2020-06-29 | End: 2020-06-30

## 2020-06-29 RX ORDER — DIAZEPAM 5 MG
5-10 TABLET ORAL EVERY 6 HOURS PRN
Status: DISCONTINUED | OUTPATIENT
Start: 2020-06-29 | End: 2020-06-30 | Stop reason: HOSPADM

## 2020-06-29 RX ADMIN — HYDROMORPHONE HYDROCHLORIDE 6 MG: 2 TABLET ORAL at 01:24

## 2020-06-29 RX ADMIN — DOCUSATE SODIUM 50MG AND SENNOSIDES 8.6MG 2 TABLET: 8.6; 5 TABLET, FILM COATED ORAL at 19:47

## 2020-06-29 RX ADMIN — HYDROMORPHONE HYDROCHLORIDE 6 MG: 2 TABLET ORAL at 10:34

## 2020-06-29 RX ADMIN — HYDROMORPHONE HYDROCHLORIDE 6 MG: 2 TABLET ORAL at 16:24

## 2020-06-29 RX ADMIN — SCOPALAMINE 1 PATCH: 1 PATCH, EXTENDED RELEASE TRANSDERMAL at 19:04

## 2020-06-29 RX ADMIN — ACETAMINOPHEN 975 MG: 325 TABLET, FILM COATED ORAL at 08:28

## 2020-06-29 RX ADMIN — LIDOCAINE 2 PATCH: 560 PATCH PERCUTANEOUS; TOPICAL; TRANSDERMAL at 19:00

## 2020-06-29 RX ADMIN — HYDROMORPHONE HYDROCHLORIDE 1 MG: 1 INJECTION, SOLUTION INTRAMUSCULAR; INTRAVENOUS; SUBCUTANEOUS at 04:02

## 2020-06-29 RX ADMIN — HYDROMORPHONE HYDROCHLORIDE 1 MG: 1 INJECTION, SOLUTION INTRAMUSCULAR; INTRAVENOUS; SUBCUTANEOUS at 07:08

## 2020-06-29 RX ADMIN — HYDROMORPHONE HYDROCHLORIDE 6 MG: 2 TABLET ORAL at 05:04

## 2020-06-29 RX ADMIN — HYDROMORPHONE HYDROCHLORIDE 6 MG: 2 TABLET ORAL at 19:46

## 2020-06-29 RX ADMIN — DIAZEPAM 10 MG: 5 TABLET ORAL at 16:23

## 2020-06-29 RX ADMIN — HYDROMORPHONE HYDROCHLORIDE 8 MG: 2 TABLET ORAL at 22:37

## 2020-06-29 RX ADMIN — PREGABALIN 200 MG: 100 CAPSULE ORAL at 13:27

## 2020-06-29 RX ADMIN — ACETAMINOPHEN 975 MG: 325 TABLET, FILM COATED ORAL at 01:24

## 2020-06-29 RX ADMIN — DOCUSATE SODIUM 50MG AND SENNOSIDES 8.6MG 2 TABLET: 8.6; 5 TABLET, FILM COATED ORAL at 08:28

## 2020-06-29 RX ADMIN — HYDROMORPHONE HYDROCHLORIDE 6 MG: 2 TABLET ORAL at 13:28

## 2020-06-29 RX ADMIN — OMEPRAZOLE 20 MG: 20 CAPSULE, DELAYED RELEASE ORAL at 16:24

## 2020-06-29 RX ADMIN — NICOTINE 1 PATCH: 21 PATCH, EXTENDED RELEASE TRANSDERMAL at 08:32

## 2020-06-29 RX ADMIN — QUETIAPINE FUMARATE 50 MG: 50 TABLET ORAL at 22:37

## 2020-06-29 RX ADMIN — PREGABALIN 200 MG: 100 CAPSULE ORAL at 08:28

## 2020-06-29 RX ADMIN — Medication 0.2 MG: at 09:31

## 2020-06-29 RX ADMIN — OMEPRAZOLE 20 MG: 20 CAPSULE, DELAYED RELEASE ORAL at 08:28

## 2020-06-29 RX ADMIN — DIAZEPAM 5 MG: 5 TABLET ORAL at 22:37

## 2020-06-29 RX ADMIN — DIAZEPAM 5 MG: 5 TABLET ORAL at 10:34

## 2020-06-29 RX ADMIN — PREGABALIN 200 MG: 100 CAPSULE ORAL at 19:47

## 2020-06-29 RX ADMIN — Medication 0.4 MG: at 18:57

## 2020-06-29 RX ADMIN — HYDROMORPHONE HYDROCHLORIDE 6 MG: 2 TABLET ORAL at 08:28

## 2020-06-29 RX ADMIN — DIAZEPAM 5 MG: 5 TABLET ORAL at 05:03

## 2020-06-29 NOTE — PLAN OF CARE
Discharge Planner PT   Patient plan for discharge: home with  services  Current status: Pt remains mostly limited by pain vs weakness or balance impairments. SBA supine<>sit, SBA sit<>stand to FWW, ambulates with FWW x50 CGA/SBA very slow with freezing at time due to pain. Unable to complete stairs today due to pain. Discussed discharge plan, pt able to have friends assist down x2 step to living area then all needs can be met until initiation of HH services, will need FWW at discharge.  Barriers to return to prior living situation: medical needs, pain control  Recommendations for discharge: anticipate home with  services  Rationale for recommendations: Pt currently below baseline level of function and would benefit from ongoing therapy to address the above deficits in order to progress towards PLOF and promote IND mobility.       Entered by: Dayan Alcantara 06/29/2020 4:20 PM

## 2020-06-29 NOTE — PROGRESS NOTES
"Orthopaedic Surgery Progress Note:  06/29/2020     Subjective:   NAEO. Pain waxes and wanes but improved from preop. Sleeping improved. Worked with PT/OT. Ambulating from bed to chair. Tolerating regular diet. Passing flatus, no BM. Denies CP/SOB/fevers/chills. Continues to state that RLE paraesthesias have improved postoperatively.    Objective:   /82   Pulse 83   Temp 98.3  F (36.8  C) (Oral)   Resp 15   Ht 1.727 m (5' 8\")   Wt 74.3 kg (163 lb 12.8 oz)   SpO2 98%   BMI 24.91 kg/m    No intake/output data recorded.  Gen: NAD. Resting comfortably in bed  Resp: Breathing comfortably on room air.  Musculoskeletal: Dressing c/d/I.    Lower extremities:  Motor Strength Right Left   Hip flexion: L1, L2, L3 4/5 5/5   Hip adduction: L2, L3 4/5 5/5   Knee flexion: S1 4/5 5/5   Knee extension: L3, L4 4/5 5/5   Ankle dosiflexion: L4, L5 4+/5 5/5   EHL: L5 4+/5 5/5   Ankle plantarflexion: S1 4+/5 5/5     Sensation from L1-S2 is preserved.    Labs:  Lab Results   Component Value Date    WBC 9.1 06/27/2020    HGB 12.7 (L) 06/28/2020     (L) 06/27/2020        Assessment & Plan:   Ortega Burroughs is a 40 year old male with PMH including GERD, PTSD, Anxiety, panic disorder and insomnia now s/p L5-S1 on 6/26 with Dr. Waldrop.      Goals for today:  - Up and out of bed, work with PT/OT  - Pain control, wean pain meds  - Radiographs today     Ortho Primary  Activity: Up with assist until independent. No excessive bending or twisting. No lifting >10 lbs x 6 weeks. No Makayla lift for transfers.   Weight bearing status: WBAT.  Pain management: Transition from IV to PO as tolerated. No NSAIDs Lidocaine gtt x24 hours  Antibiotics: Ancef x24 hours  Diet: Begin with clear fluids and progress diet as tolerated.   DVT prophylaxis: SCDs only. No chemical DVT ppx needed.  Imaging: XR Lumbar spine PTDC - ordered.  Labs: labs PRN  Bracing/Splinting: None  Dressings: Keep dressing c/d/i x7 days.  Drains: Document output per " shift, will be discontinued at Orthopedic Surgery discretion.  Murray catheter: Removed POD#2.  Physical Therapy/Occupational Therapy: Eval and treat.  Consults: Hospitalist.  Follow-up: Clinic with Dr. Waldrop in 6 weeks with repeat x-rays.   Disposition: Pending progress with therapies, pain control on orals, and medical stability.     Orthopaedics surgery staff for this patient is Dr. Waldrop.    Aditya Gallagher MD  Orthopedic Surgery, PGY-1  Pager: [phone]    FOLLOWUP:    Future Appointments   Date Time Provider Department Center   6/28/2020  8:30 AM Abdon Cleary, PT [facility] [place]   6/28/2020 11:00 AM Ute Pires OT [facility] [place]   6/28/2020  2:00 PM Abdon Cleary, PT [facility] [place]   8/7/2020 10:20 AM Dmitri Waldrop MD [facility]   9/18/2020  1:50 PM Dmitri Waldrop MD [facility]

## 2020-06-29 NOTE — PLAN OF CARE
Discharge Planner OT   Patient plan for discharge: home with home care, possibly tomorrow  Current status: Patient continues to have pain issues, but able to make some progress today. Needs cues for log roll with bed mobility. Patient completes sit<>stands with CGA-SBA using FWW, R LE occasionally cj due to nerve pain and he has trouble coming to full stand, increased time needed. Patient was able to ambulate to/from bathroom using FWW with CGA. Completed toilet transfer using commode in bathroom with CGA to stand.   Barriers to return to prior living situation: pain, post op precautions  Recommendations for discharge: home with home care   Rationale for recommendations: will continue with OT to maximize safety and IND with functional mobility and ADLs       Entered by: Ute Pires 06/29/2020 11:07 AM

## 2020-06-29 NOTE — PROGRESS NOTES
[facility]    Medicine Progress Note - Hospitalist Service       Date of Admission:  6/26/2020  Assessment & Plan    Ortega Burroughs is a 40 year old male  with Hx of GERD, PTSD, Anxiety, Panic disorder, insomnia, tobacco use disorder. He had micro discectomy by Dr Waldrop in March 2020. For recurrence of symptoms he underwent Lumbar 5 to Sacral 1 instrumented posterior interbody fusion and mchugh peters osteotomy by Dr Waldrop on 06/26/20.     #  S/P Lumbar 5 to Sacral 1 instrumented posterior interbody fusion and mchugh peters osteotomy , POD #3  Management per primary team. Drain to come out today. Post op Hgb at 12.7 on 6/28  Lidocaine drip has since been stopped   Continues to need high dose  Oral dilaudid 4-6 mg Q 3 hrs in addition to PRN IV dilaudid ( IV dilaudid has been decreased to 0.2-0,4 mg  Q 2 hrs PRN)  Continue Lyrica for chronic pain  DVT prophylaxis with PCD's   Encourage ambulation with therapy      # PONV:   Scopolamine patch. For breakthrough nausea use PRN Zofran or compazine.   Currently no nausea/ vomiting     # GERD:   Continue PTA omeprazole    # PTSD, Anxiety, Panic disorder, insomnia:   Continue PTA Valium prn and seroquil at bed time     # Tobacco use disorder.   Nicotine patches prn. Smokes less than 0.5 PPD.           Diet: Advance Diet as Tolerated: Regular Diet Adult  Diet    DVT Prophylaxis: Pneumatic Compression Devices  Murray Catheter: not present  Code Status: Full Code           Disposition Plan   Expected discharge: To be determined by primary team   Entered: Roberto Ram MD 06/29/2020, 8:49 AM       The patient's care was discussed with the Bedside Nurse and Patient, care coordinator and prim,edel team     Roberto Ram MD  Hospitalist Service  [facility]    ______________________________________________________________________    Interval History   Patient feels slightly  better. Thinks that his pain is under much better control   No fever or chills   Passing gas. Eating and drinking well   Data reviewed today: I reviewed all medications, new labs and imaging results over the last 24 hours. I personally reviewed no images or EKG's today.    Physical Exam   Vital Signs: Temp: 98.3  F (36.8  C) Temp src: Oral BP: 108/76 Pulse: 93   Resp: 16 SpO2: 97 % O2 Device: None (Room air)    Weight: 163 lbs 12.83 oz  General Appearance: Awake, alert and in moderate distress due to pain   Respiratory: Clear breath sounds bilaterally   Cardiovascular: Normal Heart sounds. No murmurs   GI: Soft, non tender. Normal bowel sounds   Skin: No bruising or bleeding   Other: Distal neurovasculars is intact. Alert and orientated X 3     Data   Recent Labs   Lab 06/28/20  0538 06/27/20  0607 06/24/20  0920   WBC  --  9.1 7.2   HGB 12.7* 12.4* 15.9   MCV  --  94 94   PLT  --  143* 199   NA  --  141 138   POTASSIUM  --  4.2 4.3   CHLORIDE  --  110* 109   CO2  --  28 25   BUN  --  19 17   CR  --  1.25 1.08   ANIONGAP  --  3 4   MIHIR  --  8.2* 8.7   GLC  --  104* 81   ALBUMIN  --   --  3.8   PROTTOTAL  --   --  7.5   BILITOTAL  --   --  0.3   ALKPHOS  --   --  84   ALT  --   --  21   AST  --   --  15

## 2020-06-29 NOTE — PLAN OF CARE
"  VS:    /76   Pulse 93   Temp 98.3  F (36.8  C) (Oral)   Resp 16   Ht 1.727 m (5' 8\")   Wt 74.3 kg (163 lb 12.8 oz)   SpO2 97%   BMI 24.91 kg/m  VSS. No reports of SOB, CP, or dizziness. LS clear equal bilaterally, on RA   Output:    Voids spontaneously w/out difficulty. Last BM 6/29/20, passing gas    Activity:    Assist of 1 w/ walker & gait belt    Skin: Intact w/ the exception of incisions    Pain:    Moderate, tolerable w/ discomfort. Has available valium q6h, dilaudid q3h   Neuro/CMS:    Denies any numbness/tingling    Dressing(s):    Posterior back: CDI    Diet:    Regular    Equipment:    Walker    IV's/Drains:    PIV L forearm: saline locked   Plan:    Discharge tomorrow    Additional Info:    Nicotine patch R shoulder  IV medication discontinued today           "

## 2020-06-29 NOTE — PLAN OF CARE
VS:   Stable ; lungs CTA   Output:   BM this evening. Voiding in urinal   Activity:   Patient remained in bed all shift with exception of getting to bedside commode   Skin: WDL with exception of incision an drain site.   Pain:   Requesting pain medications as they're available. Encouraged patient to wean from IV Dilaudid.   Neuro/CMS:   Intact; patient denies numbness and/or tingling   Dressing(s):   CDI   Diet:   Regular   LDA:   PIV SL   Equipment:   PCDs in place. Commode at bedside.   Plan:   Continue POC. Patient able to make needs known.   Additional Info:

## 2020-06-30 ENCOUNTER — APPOINTMENT (OUTPATIENT)
Dept: PHYSICAL THERAPY | Facility: CLINIC | Age: 40
DRG: 455 | End: 2020-06-30
Attending: ORTHOPAEDIC SURGERY
Payer: COMMERCIAL

## 2020-06-30 ENCOUNTER — PATIENT OUTREACH (OUTPATIENT)
Dept: CARE COORDINATION | Facility: CLINIC | Age: 40
End: 2020-06-30

## 2020-06-30 ENCOUNTER — APPOINTMENT (OUTPATIENT)
Dept: OCCUPATIONAL THERAPY | Facility: CLINIC | Age: 40
DRG: 455 | End: 2020-06-30
Attending: ORTHOPAEDIC SURGERY
Payer: COMMERCIAL

## 2020-06-30 VITALS
HEIGHT: 68 IN | TEMPERATURE: 97.9 F | WEIGHT: 163.8 LBS | HEART RATE: 105 BPM | SYSTOLIC BLOOD PRESSURE: 102 MMHG | RESPIRATION RATE: 18 BRPM | BODY MASS INDEX: 24.83 KG/M2 | OXYGEN SATURATION: 94 % | DIASTOLIC BLOOD PRESSURE: 61 MMHG

## 2020-06-30 PROCEDURE — 99231 SBSQ HOSP IP/OBS SF/LOW 25: CPT | Performed by: INTERNAL MEDICINE

## 2020-06-30 PROCEDURE — 97116 GAIT TRAINING THERAPY: CPT | Mod: GP

## 2020-06-30 PROCEDURE — 25000132 ZZH RX MED GY IP 250 OP 250 PS 637: Performed by: STUDENT IN AN ORGANIZED HEALTH CARE EDUCATION/TRAINING PROGRAM

## 2020-06-30 PROCEDURE — 97530 THERAPEUTIC ACTIVITIES: CPT | Mod: GP

## 2020-06-30 PROCEDURE — 97535 SELF CARE MNGMENT TRAINING: CPT | Mod: GO | Performed by: OCCUPATIONAL THERAPIST

## 2020-06-30 PROCEDURE — 25000132 ZZH RX MED GY IP 250 OP 250 PS 637: Performed by: HOSPITALIST

## 2020-06-30 PROCEDURE — 25000132 ZZH RX MED GY IP 250 OP 250 PS 637: Performed by: INTERNAL MEDICINE

## 2020-06-30 PROCEDURE — 97530 THERAPEUTIC ACTIVITIES: CPT | Mod: GO | Performed by: OCCUPATIONAL THERAPIST

## 2020-06-30 RX ORDER — DIAZEPAM 5 MG
5 TABLET ORAL EVERY 6 HOURS PRN
Qty: 30 TABLET | Refills: 0 | Status: SHIPPED | OUTPATIENT
Start: 2020-06-30 | End: 2020-07-06

## 2020-06-30 RX ORDER — DIAZEPAM 5 MG
5 TABLET ORAL EVERY 6 HOURS PRN
Qty: 30 TABLET | Refills: 0 | Status: SHIPPED | OUTPATIENT
Start: 2020-06-30 | End: 2020-06-30

## 2020-06-30 RX ORDER — HYDROMORPHONE HYDROCHLORIDE 4 MG/1
4-6 TABLET ORAL
Qty: 30 TABLET | Refills: 0 | Status: SHIPPED | OUTPATIENT
Start: 2020-06-30 | End: 2020-07-06

## 2020-06-30 RX ADMIN — DOCUSATE SODIUM 50MG AND SENNOSIDES 8.6MG 1 TABLET: 8.6; 5 TABLET, FILM COATED ORAL at 07:50

## 2020-06-30 RX ADMIN — PREGABALIN 200 MG: 100 CAPSULE ORAL at 08:00

## 2020-06-30 RX ADMIN — HYDROMORPHONE HYDROCHLORIDE: 2 TABLET ORAL at 04:00

## 2020-06-30 RX ADMIN — DOCUSATE SODIUM 50MG AND SENNOSIDES 8.6MG 2 TABLET: 8.6; 5 TABLET, FILM COATED ORAL at 07:51

## 2020-06-30 RX ADMIN — OMEPRAZOLE 20 MG: 20 CAPSULE, DELAYED RELEASE ORAL at 07:49

## 2020-06-30 RX ADMIN — DIAZEPAM 5 MG: 5 TABLET ORAL at 10:19

## 2020-06-30 RX ADMIN — DIAZEPAM 5 MG: 5 TABLET ORAL at 10:22

## 2020-06-30 RX ADMIN — NICOTINE 1 PATCH: 21 PATCH, EXTENDED RELEASE TRANSDERMAL at 07:53

## 2020-06-30 RX ADMIN — HYDROMORPHONE HYDROCHLORIDE 8 MG: 2 TABLET ORAL at 07:47

## 2020-06-30 RX ADMIN — DIAZEPAM 5 MG: 5 TABLET ORAL at 04:09

## 2020-06-30 NOTE — PLAN OF CARE
Oriented x 4. PO dilaudid 8 mg and 5 mg of valium given for pain. Pain is tolerable.dressing to lumbar spine c/d/I, ice pack applied. Pt rested well until about 4 am and pain meds were given. Uses walker to get OOB. CMS and neuro's are intact. Cont to assess pt is hoping to discharge later today.

## 2020-06-30 NOTE — PLAN OF CARE
Discharge Planner PT   Patient plan for discharge: Home with PRN assist and home therapies  Current status: At this time goals adequately met for discharge. Patient SBA for all mobility, moves slow but is safe throughout. Able to complete stairs per home set-up with use of walker and SBA. No further questions endorsed. Issuing all equipment this morning prior to discharge.  Barriers to return to prior living situation: No PT barriers  Recommendations for discharge: Home with PRN assist and home therapies  Rationale for recommendations: For safety evaluation and progression    Physical Therapy Discharge Summary    Reason for therapy discharge:    All goals and outcomes met, no further needs identified.    Progress towards therapy goal(s). See goals on Care Plan in [facility] electronic health record for goal details.  Goals met    Therapy recommendation(s):    Continued therapy is recommended.  Rationale/Recommendations:  See above.           Entered by: Nydia Cagle 06/30/2020 9:51 AM

## 2020-06-30 NOTE — PROGRESS NOTES
"Orthopaedic Surgery Progress Note:  06/30/2020     Subjective:   NAEO. Some pain overnight, increased PO dilaudid, no longer taking IV dilaudid. Felt Valium made him too sleepy. Continues to work with PT. Ambulating from bed to chair and down hallway. Tolerating regular diet. Voiding spontaneously, having BMs.      Objective:   BP 95/63 (BP Location: Right arm)   Pulse 105   Temp 97.2  F (36.2  C) (Oral)   Resp 16   Ht 1.727 m (5' 8\")   Wt 74.3 kg (163 lb 12.8 oz)   SpO2 98%   BMI 24.91 kg/m    No intake/output data recorded.  Gen: NAD. Resting comfortably in bed.  Resp: Breathing comfortably on room air.  Musculoskeletal: Replaced dressing today - incision clean, dry, intact without drainage. Aquacel placed today.     Lower extremities:  Motor Strength Right Left   Hip flexion: L1, L2, L3 4/5 5/5   Hip adduction: L2, L3 4/5 5/5   Knee flexion: S1 4/5 5/5   Knee extension: L3, L4 4/5 5/5   Ankle dosiflexion: L4, L5 4+/5 5/5   EHL: L5 4+/5 5/5   Ankle plantarflexion: S1 4+/5 5/5     Sensation from L1-S2 is preserved.    Labs:  Lab Results   Component Value Date    WBC 9.1 06/27/2020    HGB 12.7 (L) 06/28/2020     (L) 06/27/2020        Assessment & Plan:   Ortega Burroughs is a 40 year old male with PMH including GERD, PTSD, Anxiety, panic disorder and insomnia now s/p L5-S1 on 6/26 with Dr. Waldrop.      Goals for today:  - Increase ambulation.   - Pain control.   - Patient would like to go home today.      Ortho Primary  Activity: Up with assist until independent. No excessive bending or twisting. No lifting >10 lbs x 6 weeks. No Makayla lift for transfers.   Weight bearing status: WBAT.  Pain management: PO dilaudid 6-8 mg, valium 5 mg. No NSAIDs Lidocaine gtt x24 hours  Antibiotics: Ancef x24 hours  Diet: Regular  DVT prophylaxis: SCDs only. No chemical DVT ppx needed.  Imaging: XR Lumbar spine PTDC - completed.  Labs: labs PRN  Bracing/Splinting: None  Dressings: Keep dressing c/d/i x7 days.  Drains: " Removed.  Murray catheter: Removed POD#2.  Physical Therapy/Occupational Therapy: Continue to work with PT to increase function and independent mobility.  Consults: Hospitalist.  Follow-up: Clinic with Dr. Waldrop in 6 weeks with repeat x-rays.   Disposition: Pending progress with therapies, pain control on orals, and medical stability, home today vs tomorrow.      Orthopaedics surgery staff for this patient is Dr. Waldrop.    Aditya Gallagher MD  Orthopedic Surgery, PGY-1  Pager: [phone]    FOLLOWUP:    Future Appointments   Date Time Provider Department Center   6/28/2020  8:30 AM Abdon Cleary, PT [facility] [place]   6/28/2020 11:00 AM Ute Pires OT [facility] [place]   6/28/2020  2:00 PM Abdon Cleary, PT [facility] [place]   8/7/2020 10:20 AM Dmitri Waldrop MD [facility]   9/18/2020  1:50 PM Dmitri Waldrop MD [facility]

## 2020-06-30 NOTE — PLAN OF CARE
Discharge Planner OT   Patient plan for discharge: Home today with home cares  Current status: Patient demonstrated independence in basic self-cares including toileting and dressing.  Still limited by pain and LB weakness but feels able to discharge home.  Barriers to return to prior living situation: None  Recommendations for discharge: Home with home cares for bathing/home OT  Rationale for recommendations: No further inpatient OT needs; discharge home today    Occupational Therapy Discharge Summary    Reason for therapy discharge:    Discharged to home with home therapy.  All goals and outcomes met, no further needs identified.    Progress towards therapy goal(s). See goals on Care Plan in [facility] electronic health record for goal details.  Goals met    Therapy recommendation(s):    Home OT for home safety evaluation and continuation of independence and safety with ADLs           Entered by: Rosemarie White 06/30/2020 11:27 AM

## 2020-06-30 NOTE — PLAN OF CARE
"      VS:   /80 (BP Location: Right arm)   Pulse 105   Temp 97.8  F (36.6  C) (Oral)   Resp 16   Ht 1.727 m (5' 8\")   Wt 74.3 kg (163 lb 12.8 oz)   SpO2 95%   BMI 24.91 kg/m     LS clear, BS active   Output:   Pt voids good amounts spontaneously w/o difficulty.    Activity:   Up with A of 1 and walker.  Repositions self in bed.   Skin: Intact with exception of posterior incision, skin tear above posterior dressing as well, starting to scab over.    Pain:   Pt reports pain has not been well controlled, spoke with ortho resident and pt's valium increased, and IV dilaudid added back to pain regimen. Pt reports pain in mid/lower back and upper buttocks that occasionally shoots down his legs. Ice packs applied to back. Pt feels that he has been playing catch up all day, and pain is improving slightly since valium was increased and he received IV dilaudid.   Neuro/CMS:   A&Ox4, CMS/Neuros intact, pt denies any numbness/tingling. Pt reports all numbness/tingling has resolved.    Dressing(s):   Posterior mepilex dressing CDI.    Diet:   Regular diet, tolerating well,    LDA:   PIV in L forearm SL.    Equipment:   Walker, PCDs.   Plan:   Possibly discharge home tomorrow with home care. Pt is able to make needs known, call light within reach- continue with POC.    Additional Info:          "

## 2020-06-30 NOTE — PROGRESS NOTES
Care Coordinator Progress Note    Admission Date/Time:  6/26/2020  Attending MD:  Vanessa att. providers found    Data  Chart reviewed, discussed with interdisciplinary team.   Patient was admitted for:    Lumbar radiculopathy  Lumbar radiculopathy  Lumbar disc herniation with radiculopathy  Status post lumbar spine surgery for decompression of spinal cord  Chronic pain disorder.    Concerns with insurance coverage for discharge needs: None.  Current Living Situation: Patient lives alone.  Support System: Involved  Services Involved: Home Care  Transportation at Discharge: Family or friend will provide  Transportation to Medical Appointments:   - Not applicable  Barriers to Discharge: NA    Coordination of Care and Referrals: Provided patient/family with options for Home Care.        Assessment  Several attempts were made to connect with the patient prior to discharge. Spoke with patient via phone after discharge and he agreed with the following plan. Patient has VA insurance. Currently patient is on service with [facility]. Patient will need home PT which [facility] does not offer. Sraina Hyde RN Case Manager with the VA was contacted to get authorization for home PT. The VA does not generally approve home PT. All necessary documentation was faxed to the patient's PCP, Dr. Lord at the [facility] for PT approval. A referral was sent to [facility], which is a VA approved home care agency. Referral was for skilled nursing, physical therapy and HHA. All therapy orders completed and faxed. RNCC available as needed.    [facility]   [address]  Phone [phone]  Fax [phone]    [facility]  Phone [phone]  Fax [phone]     Plan  Anticipated Discharge Date:  6/30/2020  Anticipated Discharge Plan:  Home with home care    Katharine Rodriguez RN, BSN  Care Coordinator, 8A  Phone [phone]  Pager [phone]

## 2020-06-30 NOTE — PROGRESS NOTES
Paged by RN regarding increasing pain. Patient felt Valium made him sleepy and did not help with adequate pain relief. I was agreeable to increasing po dilaudid to 6-8mg q3h.     Joycelyn Mcmahon MD  Orthopaedic Surgery, PGY4

## 2020-06-30 NOTE — PROGRESS NOTES
Patient was seen, course reviewed with nursing staff.    Patient notes fair pain control on recently increased dose of Dilaudid.  He hopes to discharge to home today.  Denies cough, chest pain, shortness of breath.  He is voiding without difficulty and having regular bowel movements.    Vital signs stable  Afebrile  O2 sats mid to upper 90s on room air    Alert, fully oriented, pleasant, working with therapies  Lungs clear  CV regular rhythm  Abdomen soft  No extremity edema      Assessment       S/P Lumbar 5 to Sacral 1 instrumented posterior interbody fusion and mchugh peters osteotomy , POD #4.  Patient has progressed in therapy and will likely discharge today to home    Acute on chronic pain under fair control with recent Dilaudid dosage adjustments.    GERD, stable    PTSD, anxiety disorder, stable    Plan  Probable discharge to home today.  Discharge pain medication orders deferred to orthopedic team.

## 2020-07-01 NOTE — PROGRESS NOTES
"[facility]: Post-Discharge Note  SITUATION                                                      Admission:    Admission Date: 07/26/20   Reason for Admission: Lumbar radiculopathy  Discharge:   Discharge Date: 06/30/20  Discharge Diagnosis: Lumbar radiculopathy  Discharge Service: Orthopedics    BACKGROUND                                                      Mr. Burroughs is a 40 year-old gentleman with a history of right-sided lumbar radiculopathy and previous R L5-S1 microdiscectomy in March 2020 with persistent pain and dysfunction in his low back and right lower leg.  He was seen by Dr. Waldrop virtually on 6/19/2020 when repeat surgery was decided upon.  From Dr. Waldrop's note on that date,     \"Leg is basically unchanged.  He is able to find a position of comfort if he flexes his knees up to his chest.  Otherwise he still having significant pain in the leg.  He is walking with a cane.  When I saw him on Tuesday he was basically 4 out of 5 strength all throughout the leg with a significantly positive straight leg raise and we started him on prednisone and he started this on Wednesday but it has not made a ton of difference for him.... However, in talking with the patient multiple times at this point, I am convinced that he is not improving, and he cannot mobilize.  He has severe pain with diffuse weakness in the leg.  Given this, and the fact that there are no other non-operative options, we agreed to proceed with scheduling a TLIF for him with a plan for ICAG and stealth navigation.  I talked to him on the phone today about risks and he wishes to proceed.\"    ASSESSMENT      Discharge Assessment  Patient reports symptoms are: Unchanged([facility] nurse is coming out today)  Does the patient have all of their medications?: Yes  Does patient know what their new medications are for?: Yes  Does patient have a follow-up appointment scheduled?: Yes  Does patient have any other questions or concerns?: " "No    Post-op  Did the patient have surgery or a procedure: Yes  Incision: healing  Drainage: Yes(Patient reports the bandage \"seems wet but I can't see it\".)  Incision Drainage Amount: light  Bleeding: none  Fever: No  Chills: No  Redness: No  Warmth: No  Swelling: No  Incision site pain: No  Eating & Drinking: eating and drinking without complaints/concerns  PO Intake: regular diet  Bowel Function: normal  Urinary Status: voiding without complaint/concerns    PLAN                                                      Future Appointments   Date Time Provider Department Center   8/7/2020 10:20 AM Dmitri Waldrop MD [facility]   9/18/2020  1:50 PM Dmitri Waldrop MD [facility]           Shannan Dominguez [facility]                "

## 2020-07-06 ENCOUNTER — TELEPHONE (OUTPATIENT)
Dept: ORTHOPEDICS | Facility: CLINIC | Age: 40
End: 2020-07-06

## 2020-07-06 DIAGNOSIS — M54.16 LUMBAR RADICULOPATHY: ICD-10-CM

## 2020-07-06 RX ORDER — HYDROMORPHONE HYDROCHLORIDE 4 MG/1
4-6 TABLET ORAL
Qty: 45 TABLET | Refills: 0 | Status: SHIPPED | OUTPATIENT
Start: 2020-07-06 | End: 2020-07-10

## 2020-07-06 RX ORDER — ACETAMINOPHEN 325 MG/1
650 TABLET ORAL EVERY 4 HOURS PRN
Qty: 1 BOTTLE | Refills: 0 | Status: SHIPPED | OUTPATIENT
Start: 2020-07-06

## 2020-07-06 RX ORDER — AMOXICILLIN 250 MG
1 CAPSULE ORAL 2 TIMES DAILY
Qty: 30 TABLET | Refills: 0 | Status: SHIPPED | OUTPATIENT
Start: 2020-07-06 | End: 2020-07-10

## 2020-07-06 RX ORDER — DIAZEPAM 5 MG
5 TABLET ORAL EVERY 6 HOURS PRN
Qty: 30 TABLET | Refills: 0 | Status: SHIPPED | OUTPATIENT
Start: 2020-07-06 | End: 2020-07-10

## 2020-07-06 NOTE — TELEPHONE ENCOUNTER
RN has sent refill for patient in previous mychart encounter.    Kwaku Romano RN      [facility] Call Center    Phone Message    May a detailed message be left on voicemail: yes     Reason for Call: Medication Refill Request    Has the patient contacted the pharmacy for the refill? Yes   Name of medication being requested:   1. diazepam (VALIUM) 5 MG tablet   2. HYDROmorphone (DILAUDID) 4 MG tablet   3. acetaminophen (TYLENOL) 325 MG tablet   4. polyethylene glycol (MIRALAX) 17 g packet   5. senna-docusate (SENOKOT-S/PERICOLACE) 8.6-50 MG tablet  Provider who prescribed the medication: Benjie  Pharmacy:    [id number] - [place], [place] - [address] AT [facility] & [facility]    Date medication is needed: ASAP, pt stated pt was out over the weekend      Action Taken: Message routed to:  [facility] ([facility] ORTHO    Travel Screening: Not Applicable

## 2020-07-06 NOTE — TELEPHONE ENCOUNTER
RN send refill request to Jami MENDIETA to sign and authorize.    RN notified patient via [employer].    Kwaku Romano RN

## 2020-07-10 DIAGNOSIS — M54.16 LUMBAR RADICULOPATHY: ICD-10-CM

## 2020-07-10 RX ORDER — DIAZEPAM 5 MG
5 TABLET ORAL EVERY 6 HOURS PRN
Qty: 30 TABLET | Refills: 0 | Status: SHIPPED | OUTPATIENT
Start: 2020-07-10 | End: 2020-07-16

## 2020-07-10 RX ORDER — HYDROMORPHONE HYDROCHLORIDE 4 MG/1
4-6 TABLET ORAL EVERY 4 HOURS PRN
Qty: 45 TABLET | Refills: 0 | Status: SHIPPED | OUTPATIENT
Start: 2020-07-10 | End: 2020-07-16

## 2020-07-10 RX ORDER — AMOXICILLIN 250 MG
1 CAPSULE ORAL 2 TIMES DAILY
Qty: 30 TABLET | Refills: 0 | Status: SHIPPED | OUTPATIENT
Start: 2020-07-10 | End: 2020-07-16

## 2020-07-10 NOTE — TELEPHONE ENCOUNTER
RN sent med refill to Jami MENDIETA to sign and authorize.    RN notified patient via [employer].    Kwaku Romano RN

## 2020-07-16 DIAGNOSIS — M54.16 LUMBAR RADICULOPATHY: ICD-10-CM

## 2020-07-16 RX ORDER — DIAZEPAM 5 MG
5 TABLET ORAL EVERY 6 HOURS PRN
Qty: 30 TABLET | Refills: 0 | Status: SHIPPED | OUTPATIENT
Start: 2020-07-16 | End: 2020-07-22

## 2020-07-16 RX ORDER — AMOXICILLIN 250 MG
1 CAPSULE ORAL 2 TIMES DAILY
Qty: 30 TABLET | Refills: 0 | Status: SHIPPED | OUTPATIENT
Start: 2020-07-16 | End: 2020-07-22

## 2020-07-16 RX ORDER — HYDROMORPHONE HYDROCHLORIDE 4 MG/1
4 TABLET ORAL EVERY 4 HOURS PRN
Qty: 45 TABLET | Refills: 0 | Status: SHIPPED | OUTPATIENT
Start: 2020-07-16 | End: 2020-07-22

## 2020-07-16 NOTE — TELEPHONE ENCOUNTER
RN sent med refill to Jami MENDIETA to sign and authorize    [facility] Call Center    Phone Message    May a detailed message be left on voicemail: yes     Reason for Call: Medication Refill Request    Has the patient contacted the pharmacy for the refill? Yes     Name of medication being requested: HYDROmorphone (DILAUDID) 4 MG tablet   Provider who prescribed the medication: Dr Waldrop  Pharmacy: [facility] #[id number] - [place], [place] - [address] AT [facility]   Date medication is needed: ASAP   .  Name of medication being requested: diazepam (VALIUM) 5 MG tablet  Provider who prescribed the medication: Dr Waldrop  [id number] - [place], [place] - [address]   Date medication is needed: ASAP     Name of medication being requested: senna-docusate (SENOKOT-S/PERICOLACE) 8.6-50 MG tablet   Provider who prescribed the medication: Dr Waldrop  [id number] - [place], [place] - [address] AT [facility]   Date medication is needed: ASAP            Action Taken: Message routed to:  [facility]o    Travel Screening: Not Applicable

## 2020-07-22 DIAGNOSIS — M54.16 LUMBAR RADICULOPATHY: ICD-10-CM

## 2020-07-22 RX ORDER — DIAZEPAM 5 MG
5 TABLET ORAL EVERY 6 HOURS PRN
Qty: 30 TABLET | Refills: 0 | Status: SHIPPED | OUTPATIENT
Start: 2020-07-22 | End: 2020-08-04

## 2020-07-22 RX ORDER — HYDROMORPHONE HYDROCHLORIDE 4 MG/1
4 TABLET ORAL EVERY 6 HOURS PRN
Qty: 64 TABLET | Refills: 0 | Status: SHIPPED | OUTPATIENT
Start: 2020-07-22 | End: 2020-08-04

## 2020-07-22 RX ORDER — AMOXICILLIN 250 MG
1 CAPSULE ORAL 2 TIMES DAILY
Qty: 30 TABLET | Refills: 0 | Status: SHIPPED | OUTPATIENT
Start: 2020-07-22 | End: 2020-08-04

## 2020-07-22 NOTE — TELEPHONE ENCOUNTER
RN sent med refill to Jeff MENDIETA to sign and authorize.    Kwaku Romano RN      [facility] Call Center    Phone Message    May a detailed message be left on voicemail: yes     Reason for Call: Medication Refill Request    Has the patient contacted the pharmacy for the refill? Yes   Name of medication being requested:   senna-docusate (SENOKOT-S/PERICOLACE) 8.6-50 MG tablet   Provider who prescribed the medication: Jami Torres  Pharmacy: [facility] #[id number] - [place], [place] - [address] AT [facility]   Date medication is needed: ASAP       Name of medication being requested:   HYDROmorphone (DILAUDID) 4 MG tablet   Provider who prescribed the medication: Jami Vananson  Pharmacy: [facility] #[id number] - [place], [place] - [address]   Date medication is needed: ASAP      Name of medication being requested:   diazepam (VALIUM) 5 MG tablet   Provider who prescribed the medication: Jami Vananson  Pharmacy: [facility] #[id number] - [place], [place] - [address]   Date medication is needed: ASAP      Action Taken: Message routed to:  [facility] ORtho    Travel Screening: Not Applicable

## 2020-08-03 ENCOUNTER — TELEPHONE (OUTPATIENT)
Dept: SURGERY | Facility: CLINIC | Age: 40
End: 2020-08-03

## 2020-08-03 DIAGNOSIS — M54.16 LUMBAR RADICULOPATHY: ICD-10-CM

## 2020-08-03 NOTE — TELEPHONE ENCOUNTER
RN send med refill to Jami MENDIETA to sign and authorize.    Kwaku Romano RN       [facility] Call Center    Phone Message    May a detailed message be left on voicemail: yes     Reason for Call: Medication Refill Request      Name of medication being requested:   1) diazepam (Valiume 5mg tablet)  2) HYDROmprhone (Dilaudid) 4mg tablet  3) senna-docusate (senokot-s/pericolace) 8.6-50mg tablet)    Provider who prescribed the medication: Dr. Dmitri Waldrop    Pharmacy: [facility] #[id number]    Date medication is needed: ASAP       Action Taken: Message routed to:  [facility]    Travel Screening: Not Applicable

## 2020-08-04 RX ORDER — AMOXICILLIN 250 MG
1 CAPSULE ORAL 2 TIMES DAILY
Qty: 30 TABLET | Refills: 0 | Status: SHIPPED | OUTPATIENT
Start: 2020-08-04 | End: 2020-08-11

## 2020-08-04 RX ORDER — DIAZEPAM 5 MG
5 TABLET ORAL EVERY 8 HOURS PRN
Qty: 30 TABLET | Refills: 0 | Status: SHIPPED | OUTPATIENT
Start: 2020-08-04 | End: 2020-08-11

## 2020-08-04 RX ORDER — HYDROMORPHONE HYDROCHLORIDE 4 MG/1
4 TABLET ORAL EVERY 8 HOURS PRN
Qty: 30 TABLET | Refills: 0 | Status: SHIPPED | OUTPATIENT
Start: 2020-08-04

## 2020-08-04 NOTE — TELEPHONE ENCOUNTER
Called patient and left VM. Patient should start to wean off dilaudid over next few weeks. Start stretching to 1 tablet every 8 hours instead of every 6 hours. He is scheduled for 6 week follow up in Dr. Waldrop's clinic on Friday.   Sent refills in to pharmacy as requested.    Jami Torres PA-C

## 2020-08-04 NOTE — TELEPHONE ENCOUNTER
Called patient's pharmacy and gave verbal OK to fill patient's dilaudid 2 days early.     Called patient and spoke with him on the phone. Informed him his pharmacy would be filling the refill now. Explained that I changed dilauded prescription from every 6 hours PRN to every 8 hours PRN severe pain. Recommend he take tylenol and valium PRN in between doses of narcotic to help get through the day. Patient verbalized understanding and states pain is becoming more manageable. He did not have any other questions today.    Jami Torres PA-C

## 2020-08-04 NOTE — TELEPHONE ENCOUNTER
[facility] Call [facility]    Phone Message    May a detailed message be left on voicemail: yes     Reason for Call: Other:      Vinh is calling in because his [id number] told him that he is to early to  his refill of HYDROmprhone (Dilaudid) 4mg tablet.         [facility] says that the clinic needs to call and tell them its ok for him to pick this refill up early.     Please call vinh back with any other questions.       Action Taken: Message routed to:  [facility] Ortho    Travel Screening: Not Applicable

## 2020-08-06 DIAGNOSIS — M54.16 LUMBAR RADICULOPATHY: Primary | ICD-10-CM

## 2020-08-07 ENCOUNTER — TELEPHONE (OUTPATIENT)
Dept: ORTHOPEDICS | Facility: CLINIC | Age: 40
End: 2020-08-07

## 2020-08-07 NOTE — TELEPHONE ENCOUNTER
RN called and spoke with patient. Per patient, [facility] and [facility] patient the doctors there gave him antibiotics for for suspicions of infection from the incision site. Patient is 6 weeks out and was supposed to come in for his 6th week post op appt. RN consulted with Dr. Waldrop. Dr. Waldrop would like to see if patient can send us the pics. If not, he should go to the ED at the hospital so one of the residents can take a look at it.   Patient will send us pics from [facility] and RN will keep an eye on it.  RN later notified Dr. Waldrop.    Kwaku Romano RN

## 2020-08-07 NOTE — TELEPHONE ENCOUNTER
RN called and spoke with patient. RN told patient that Dr. Waldrop has reviewed the wound pics. Everything looks good, the sutures have to come out as it is passed the due date and also sometimes sutures can cause irritation. Since patient is having fever and per [facility] protocol, patient will not be able to come in. RN asked [facility] if not he will have to have someone he knows to come to the [facility] and  suture removal kit.  Patient said he will call [facility] and set up appt to remove the sutures. RN asked patient to keep us posted. RN also later notified Arron to reschedule patient to a virtual visit next week.  Patient expressed understanding.    Kwaku Romano RN

## 2020-08-11 DIAGNOSIS — M54.16 LUMBAR RADICULOPATHY: ICD-10-CM

## 2020-08-11 RX ORDER — AMOXICILLIN 250 MG
1 CAPSULE ORAL 2 TIMES DAILY
Qty: 30 TABLET | Refills: 0 | Status: SHIPPED | OUTPATIENT
Start: 2020-08-11 | End: 2020-08-27

## 2020-08-11 RX ORDER — HYDROCODONE BITARTRATE AND ACETAMINOPHEN 5; 325 MG/1; MG/1
1 TABLET ORAL EVERY 4 HOURS PRN
Qty: 60 TABLET | Refills: 0 | Status: SHIPPED | OUTPATIENT
Start: 2020-08-11 | End: 2020-08-18

## 2020-08-11 RX ORDER — DIAZEPAM 5 MG
5 TABLET ORAL EVERY 8 HOURS PRN
Qty: 30 TABLET | Refills: 0 | Status: SHIPPED | OUTPATIENT
Start: 2020-08-11

## 2020-08-18 DIAGNOSIS — G89.18 POST-OP PAIN: ICD-10-CM

## 2020-08-18 RX ORDER — HYDROCODONE BITARTRATE AND ACETAMINOPHEN 5; 325 MG/1; MG/1
1-2 TABLET ORAL EVERY 4 HOURS PRN
Qty: 60 TABLET | Refills: 0 | Status: SHIPPED | OUTPATIENT
Start: 2020-08-18 | End: 2020-08-27

## 2020-08-18 NOTE — TELEPHONE ENCOUNTER
RN mycharted patient and told him Jami's plan. It is for Norco, not oxycodone.     RN then sent the med refill request to Jami MENDIETA to sign and authorize.    Kwaku Romano RN      [facility] Call Center    Phone Message    May a detailed message be left on voicemail: yes     Reason for Call: Other: RX Controlled Refill Request, Oxycodone, D.O.S. 6/26/2020,  Pt. states he is out of Oxycodone, and took a few extra for pain, needs a refill [facility] [address]. [place] . Please call Pt. back at [phone]. Pt. states he is out and needs right away.      Action Taken: Message routed to:  [facility] Ortho     Travel Screening: Not Applicable

## 2020-08-18 NOTE — TELEPHONE ENCOUNTER
Called and spoke with patient about pain management. He says that Norco has been working well for pain control over the past week; however, he has had a few days where pain is increased and he has had to take 2 tablets at a time to control pain. I will refill norco today, and change to 1-2 tablets q 4 hours PRN severe pain. I strongly encouraged patient to start with only 1 tablet at a time and to only take 2 tablets if pain is uncontrollable. Also encouraged him to start stretching out the doses to every 6 hours PRN sever pain as tolerated. Patient understanded all this and was agreeable with the plan. Sent refill to his pharmacy.    Jami Torres PA-C

## 2020-08-27 DIAGNOSIS — G89.18 POST-OP PAIN: ICD-10-CM

## 2020-08-27 DIAGNOSIS — M54.16 LUMBAR RADICULOPATHY: ICD-10-CM

## 2020-08-27 RX ORDER — HYDROCODONE BITARTRATE AND ACETAMINOPHEN 5; 325 MG/1; MG/1
1-2 TABLET ORAL EVERY 6 HOURS PRN
Qty: 40 TABLET | Refills: 0 | Status: SHIPPED | OUTPATIENT
Start: 2020-08-27 | End: 2020-09-08

## 2020-08-27 RX ORDER — AMOXICILLIN 250 MG
1 CAPSULE ORAL 2 TIMES DAILY
Qty: 30 TABLET | Refills: 0 | Status: SHIPPED | OUTPATIENT
Start: 2020-08-27

## 2020-08-27 NOTE — TELEPHONE ENCOUNTER
RN send med refill to Re Torres to refill. Not sure what stimulant 606.   Will have Re call the patient to verify.    Kwaku Romano RN      [facility] Call Center    Phone Message    May a detailed message be left on voicemail: yes     Reason for Call: Medication Refill Request    Has the patient contacted the pharmacy for the refill? Yes   Name of medication being requested: hydrocodone and stimulant 806  Provider who prescribed the medication: re torres  Pharmacy: [facility] ([place], [place])  Date medication is needed: 08/28/2020        Action Taken: Message routed to:  [facility] ortho    Travel Screening: Not Applicable

## 2020-08-27 NOTE — TELEPHONE ENCOUNTER
"Called and spoke with patient. Not sure what \"stimulant 806\" meant-- patient says he forgot the name of the laxitive he was taking. Informed him that it is called senna, and I will happily refill for him today in addition to his Norco.  Patient says he is slowly starting to feel better and stretch out time between pain medications. States that he normally takes about 3-4 Norco per day, but occasionally has bad days where he needs to take more. I congratulated patient on progress so far. Sent refills to his pharmacy.    Jami Torres PA-C  "

## 2020-09-08 DIAGNOSIS — G89.18 POST-OP PAIN: ICD-10-CM

## 2020-09-08 RX ORDER — HYDROCODONE BITARTRATE AND ACETAMINOPHEN 5; 325 MG/1; MG/1
1 TABLET ORAL EVERY 6 HOURS PRN
Qty: 40 TABLET | Refills: 0 | Status: SHIPPED | OUTPATIENT
Start: 2020-09-08

## 2020-11-06 NOTE — TELEPHONE ENCOUNTER
[facility]    Phone Message    May a detailed message be left on voicemail: yes     Reason for Call: Other:      Breanna is calling in to confirm if Dr [facility]drop's [facility].  Please call her back to discuss.  Anyone can help.       Action Taken: Message routed to:  [facility] Ortho    Travel Screening: Not Applicable                                                                       Detail Level: Detailed Detail Level: Simple

## 2021-01-04 ENCOUNTER — HEALTH MAINTENANCE LETTER (OUTPATIENT)
Age: 41
End: 2021-01-04

## 2021-10-10 ENCOUNTER — HEALTH MAINTENANCE LETTER (OUTPATIENT)
Age: 41
End: 2021-10-10

## 2022-01-30 ENCOUNTER — HEALTH MAINTENANCE LETTER (OUTPATIENT)
Age: 42
End: 2022-01-30

## 2022-09-18 ENCOUNTER — HEALTH MAINTENANCE LETTER (OUTPATIENT)
Age: 42
End: 2022-09-18

## 2023-05-08 ENCOUNTER — HEALTH MAINTENANCE LETTER (OUTPATIENT)
Age: 43
End: 2023-05-08

## 2024-05-14 NOTE — PROGRESS NOTES
"   06/27/20 0900   Quick Adds   Type of Visit Initial PT Evaluation   Living Environment   Lives With friend(s)   Living Arrangements house   Home Accessibility stairs to enter home  (friends live upstairs but reportd doesnt have to go up)   Number of Stairs, Main Entrance 3   Stair Railings, Main Entrance none   Transportation Anticipated family or friend will provide   Living Environment Comment Plans to go home with home health, some assist available but inconsistent   Self-Care   Usual Activity Tolerance good   Current Activity Tolerance moderate   Regular Exercise Yes   Activity/Exercise Type swimming   Exercise Amount/Frequency daily   Equipment Currently Used at Home cane, straight  (would like walker and shower chair)   Activity/Exercise/Self-Care Comment 3 R knee surgeries, hernia surg, \"many injuries from army,\" 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 ambulation;transferring;fall history   Prior Functional Level Comment Uses cane at baseline   General Information   Onset of Illness/Injury or Date of Surgery - Date 06/26/20   Referring Physician Dr. Waldrop   Patient/Family Goals Statement To get better and go home   Pertinent History of Current Problem (include personal factors and/or comorbidities that impact the POC) From chart: Ortega Burroughs is a 40 year old male  with Hx of GERD, PTSD, Anxiety, Panic disorder, insomnia, tobacco use disorder. He had micro discectomy by Dr Waldrop in March " 2020. For recurrence of symptoms he underwent Lumbar 5 to Sacral 1 instrumented posterior interbody fusion and mchugh peters osteotomy by Dr Waldrop on 06/26/20.   Precautions/Limitations spinal precautions   Weight-Bearing Status - LLE weight-bearing as tolerated   Weight-Bearing Status - RLE weight-bearing as tolerated   Heart Disease Risk Factors Smoking   General Observations Male supine in bed   Cognitive Status Examination   Orientation orientation to person, place and time   Level of Consciousness alert   Follows Commands and Answers Questions 100% of the time   Personal Safety and Judgment intact   Memory intact   Pain Assessment   Patient Currently in Pain Yes, see Vital Sign flowsheet  (8/10)   Integumentary/Edema   Integumentary/Edema Comments Spinal incision   Posture    Posture Forward head position   Range of Motion (ROM)   ROM Comment Grossly WFL   Strength   Strength Comments Adequate for ambulation with AD   Bed Mobility   Bed Mobility Comments Supine-sit with modA for LEs   Transfer Skills   Transfer Comments Sit-stand with Rita and walker   Gait   Gait Comments Not yet assessed   Balance   Balance Comments Adequate standing balance with walker   Sensory Examination   Sensory Perception Comments L arm tingling   Coordination   Coordination no deficits were identified   Muscle Tone   Muscle Tone no deficits were identified   Modality Interventions   Planned Modality Interventions Cryotherapy   General Therapy Interventions   Planned Therapy Interventions bed mobility training;gait training;strengthening;stretching;transfer training;progressive activity/exercise   Clinical Impression   Criteria for Skilled Therapeutic Intervention yes, treatment indicated   PT Diagnosis Impaired functional mobility   Influenced by the following impairments S/p surgery, pain   Functional limitations due to impairments All functional mobility   Clinical Presentation Stable/Uncomplicated   Clinical Presentation  "Rationale Few contributing comorbidities   Clinical Decision Making (Complexity) Low complexity   Therapy Frequency 2x/day   Predicted Duration of Therapy Intervention (days/wks) 7 days   Anticipated Equipment Needs at Discharge walker   Anticipated Discharge Disposition Home with Assist;Home with Home Therapy;Home with Outpatient Therapy   Risk & Benefits of therapy have been explained Yes   Patient, Family & other staff in agreement with plan of care Yes   [facility] AM-PAC  \"6 Clicks\" V.2 Basic Mobility Inpatient Short Form   1. Turning from your back to your side while in a flat bed without using bedrails? 2 - A Lot   2. Moving from lying on your back to sitting on the side of a flat bed without using bedrails? 2 - A Lot   3. Moving to and from a bed to a chair (including a wheelchair)? 2 - A Lot   4. Standing up from a chair using your arms (e.g., wheelchair, or bedside chair)? 3 - A Little   5. To walk in hospital room? 2 - A Lot   6. Climbing 3-5 steps with a railing? 1 - Total   Basic Mobility Raw Score (Score out of 24.Lower scores equate to lower levels of function) 12   Total Evaluation Time   Total Evaluation Time (Minutes) 10     "

## 2024-11-14 NOTE — TELEPHONE ENCOUNTER
Called and spoke with patient about pain management. He states he feels like dilaudid is too strong of a narcotic for him at this point in his recovery. He is taking tylenol, and states that this is almost enough to control his pain, but not quite. Gave patient a few options for pain management. He does not want to try tramadol because he has had bad reaction from this in the past. Could try oxycodone because it is less strong than dilaudid; however, since he says that pain is almost controlled with tylenol alone, we might want to try Norco. Recommended stopping taking tylenol 650mg Q4hr while on Norco because we don't want him to overdose on tylenol with the addition of Norco. Patient is agreeable with this plan. He also needs refills of valium and senna. Will fill Norco, senna, and valium for him today.    Jami Torres PA-C   Statement Selected

## (undated) DEVICE — SUCTION MANIFOLD DORNOCH ULTRA CART UL-CL500

## (undated) DEVICE — DRAPE STERI TOWEL LG 1010

## (undated) DEVICE — TUBING SUCTION MEDI-VAC SOFT 3/16"X20' N520A

## (undated) DEVICE — LINEN BACK PACK 5440

## (undated) DEVICE — ESU GROUND PAD UNIVERSAL W/O CORD

## (undated) DEVICE — Device

## (undated) DEVICE — SU PROLENE 0 CTX 30" 8454H

## (undated) DEVICE — RX SURGIFLO HEMOSTATIC MATRIX W/THROMBIN 8ML NEXTGEN 2993

## (undated) DEVICE — SPECIMEN CONTAINER 5OZ STERILE 2600SA

## (undated) DEVICE — SOL NACL 0.9% IRRIG 1000ML BOTTLE 2F7124

## (undated) DEVICE — DRAPE LAP W/ARMBOARD 29410

## (undated) DEVICE — CATH TRAY FOLEY SURESTEP 16FR WDRAIN BAG STLK LATEX A300316A

## (undated) DEVICE — SU VICRYL 2-0 CT-2 8X18" UND D8144

## (undated) DEVICE — DRAPE POUCH INSTRUMENT 1018

## (undated) DEVICE — BASIN SET MAJOR

## (undated) DEVICE — DRSG KERLIX FLUFFS X5

## (undated) DEVICE — PACK SPINE INSTRUMENT DRAPE 76091-ACM7820

## (undated) DEVICE — DRAPE IOBAN INCISE 23X17" 6650EZ

## (undated) DEVICE — GLOVE PROTEXIS W/NEU-THERA 8.5  2D73TE85

## (undated) DEVICE — BLADE CLIPPER SGL USE 9680

## (undated) DEVICE — GOWN IMPERVIOUS SPECIALTY XLG/XLONG 32474

## (undated) DEVICE — GOWN IMPERVIOUS ZONED XLG 9041

## (undated) DEVICE — LINEN TOWEL PACK X30 5481

## (undated) DEVICE — SPONGE COTTONOID 1X1" 80-1403

## (undated) DEVICE — BLADE BONE MILL STRK 5.0MM MED 5400-701-000

## (undated) DEVICE — SPONGE COTTONOID 1/2X1/2" 80-1400

## (undated) DEVICE — ESU CORD BIPOLAR GREEN 10-4000

## (undated) DEVICE — LINEN GOWN OVERSIZE 5408

## (undated) DEVICE — BONE WAX 2.5GM W31G

## (undated) DEVICE — DRSG TEGADERM 4X4 3/4" 1626W

## (undated) DEVICE — DRAPE MAYO STAND 23X54 8337

## (undated) DEVICE — SU MONOCRYL 3-0 PS-2 18" UND Y497G

## (undated) DEVICE — PREP CHLORAPREP 26ML TINTED ORANGE  260815

## (undated) DEVICE — 4X8

## (undated) DEVICE — DRSG DRAIN 4X4" 7086

## (undated) DEVICE — SU VICRYL 1 CT-1 CR 8X18" J741D

## (undated) DEVICE — NDL SPINAL 18GA 3.5" 405184

## (undated) DEVICE — GLOVE PROTEXIS W/NEU-THERA 7.5  2D73TE75

## (undated) DEVICE — DRSG GAUZE 4X8" NON21842

## (undated) DEVICE — ADH SKIN CLOSURE PREMIERPRO EXOFIN 1.0ML 3470

## (undated) DEVICE — GLOVE PROTEXIS BLUE W/NEU-THERA 8.0  2D73EB80

## (undated) DEVICE — TAPE MEDIPORE 4"X2YD 2864

## (undated) DEVICE — DRAIN HEMOVAC RESERVOIR KIT 10FR 1/8" MED 00-2550-002-10

## (undated) DEVICE — SPONGE KITTNER 31001010

## (undated) DEVICE — MIDAS REX DISSECTING TOOL 4MM BALL 140MM 14BA40

## (undated) DEVICE — SUCTION MINISQUAIR SMOKE EVAC CAPTURE DEVICE SQ20012-01

## (undated) DEVICE — SOL ISOPROPYL RUBBING ALCOHOL USP 70% 4OZ HDX-20 I0020

## (undated) DEVICE — NDL 22GA 1.5"

## (undated) DEVICE — MIDAS REX DIFFUSER LUBRICANT LEGEND PA100-A

## (undated) DEVICE — DRSG KERLIX 4 1/2"X4YDS ROLL 6730

## (undated) DEVICE — LINEN GOWN X4 5410

## (undated) DEVICE — BRUSH SURGICAL SCRUB W/4% CHLORHEXIDINE GLUCONATE SOL 4458A

## (undated) DEVICE — POSITIONER ARMBOARD FOAM 1PAIR LF FP-ARMB1

## (undated) DEVICE — SYR 50ML LL W/O NDL 309653

## (undated) DEVICE — DECANTER TRANSFER DEVICE 2008S

## (undated) DEVICE — WIPES FOLEY CARE SURESTEP PROVON DFC100

## (undated) DEVICE — SYR 03ML LL W/O NDL 309657

## (undated) DEVICE — MIDAS REX DISSECTING TOOL  14MH30

## (undated) DEVICE — SPONGE RAY-TEC 4X8" 7318

## (undated) DEVICE — PACK SET-UP STD 9102

## (undated) DEVICE — GLOVE PROTEXIS BLUE W/NEU-THERA 8.5  2D73EB85

## (undated) DEVICE — ESU ELEC BLADE 2.75" COATED/INSULATED E1455

## (undated) DEVICE — DRAPE O ARM TUBE 9732722

## (undated) DEVICE — KIT PATIENT CARE PROAXIS SYSTEM 6988-PV-ACP

## (undated) DEVICE — SU VICRYL 1 CT-1 27" UND J261H

## (undated) DEVICE — TUBING SUCTION MEDI-VAC 1/4"X20' N620A

## (undated) DEVICE — MARKER SPHERES PASSIVE MEDT PACK 5 8801075

## (undated) DEVICE — NDL COUNTER 20CT 31142493

## (undated) DEVICE — SOL WATER IRRIG 1000ML BOTTLE 2F7114

## (undated) DEVICE — STRAP KNEE/BODY 31143004

## (undated) DEVICE — SPONGE COTTONOID 1X3" 80-1408

## (undated) DEVICE — DRSG TEGADERM 4X10" 1627

## (undated) RX ORDER — PHENYLEPHRINE HCL IN 0.9% NACL 1 MG/10 ML
SYRINGE (ML) INTRAVENOUS
Status: DISPENSED
Start: 2020-06-26

## (undated) RX ORDER — BUPIVACAINE HYDROCHLORIDE AND EPINEPHRINE 2.5; 5 MG/ML; UG/ML
INJECTION, SOLUTION INFILTRATION; PERINEURAL
Status: DISPENSED
Start: 2020-06-04

## (undated) RX ORDER — CEFAZOLIN SODIUM 2 G/100ML
INJECTION, SOLUTION INTRAVENOUS
Status: DISPENSED
Start: 2020-03-09

## (undated) RX ORDER — PROPOFOL 10 MG/ML
INJECTION, EMULSION INTRAVENOUS
Status: DISPENSED
Start: 2020-06-26

## (undated) RX ORDER — ONDANSETRON 2 MG/ML
INJECTION INTRAMUSCULAR; INTRAVENOUS
Status: DISPENSED
Start: 2020-06-04

## (undated) RX ORDER — HYDROCODONE BITARTRATE AND ACETAMINOPHEN 5; 325 MG/1; MG/1
TABLET ORAL
Status: DISPENSED
Start: 2020-03-09

## (undated) RX ORDER — LORAZEPAM 2 MG/ML
INJECTION INTRAMUSCULAR
Status: DISPENSED
Start: 2020-06-04

## (undated) RX ORDER — DIAZEPAM 5 MG
TABLET ORAL
Status: DISPENSED
Start: 2020-06-26

## (undated) RX ORDER — FENTANYL CITRATE 50 UG/ML
INJECTION, SOLUTION INTRAMUSCULAR; INTRAVENOUS
Status: DISPENSED
Start: 2020-06-26

## (undated) RX ORDER — ONDANSETRON 2 MG/ML
INJECTION INTRAMUSCULAR; INTRAVENOUS
Status: DISPENSED
Start: 2020-03-09

## (undated) RX ORDER — FENTANYL CITRATE 50 UG/ML
INJECTION, SOLUTION INTRAMUSCULAR; INTRAVENOUS
Status: DISPENSED
Start: 2020-03-09

## (undated) RX ORDER — ACETAMINOPHEN 325 MG/1
TABLET ORAL
Status: DISPENSED
Start: 2020-03-09

## (undated) RX ORDER — DEXAMETHASONE SODIUM PHOSPHATE 4 MG/ML
INJECTION, SOLUTION INTRA-ARTICULAR; INTRALESIONAL; INTRAMUSCULAR; INTRAVENOUS; SOFT TISSUE
Status: DISPENSED
Start: 2020-06-04

## (undated) RX ORDER — VANCOMYCIN HYDROCHLORIDE 500 MG/10ML
INJECTION, POWDER, LYOPHILIZED, FOR SOLUTION INTRAVENOUS
Status: DISPENSED
Start: 2020-06-04

## (undated) RX ORDER — FENTANYL CITRATE 50 UG/ML
INJECTION, SOLUTION INTRAMUSCULAR; INTRAVENOUS
Status: DISPENSED
Start: 2020-06-04

## (undated) RX ORDER — CEFAZOLIN SODIUM 1 G/3ML
INJECTION, POWDER, FOR SOLUTION INTRAMUSCULAR; INTRAVENOUS
Status: DISPENSED
Start: 2020-06-26

## (undated) RX ORDER — HYDROMORPHONE HYDROCHLORIDE 1 MG/ML
INJECTION, SOLUTION INTRAMUSCULAR; INTRAVENOUS; SUBCUTANEOUS
Status: DISPENSED
Start: 2020-06-04

## (undated) RX ORDER — OXYCODONE HYDROCHLORIDE 5 MG/1
TABLET ORAL
Status: DISPENSED
Start: 2020-06-04

## (undated) RX ORDER — HYDROMORPHONE HYDROCHLORIDE 1 MG/ML
INJECTION, SOLUTION INTRAMUSCULAR; INTRAVENOUS; SUBCUTANEOUS
Status: DISPENSED
Start: 2020-03-09

## (undated) RX ORDER — PROPOFOL 10 MG/ML
INJECTION, EMULSION INTRAVENOUS
Status: DISPENSED
Start: 2020-03-09

## (undated) RX ORDER — ACETAMINOPHEN 325 MG/1
TABLET ORAL
Status: DISPENSED
Start: 2020-06-04

## (undated) RX ORDER — LIDOCAINE HYDROCHLORIDE 20 MG/ML
INJECTION, SOLUTION EPIDURAL; INFILTRATION; INTRACAUDAL; PERINEURAL
Status: DISPENSED
Start: 2020-03-09

## (undated) RX ORDER — OXYCODONE HYDROCHLORIDE 5 MG/1
TABLET ORAL
Status: DISPENSED
Start: 2020-06-26

## (undated) RX ORDER — TRIAMCINOLONE ACETONIDE 40 MG/ML
INJECTION, SUSPENSION INTRA-ARTICULAR; INTRAMUSCULAR
Status: DISPENSED
Start: 2020-06-26

## (undated) RX ORDER — CEFAZOLIN SODIUM 2 G/100ML
INJECTION, SOLUTION INTRAVENOUS
Status: DISPENSED
Start: 2020-06-04

## (undated) RX ORDER — LIDOCAINE HYDROCHLORIDE 20 MG/ML
INJECTION, SOLUTION EPIDURAL; INFILTRATION; INTRACAUDAL; PERINEURAL
Status: DISPENSED
Start: 2020-06-04

## (undated) RX ORDER — DEXAMETHASONE SODIUM PHOSPHATE 10 MG/ML
INJECTION, SOLUTION INTRAMUSCULAR; INTRAVENOUS
Status: DISPENSED
Start: 2020-05-26

## (undated) RX ORDER — PROPOFOL 10 MG/ML
INJECTION, EMULSION INTRAVENOUS
Status: DISPENSED
Start: 2020-06-04

## (undated) RX ORDER — VANCOMYCIN HYDROCHLORIDE 1 G/20ML
INJECTION, POWDER, LYOPHILIZED, FOR SOLUTION INTRAVENOUS
Status: DISPENSED
Start: 2020-03-09

## (undated) RX ORDER — DEXAMETHASONE SODIUM PHOSPHATE 4 MG/ML
INJECTION, SOLUTION INTRA-ARTICULAR; INTRALESIONAL; INTRAMUSCULAR; INTRAVENOUS; SOFT TISSUE
Status: DISPENSED
Start: 2020-06-26

## (undated) RX ORDER — ACETAMINOPHEN 325 MG/1
TABLET ORAL
Status: DISPENSED
Start: 2020-06-26

## (undated) RX ORDER — VANCOMYCIN HYDROCHLORIDE 1 G/20ML
INJECTION, POWDER, LYOPHILIZED, FOR SOLUTION INTRAVENOUS
Status: DISPENSED
Start: 2020-06-26

## (undated) RX ORDER — OXYCODONE HYDROCHLORIDE 5 MG/1
TABLET ORAL
Status: DISPENSED
Start: 2020-03-09

## (undated) RX ORDER — ONDANSETRON 2 MG/ML
INJECTION INTRAMUSCULAR; INTRAVENOUS
Status: DISPENSED
Start: 2020-06-26

## (undated) RX ORDER — DIAZEPAM 10 MG/2ML
INJECTION, SOLUTION INTRAMUSCULAR; INTRAVENOUS
Status: DISPENSED
Start: 2020-06-26

## (undated) RX ORDER — LIDOCAINE HYDROCHLORIDE 10 MG/ML
INJECTION, SOLUTION EPIDURAL; INFILTRATION; INTRACAUDAL; PERINEURAL
Status: DISPENSED
Start: 2020-05-26

## (undated) RX ORDER — METHOCARBAMOL 750 MG/1
TABLET, FILM COATED ORAL
Status: DISPENSED
Start: 2020-06-04

## (undated) RX ORDER — LIDOCAINE HYDROCHLORIDE 20 MG/ML
INJECTION, SOLUTION EPIDURAL; INFILTRATION; INTRACAUDAL; PERINEURAL
Status: DISPENSED
Start: 2020-06-26

## (undated) RX ORDER — BUPIVACAINE HYDROCHLORIDE AND EPINEPHRINE 5; 5 MG/ML; UG/ML
INJECTION, SOLUTION PERINEURAL
Status: DISPENSED
Start: 2020-03-09

## (undated) RX ORDER — GABAPENTIN 300 MG/1
CAPSULE ORAL
Status: DISPENSED
Start: 2020-03-09